# Patient Record
Sex: MALE | Race: WHITE | Employment: OTHER | URBAN - METROPOLITAN AREA
[De-identification: names, ages, dates, MRNs, and addresses within clinical notes are randomized per-mention and may not be internally consistent; named-entity substitution may affect disease eponyms.]

---

## 2017-04-19 RX ORDER — BLOOD SUGAR DIAGNOSTIC
STRIP MISCELLANEOUS
Qty: 100 STRIP | Refills: 0 | Status: SHIPPED | OUTPATIENT
Start: 2017-04-19 | End: 2017-04-19 | Stop reason: SDUPTHER

## 2017-04-19 NOTE — TELEPHONE ENCOUNTER
Patient is calling, patient states that he would like to request that the physician \"hurry up\" and send the medication to the pharmacy on file as he is out.

## 2017-04-19 NOTE — TELEPHONE ENCOUNTER
Contact # is [phone]    Patient will be out of medication in a few days.  Patient is requesting a refill on medication:  Requested Prescriptions     Pending Prescriptions Disp Refills    insulin lispro (HUMALOG) 100 unit/mL injection 10 mL 4    glucose blood VI test strips (ONETOUCH ULTRA TEST) strip 100 Strip 0     Patient is scheduled on May 9th

## 2017-04-20 RX ORDER — INSULIN LISPRO 100 [IU]/ML
INJECTION, SOLUTION INTRAVENOUS; SUBCUTANEOUS
Qty: 10 ML | Refills: 4
Start: 2017-04-20 | End: 2017-06-28 | Stop reason: ALTCHOICE

## 2017-05-09 ENCOUNTER — OFFICE VISIT (OUTPATIENT)
Dept: FAMILY MEDICINE CLINIC | Age: 77
End: 2017-05-09

## 2017-05-09 VITALS
HEART RATE: 71 BPM | SYSTOLIC BLOOD PRESSURE: 116 MMHG | DIASTOLIC BLOOD PRESSURE: 69 MMHG | TEMPERATURE: 97.6 F | RESPIRATION RATE: 18 BRPM | BODY MASS INDEX: 26.88 KG/M2 | WEIGHT: 192 LBS | OXYGEN SATURATION: 96 % | HEIGHT: 71 IN

## 2017-05-09 DIAGNOSIS — D69.6 THROMBOCYTOPENIA (HCC): ICD-10-CM

## 2017-05-09 DIAGNOSIS — N40.1 BENIGN NON-NODULAR PROSTATIC HYPERPLASIA WITH LOWER URINARY TRACT SYMPTOMS: ICD-10-CM

## 2017-05-09 DIAGNOSIS — J43.9 PULMONARY EMPHYSEMA, UNSPECIFIED EMPHYSEMA TYPE (HCC): ICD-10-CM

## 2017-05-09 NOTE — PATIENT INSTRUCTIONS
Learning About ACE Inhibitors and ARBs for Diabetes  Introduction  ACE inhibitors and ARBs are medicines used to control blood pressure. They allow blood vessels to relax and open up. This lowers your blood pressure. When you have diabetes, taking an ACE inhibitor or ARB can help to:  · Treat high blood pressure. Your risk of problems from diabetes goes up when you have high blood pressure. · Prevent or slow kidney damage. Diabetes can damage the blood vessels in the kidneys. High blood pressure can damage the kidneys, too. · Lower the risks of stroke and heart attack. Your risks go up when you have high blood pressure, heart disease, or both. An ACE inhibitor or ARB is a good choice for people with diabetes. Unlike some medicines, these don't affect blood sugar levels. Examples  ACE inhibitors include:  · Benazepril. · Lisinopril. · Ramipril. ARBs include:  · Irbesartan. · Losartan. · Telmisartan. Possible side effects  All medicines can cause side effects. Some side effects of ACE inhibitors include:  · Low blood pressure. You may feel dizzy and weak. · A cough. · High potassium levels. · An allergic reaction of the skin. Symptoms may range from mild swelling to painful welts. Some side effects of ARBs include:  · Diarrhea. · High potassium levels. · Sinus problems. · Stomach problems. You may have other side effects or reactions not listed here. Check the information that comes with your medicine. What to know about taking this medicine  · Be safe with medicines. Take your medicines exactly as prescribed. Call your doctor if you think you are having a problem with your medicine. · Before starting an ACE inhibitor or ARB, tell your doctor if you:  ¨ Use a salt substitute. ¨ Take diuretics or potassium tablets. · These medicines are not safe for pregnancy. If you are pregnant or planning to be, talk to your doctor about a safe blood pressure medicine.   · ACE inhibitors can cause a dry cough. If the cough is bad, talk to your doctor. Switching to an ARB is likely to help. · Taking some medicines together can cause problems. Tell your doctor or pharmacist all the medicines you take. This includes over-the-counter medicines, vitamins, herbal products, and supplements. · You may need regular blood and urine tests. Where can you learn more? Go to http://meg-zamzam.info/. Enter M316 in the search box to learn more about \"Learning About ACE Inhibitors and ARBs for Diabetes. \"  Current as of: June 13, 2016  Content Version: 11.2  © 5342-9530 [employer]. Care instructions adapted under license by [employer] (which disclaims liability or warranty for this information). If you have questions about a medical condition or this instruction, always ask your healthcare professional. [address] any warranty or liability for your use of this information.

## 2017-05-09 NOTE — MR AVS SNAPSHOT
Visit Information Date & Time Provider Department Dept. Phone Encounter #  
 5/9/2017  9:20 AM Gilda Martinez  [facility] [phone] [id number] Follow-up Instructions Return in about 4 weeks (around 6/6/2017) for follow up, , DM. Your Appointments 8/8/2017 10:00 AM  
ECHO CARDIOGRAMS 2D with ECHO, [facility] (APPLE SCHEDULING) Appt Note: echo at 10 am dx AS 1 yr fu appt dr Kailee Chow [address]  
[address] [address]  
  
    
 8/8/2017 10:40 AM  
ESTABLISHED PATIENT with Daniel Plunkett MD  
[facility] (APPLE SCHEDULING) Appt Note: echo at 10 am dx AS 1 yr fu appt dr Kailee Chow [address]  
[address] [facility],[address] [facility] [address] Upcoming Health Maintenance Date Due  
 FOOT EXAM Q1 7/10/1950 EYE EXAM RETINAL OR DILATED Q1 7/10/1950 DTaP/Tdap/Td series (1 - Tdap) 7/10/1962 ZOSTER VACCINE AGE 60> 7/10/2001 GLAUCOMA SCREENING Q2Y 7/10/2006 MEDICARE YEARLY EXAM 7/10/2006 HEMOGLOBIN A1C Q6M 6/13/2017 INFLUENZA AGE 9 TO ADULT 8/1/2017 Pneumococcal 65+ Low/Medium Risk (2 of 2 - PPSV23) 9/16/2017 MICROALBUMIN Q1 12/13/2017 LIPID PANEL Q1 12/13/2017 Allergies as of 5/9/2017  Review Complete On: 5/9/2017 By: Gilda Martinez MD  
 No Known Allergies Current Immunizations  Reviewed on 8/2/2016 Name Date Influenza Vaccine 7/28/2016 Not reviewed this visit You Were Diagnosed With   
  
 Codes Comments Uncontrolled type 2 diabetes mellitus without complication, with long-term current use of insulin ([facility]    -  Primary ICD-10-CM: E11.65, Z79.4 ICD-9-CM: 250.02, V58.67 Pulmonary emphysema, unspecified emphysema type ([facility] [facility]     ICD-10-CM: J43.9 ICD-9-CM: 492.8 Thrombocytopenia ([facility] [facility]     ICD-10-CM: D69.6 ICD-9-CM: 287.5 Benign non-nodular prostatic hyperplasia with lower urinary tract symptoms     ICD-10-CM: N40.1 ICD-9-CM: 600.91 Vitals BP Pulse Temp Resp Height(growth percentile) Weight(growth percentile) 116/69 (BP 1 Location: Right arm, BP Patient Position: Sitting) 71 97.6 °F (36.4 °C) (Oral) 18 5' 11\" (1.803 m) 192 lb (87.1 kg) SpO2 BMI Smoking Status 96% 26.78 kg/m2 Current Every Day Smoker Vitals History BMI and BSA Data Body Mass Index Body Surface Area  
 26.78 kg/m 2 2.09 m 2 Preferred Pharmacy Pharmacy Name Phone [address] [phone] Your Updated Medication List  
  
   
This list is accurate as of: 5/9/17 10:24 AM.  Always use your most recent med list.  
  
  
  
  
 CENTRUM SILVER ULTRA MEN'S PO Take  by mouth. furosemide 40 mg tablet Commonly known as:  LASIX TAKE 1 TABLET BY MOUTH DAILY  
  
 glucose blood VI test strips strip Commonly known as:  ONETOUCH ULTRA TEST  
TEST TWICE DAILY  
  
 insulin lispro 100 unit/mL injection Commonly known as:  HumaLOG INJECT UNDER THE SKIN BASED ON SLIDING SCALE AS DIRECTED MAXIUMUM OF 18 UNITS  
  
 insulin NPH/insulin regular 100 unit/mL (70-30) injection Commonly known as:  NOVOLIN 70/30, HUMULIN 70/30  
10 Units by SubCUTAneous route Before breakfast and dinner. Indications: type 2 diabetes mellitus Insulin Syringe-Needle U-100 1/2 mL 30 gauge Syrg USE FOR INSULIN INJECTION TWICE DAILY  
  
 ONETOUCH ULTRA2 monitoring kit Generic drug:  Blood-Glucose Meter  
  
 potassium chloride 20 mEq tablet Commonly known as:  K-DUR, KLOR-CON  
TAKE 1 TABLET BY MOUTH EVERY DAY  
  
 PROAIR HFA 90 mcg/actuation inhaler Generic drug:  albuterol  
  
 tamsulosin 0.4 mg capsule Commonly known as:  FLOMAX Take 0.4 mg by mouth daily. VESIcare 10 mg tablet Generic drug:  solifenacin TK 1 T PO QD Prescriptions Sent to Pharmacy Refills  
 insulin NPH/insulin regular (NOVOLIN 70/30, HUMULIN 70/30) 100 unit/mL (70-30) injection 1 Sig: 10 Units by SubCUTAneous route Before breakfast and dinner. Indications: type 2 diabetes mellitus Class: Normal  
 [facility] [address] [facility] Ph #: [phone] Route: SubCUTAneous We Performed the Following CBC WITH AUTOMATED DIFF [33826 CPT(R)] HEMOGLOBIN A1C WITH EAG [41745 CPT(R)]  DIABETES FOOT EXAM [HM7 Custom] LIPID PANEL [94866 CPT(R)] METABOLIC PANEL, COMPREHENSIVE [82100 CPT(R)] Follow-up Instructions Return in about 4 weeks (around 6/6/2017) for follow up, , DM. Patient Instructions Learning About ACE Inhibitors and ARBs for Diabetes Introduction ACE inhibitors and ARBs are medicines used to control blood pressure. They allow blood vessels to relax and open up. This lowers your blood pressure. When you have diabetes, taking an ACE inhibitor or ARB can help to: · Treat high blood pressure. Your risk of problems from diabetes goes up when you have high blood pressure. · Prevent or slow kidney damage. Diabetes can damage the blood vessels in the kidneys. High blood pressure can damage the kidneys, too. · Lower the risks of stroke and heart attack. Your risks go up when you have high blood pressure, heart disease, or both. An ACE inhibitor or ARB is a good choice for people with diabetes. Unlike some medicines, these don't affect blood sugar levels. Examples ACE inhibitors include: · Benazepril. · Lisinopril. · Ramipril. ARBs include: · Irbesartan. · Losartan. · Telmisartan. Possible side effects All medicines can cause side effects. Some side effects of ACE inhibitors include: · Low blood pressure. You may feel dizzy and weak. · A cough. · High potassium levels. · An allergic reaction of the skin. Symptoms may range from mild swelling to painful welts. Some side effects of ARBs include: · Diarrhea. · High potassium levels. · Sinus problems. · Stomach problems. You may have other side effects or reactions not listed here. Check the information that comes with your medicine. What to know about taking this medicine · Be safe with medicines. Take your medicines exactly as prescribed. Call your doctor if you think you are having a problem with your medicine. · Before starting an ACE inhibitor or ARB, tell your doctor if you: ¨ Use a salt substitute. ¨ Take diuretics or potassium tablets. · These medicines are not safe for pregnancy. If you are pregnant or planning to be, talk to your doctor about a safe blood pressure medicine. · ACE inhibitors can cause a dry cough. If the cough is bad, talk to your doctor. Switching to an ARB is likely to help. · Taking some medicines together can cause problems. Tell your doctor or pharmacist all the medicines you take. This includes over-the-counter medicines, vitamins, herbal products, and supplements. · You may need regular blood and urine tests. Where can you learn more? Go to http://meg-zamzam.info/. Enter M316 in the search box to learn more about \"Learning About ACE Inhibitors and ARBs for Diabetes. \" Current as of: June 13, 2016 Content Version: 11.2 © 8303-3602 [employer]. Care instructions adapted under license by [employer] (which disclaims liability or warranty for this information). If you have questions about a medical condition or this instruction, always ask your healthcare professional. [place] [address] any warranty or liability for your use of this information. Introducing [facility]! Cindy Hemp introduces Owned it patient portal. Now you can access parts of your medical record, email your doctor's office, and request medication refills online. 1. In your internet browser, go to [email or web address]. [email or web address]/[employer] 2. Click on the First Time User? Click Here link in the Sign In box. You will see the New Member Sign Up page. 3. Enter your Owned it Access Code exactly as it appears below. You will not need to use this code after youve completed the sign-up process. If you do not sign up before the expiration date, you must request a new code. · Owned it Access Code: [email or web address] Expires: 8/7/2017 10:24 AM 
 
4. Enter the last four digits of your Social Security Number (xxxx) and Date of Birth (mm/dd/yyyy) as indicated and click Submit. You will be taken to the next sign-up page. 5. Create a Owned it ID. This will be your Owned it login ID and cannot be changed, so think of one that is secure and easy to remember. 6. Create a Owned it password. You can change your password at any time. 7. Enter your Password Reset Question and Answer. This can be used at a later time if you forget your password. 8. Enter your e-mail address. You will receive e-mail notification when new information is available in [address] 9. Click Sign Up. You can now view and download portions of your medical record. 10. Click the Download Summary menu link to download a portable copy of your medical information. If you have questions, please visit the Frequently Asked Questions section of the Owned it website. Remember, Owned it is NOT to be used for urgent needs. For medical emergencies, dial 911. Now available from your iPhone and Android! Please provide this summary of care documentation to your next provider. Your primary care clinician is listed as Chacha Corral. If you have any questions after today's visit, please call [phone].

## 2017-05-09 NOTE — ACP (ADVANCE CARE PLANNING)
Patient states \" I have paper work notarized for no resuscitation \"  Patients son Joselito Deluna has paper work, he does not have copy. Requested that patient provide our office with a copy of paperwork.

## 2017-05-09 NOTE — PROGRESS NOTES
HISTORY OF PRESENT ILLNESS  Janna Landrum is a 77 y.o. male. He is a new patient and is here to establish care. Previous followed by Dr Jake Amado. The following sections were reviewed & updated as appropriate: PMH, PL, PSH, and SH. He was seen to discuss diabetes and high sugar readings and medicines  He has h/o dm, emphysema, aortic stenosis, idiopathic thmobocytopenia  HPI  Endocrine Review  He is seen for diabetes. Since last visit he reports: no polyuria or polydipsia, no chest pain, dyspnea or TIA's, no numbness, tingling or pain in extremities, no unusual visual symptoms, no hypoglycemia, no medication side effects noted. Home glucose monitoring: is performed regularly, fasting values range 180s, 2 hour post prandial values range 150-160  He reports medication compliance: compliant all of the time. Medication side effects: none. Diabetic diet compliance: compliant all of the time. Lab review: labs reviewed and discussed with patient. Eye exam: UTD. He is on Insulin Lispro as sliding scale , with meals. Lab Results   Component Value Date/Time    Hemoglobin A1c 5.4 12/13/2016 11:22 AM    Hemoglobin A1c (POC) 6.6 08/02/2016 12:31 PM        COPD  Patient complains of cough, fatigue and shortness of breath. Symptoms began several years ago. Symptoms chronic dyspnea: severity = mild: course of sx: well controlled  becomes dyspneic after 5 blocks  symptoms worse with exertion. does worsen with exertion. Sputum is clear in small amounts. Patient currently is not on home oxygen therapy. Lorenso Frankel Respiratory history: pneumonia, COPD and history of 10  pack years tobacco use  Follows Dr Isaac Pike. PFT not c/w obstructive disease. He was advised to continue on Dulera and prn Albuterol. in spite of counseling, he is still smoking  He stopped all inhalers      He has thrombocytopenia for years and follows Dr Ivania Brady. He is s/p extensive work up including bone marrow Bx and he is diagnosed with idiopathic TP. LDH, reticulocyte count, all normal.  Review of Systems   Constitutional: Negative for chills, fever and malaise/fatigue. HENT: Negative for congestion, ear pain, sore throat and tinnitus. Eyes: Negative for blurred vision, double vision, pain and discharge. Respiratory: Positive for shortness of breath. Negative for cough and wheezing. Cardiovascular: Negative for chest pain, palpitations and leg swelling. Gastrointestinal: Negative for abdominal pain, blood in stool, constipation, diarrhea, nausea and vomiting. Genitourinary: Negative for dysuria, frequency, hematuria and urgency. Musculoskeletal: Negative for back pain, joint pain and myalgias. Skin: Negative for rash. Neurological: Negative for dizziness, tremors, seizures and headaches. Endo/Heme/Allergies: Negative for polydipsia. Does not bruise/bleed easily. Psychiatric/Behavioral: Negative for depression and substance abuse. The patient is not nervous/anxious. Physical Exam   Constitutional: He is oriented to person, place, and time. He appears well-developed and well-nourished. HENT:   Head: Normocephalic and atraumatic. Right Ear: External ear normal.   Mouth/Throat: Oropharynx is clear and moist. No oropharyngeal exudate. Eyes: Conjunctivae and EOM are normal. Pupils are equal, round, and reactive to light. No scleral icterus. Neck: Normal range of motion. Neck supple. No JVD present. No thyromegaly present. Cardiovascular: Normal rate, regular rhythm, normal heart sounds and intact distal pulses. No murmur heard. Pulmonary/Chest: Effort normal and breath sounds normal. He has no wheezes. Abdominal: Soft. Bowel sounds are normal. He exhibits no distension and no mass. Musculoskeletal: Normal range of motion. He exhibits no edema or tenderness.    Feet:warm, good capillary refill, no trophic changes or ulcerative lesions, normal DP and PT pulses, normal monofilament exam and normal sensory exam Lymphadenopathy:     He has no cervical adenopathy. Neurological: He is alert and oriented to person, place, and time. He has normal reflexes. No cranial nerve deficit. Skin: Skin is warm and dry. No rash noted. He is not diaphoretic. Psychiatric: He has a normal mood and affect. Nursing note and vitals reviewed. ASSESSMENT and PLAN  Angela Denson was seen today for diabetes and hip pain. Diagnoses and all orders for this visit:    Uncontrolled type 2 diabetes mellitus without complication, with long-term current use of insulin ([facility])  -     insulin NPH/insulin regular (NOVOLIN 70/30, HUMULIN 70/30) 100 unit/mL (70-30) injection; 10 Units by SubCUTAneous route Before breakfast and dinner. Indications: type 2 diabetes mellitus  -     HM DIABETES FOOT EXAM  -     METABOLIC PANEL, COMPREHENSIVE  -     LIPID PANEL  -     HEMOGLOBIN A1C WITH EAG    Pulmonary emphysema, unspecified emphysema type ([facility])  -     METABOLIC PANEL, COMPREHENSIVE    Thrombocytopenia ([facility])  -     CBC WITH AUTOMATED DIFF    Benign non-nodular prostatic hyperplasia with lower urinary tract symptoms    changed Insulin Lispro to 70/30 for better sugar coverage. Discussed lifestyle issues and health guidance given  Patient was given an after visit summary which includes diagnoses, vital signs, current medications, instructions and references & authorized prescriptions . Results of labs will be conveyed to patient, once available. Pt verbalized instructions I provided and expressed understanding of discussion that was held today. Follow-up Disposition:  Return in about 4 weeks (around 6/6/2017) for follow up, , DM.

## 2017-05-09 NOTE — PROGRESS NOTES
Chief Complaint   Patient presents with    Diabetes     Follow up, fasting. Patient concerned about getting his diabetes under control . Patient states \" I add 4 units to the required regimen. \"      Hip Pain     right side      1. Have you been to the ER, urgent care clinic since your last visit? Hospitalized since your last visit? No    2. Have you seen or consulted any other health care providers outside of the [address] [place] [address] since your last visit? Include any pap smears or colon screening. No    Patient states \" I have paper work notarized for no resuscitation \"  Patients son Nate Baptiste has paper work, he does not have copy. Requested that patient provide our office with a copy of paperwork.

## 2017-05-10 LAB
ALBUMIN SERPL-MCNC: 4.6 G/DL (ref 3.5–4.8)
ALBUMIN/GLOB SERPL: 1.8 {RATIO} (ref 1.2–2.2)
ALP SERPL-CCNC: 74 IU/L (ref 39–117)
ALT SERPL-CCNC: 11 IU/L (ref 0–44)
AST SERPL-CCNC: 20 IU/L (ref 0–40)
BASOPHILS # BLD AUTO: 0 X10E3/UL (ref 0–0.2)
BASOPHILS NFR BLD AUTO: 1 %
BILIRUB SERPL-MCNC: 0.7 MG/DL (ref 0–1.2)
BUN SERPL-MCNC: 18 MG/DL (ref 8–27)
BUN/CREAT SERPL: 20 (ref 10–24)
CALCIUM SERPL-MCNC: 9.7 MG/DL (ref 8.6–10.2)
CHLORIDE SERPL-SCNC: 101 MMOL/L (ref 96–106)
CHOLEST SERPL-MCNC: 147 MG/DL (ref 100–199)
CO2 SERPL-SCNC: 23 MMOL/L (ref 18–29)
CREAT SERPL-MCNC: 0.89 MG/DL (ref 0.76–1.27)
EOSINOPHIL # BLD AUTO: 0.3 X10E3/UL (ref 0–0.4)
EOSINOPHIL NFR BLD AUTO: 5 %
ERYTHROCYTE [DISTWIDTH] IN BLOOD BY AUTOMATED COUNT: 16.2 % (ref 12.3–15.4)
EST. AVERAGE GLUCOSE BLD GHB EST-MCNC: 117 MG/DL
GLOBULIN SER CALC-MCNC: 2.5 G/DL (ref 1.5–4.5)
GLUCOSE SERPL-MCNC: 114 MG/DL (ref 65–99)
HBA1C MFR BLD: 5.7 % (ref 4.8–5.6)
HCT VFR BLD AUTO: 40.5 % (ref 37.5–51)
HDLC SERPL-MCNC: 42 MG/DL
HGB BLD-MCNC: 13.9 G/DL (ref 12.6–17.7)
IMM GRANULOCYTES # BLD: 0.1 X10E3/UL (ref 0–0.1)
IMM GRANULOCYTES NFR BLD: 1 %
INTERPRETATION, 910389: NORMAL
LDLC SERPL CALC-MCNC: 89 MG/DL (ref 0–99)
LYMPHOCYTES # BLD AUTO: 1.3 X10E3/UL (ref 0.7–3.1)
LYMPHOCYTES NFR BLD AUTO: 24 %
MCH RBC QN AUTO: 31.2 PG (ref 26.6–33)
MCHC RBC AUTO-ENTMCNC: 34.3 G/DL (ref 31.5–35.7)
MCV RBC AUTO: 91 FL (ref 79–97)
MONOCYTES # BLD AUTO: 0.5 X10E3/UL (ref 0.1–0.9)
MONOCYTES NFR BLD AUTO: 9 %
MORPHOLOGY BLD-IMP: ABNORMAL
NEUTROPHILS # BLD AUTO: 3.4 X10E3/UL (ref 1.4–7)
NEUTROPHILS NFR BLD AUTO: 60 %
PLATELET # BLD AUTO: 56 X10E3/UL (ref 150–379)
POTASSIUM SERPL-SCNC: 5.1 MMOL/L (ref 3.5–5.2)
PROT SERPL-MCNC: 7.1 G/DL (ref 6–8.5)
RBC # BLD AUTO: 4.45 X10E6/UL (ref 4.14–5.8)
SODIUM SERPL-SCNC: 140 MMOL/L (ref 134–144)
TRIGL SERPL-MCNC: 78 MG/DL (ref 0–149)
VLDLC SERPL CALC-MCNC: 16 MG/DL (ref 5–40)
WBC # BLD AUTO: 5.6 X10E3/UL (ref 3.4–10.8)

## 2017-05-10 NOTE — PROGRESS NOTES
Please inform patient  CBC shows low platelets, is following hematology, to keep appointment and follow up with Dr Charles Cruz  Excellent cholesterol results  Kidney and liver function normal, average blood sugar 5.7, very normal for his age.  To continue on new regimen I discussed yesterday

## 2017-05-17 DIAGNOSIS — R60.9 EDEMA, UNSPECIFIED TYPE: ICD-10-CM

## 2017-05-17 DIAGNOSIS — I35.0 NONRHEUMATIC AORTIC VALVE STENOSIS: ICD-10-CM

## 2017-05-17 RX ORDER — FUROSEMIDE 40 MG/1
TABLET ORAL
Qty: 30 TAB | Refills: 0 | Status: SHIPPED | COMMUNITY
Start: 2017-05-17 | End: 2017-06-15 | Stop reason: SDUPTHER

## 2017-05-22 RX ORDER — POTASSIUM CHLORIDE 20 MEQ/1
TABLET, EXTENDED RELEASE ORAL
Qty: 90 TAB | Refills: 0 | Status: SHIPPED | OUTPATIENT
Start: 2017-05-22 | End: 2017-08-28 | Stop reason: SDUPTHER

## 2017-05-22 NOTE — TELEPHONE ENCOUNTER
Received faxed refill request for this medication from the pharmacy that is on file.     potassium chloride (K-DUR, KLOR-CON) 20 mEq tablet [Pharmacy Med Name: POTASSIUM CL 20MEQ ER TABLETS]  TAKE 1 TABLET BY MOUTH EVERY DAY, Normal, Disp-30 Tab, R-0

## 2017-06-15 DIAGNOSIS — I35.0 NONRHEUMATIC AORTIC VALVE STENOSIS: ICD-10-CM

## 2017-06-15 DIAGNOSIS — R60.9 EDEMA, UNSPECIFIED TYPE: ICD-10-CM

## 2017-06-15 RX ORDER — FUROSEMIDE 40 MG/1
TABLET ORAL
Qty: 30 TAB | Refills: 0 | Status: SHIPPED | COMMUNITY
Start: 2017-06-15 | End: 2017-07-23 | Stop reason: SDUPTHER

## 2017-06-28 ENCOUNTER — OFFICE VISIT (OUTPATIENT)
Dept: FAMILY MEDICINE CLINIC | Age: 77
End: 2017-06-28

## 2017-06-28 VITALS
OXYGEN SATURATION: 95 % | WEIGHT: 194 LBS | TEMPERATURE: 97.7 F | RESPIRATION RATE: 16 BRPM | HEIGHT: 71 IN | HEART RATE: 56 BPM | BODY MASS INDEX: 27.16 KG/M2 | SYSTOLIC BLOOD PRESSURE: 110 MMHG | DIASTOLIC BLOOD PRESSURE: 67 MMHG

## 2017-06-28 DIAGNOSIS — E11.43 CONTROLLED TYPE 2 DIABETES MELLITUS WITH DIABETIC AUTONOMIC NEUROPATHY, WITH LONG-TERM CURRENT USE OF INSULIN (HCC): Primary | ICD-10-CM

## 2017-06-28 DIAGNOSIS — M16.11 ARTHRITIS OF RIGHT HIP: ICD-10-CM

## 2017-06-28 DIAGNOSIS — Z79.4 CONTROLLED TYPE 2 DIABETES MELLITUS WITH DIABETIC AUTONOMIC NEUROPATHY, WITH LONG-TERM CURRENT USE OF INSULIN (HCC): Primary | ICD-10-CM

## 2017-06-28 LAB — HBA1C MFR BLD HPLC: 5.4 %

## 2017-06-28 RX ORDER — MELOXICAM 15 MG/1
15 TABLET ORAL
Qty: 30 TAB | Refills: 2 | Status: SHIPPED | OUTPATIENT
Start: 2017-06-28 | End: 2017-09-26 | Stop reason: SDUPTHER

## 2017-06-28 RX ORDER — GABAPENTIN 100 MG/1
100 CAPSULE ORAL
Qty: 90 CAP | Refills: 1 | Status: SHIPPED | OUTPATIENT
Start: 2017-06-28 | End: 2017-12-26 | Stop reason: SDUPTHER

## 2017-06-28 NOTE — MR AVS SNAPSHOT
Visit Information Date & Time Provider Department Dept. Phone Encounter #  
 6/28/2017 10:40 AM Sanjuana Brown MD [address] [place] [address] [phone] [id number] Follow-up Instructions Return in about 3 months (around 9/28/2017) for fasting, follow up, DM, CHO. Upcoming Health Maintenance Date Due  
 EYE EXAM RETINAL OR DILATED Q1 7[birth year]10/1950 DTaP/Tdap/Td series (1 - Tdap) 7/10/1962 ZOSTER VACCINE AGE 60> 7/10/2001 GLAUCOMA SCREENING Q2Y 7/10/2006 MEDICARE YEARLY EXAM 7/10/2006 INFLUENZA AGE 9 TO ADULT 8/1/2017 Pneumococcal 65+ Low/Medium Risk (2 of 2 - PPSV23) 9/16/2017 HEMOGLOBIN A1C Q6M 11/9/2017 MICROALBUMIN Q1 12/13/2017 FOOT EXAM Q1 5/9/2018 LIPID PANEL Q1 5/9/2018 Allergies as of 6/28/2017  Review Complete On: 6/28/2017 By: Sanjuana Brown MD  
 No Known Allergies Current Immunizations  Reviewed on 5/9/2017 Name Date Influenza High Dose Vaccine PF 9/16/2016, 11/7/2015 Influenza Vaccine 7/28/2016, 1/1/2016, 1/1/2015, 10/14/2013 Pneumococcal Conjugate (PCV-13) 9/16/2016, 6/10/2016, 1/1/2016 Not reviewed this visit You Were Diagnosed With   
  
 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 
ICD-9-CM: 716.95 Vitals BP Pulse Temp Resp Height(growth percentile) Weight(growth percentile) 110/67 (BP 1 Location: Right arm, BP Patient Position: Sitting) (!) 56 97.7 °F (36.5 °C) (Oral) 16 5' 11\" (1.803 m) 194 lb (88 kg) SpO2 BMI Smoking Status 95% 27.06 kg/m2 Current Every Day Smoker Vitals History BMI and BSA Data Body Mass Index Body Surface Area  
 27.06 kg/m 2 2.1 m 2 Preferred Pharmacy Pharmacy Name Phone [address]  [phone] Your Updated Medication List  
  
   
This list is accurate as of: 6/28/17 11:35 AM.  Always use your most recent med list.  
  
  
  
  
 CENTRUM SILVER ULTRA MEN'S PO Take  by mouth. furosemide 40 mg tablet Commonly known as:  LASIX TAKE 1 TABLET BY MOUTH DAILY  
  
 gabapentin 100 mg capsule Commonly known as:  NEURONTIN Take 1 Cap by mouth nightly. glucose blood VI test strips strip Commonly known as:  ONETOUCH ULTRA TEST  
TEST TWICE DAILY  
  
 insulin NPH/insulin regular 100 unit/mL (70-30) injection Commonly known as:  NOVOLIN 70/30, HUMULIN 70/30  
10 Units by SubCUTAneous route Before breakfast and dinner. Indications: type 2 diabetes mellitus Insulin Syringe-Needle U-100 1/2 mL 30 gauge Syrg USE FOR INSULIN INJECTION TWICE DAILY  
  
 meloxicam 15 mg tablet Commonly known as:  MOBIC Take 1 Tab by mouth daily (after breakfast). ONETOUCH ULTRA2 monitoring kit Generic drug:  Blood-Glucose Meter  
  
 potassium chloride 20 mEq tablet Commonly known as:  K-DUR, KLOR-CON  
TAKE 1 TABLET BY MOUTH EVERY DAY  
  
 PROAIR HFA 90 mcg/actuation inhaler Generic drug:  albuterol  
  
 tamsulosin 0.4 mg capsule Commonly known as:  FLOMAX Take 0.4 mg by mouth daily. VESIcare 10 mg tablet Generic drug:  solifenacin TK 1 T PO QD Prescriptions Sent to Pharmacy Refills  
 gabapentin (NEURONTIN) 100 mg capsule 1 Sig: Take 1 Cap by mouth nightly. Class: Normal  
 [facility] #: [phone] Route: Oral  
 meloxicam (MOBIC) 15 mg tablet 2 Sig: Take 1 Tab by mouth daily (after breakfast).   
 Class: Normal  
 Pharmacy: [facility] [address] [place] [address] #: [phone] Route: Oral  
  
We Performed the Following AMB POC HEMOGLOBIN A1C [87164 CPT(R)] REFERRAL TO OPHTHALMOLOGY [REF57 Custom] Comments:  
 Please evaluate patient for diabetic retinopathy. Follow-up Instructions Return in about 3 months (around 9/28/2017) for fasting, follow up, DM, CHO. Referral Information Referral ID Referred By Referred To  
  
 [id number] Sander Doom [facility] [address] [place], [address] Visits Status Start Date End Date 1 New Request 6/28/17 6/28/18 If your referral has a status of pending review or denied, additional information will be sent to support the outcome of this decision. Introducing [facility]! Gilma Jacobson introduces [email or web address] patient portal. Now you can access parts of your medical record, email your doctor's office, and request medication refills online. 1. In your internet browser, go to [email or web address] 2. Click on the First Time User? Click Here link in the Sign In box. You will see the New Member Sign Up page. 3. Enter your [email or web address] Access Code exactly as it appears below. You will not need to use this code after youve completed the sign-up process. If you do not sign up before the expiration date, you must request a new code. · [email or web address] Access Code: [email or web address] Expires: 8/7/2017 10:24 AM 
 
4. Enter the last four digits of your Social Security Number (xxxx) and Date of Birth (mm/dd/yyyy) as indicated and click Submit. You will be taken to the next sign-up page. 5. Create a Angstrot ID. This will be your [email or web address] login ID and cannot be changed, so think of one that is secure and easy to remember. 6. Create a [email or web address] password. You can change your password at any time. 7. Enter your Password Reset Question and Answer.  This can be used at a later time if you forget your password. 8. Enter your e-mail address. You will receive e-mail notification when new information is available in [address] 9. Click Sign Up. You can now view and download portions of your medical record. 10. Click the Download Summary menu link to download a portable copy of your medical information. If you have questions, please visit the Frequently Asked Questions section of the [employer] website. Remember, [employer] is NOT to be used for urgent needs. For medical emergencies, dial 911. Now available from your iPhone and Android! Please provide this summary of care documentation to your next provider. Your primary care clinician is listed as Andre Ogden. If you have any questions after today's visit, please call [phone].

## 2017-06-28 NOTE — PATIENT INSTRUCTIONS

## 2017-06-28 NOTE — PROGRESS NOTES
Chief Complaint   Patient presents with    Diabetes     Follow up    Hip Pain     right    Foot Swelling     No swelling, Patient states \" Feels like a have a pad on the bottom of my feet\"       1. Have you been to the ER, urgent care clinic since your last visit? Hospitalized since your last visit? No    2. Have you seen or consulted any other health care providers outside of the [address] [place] [address] since your last visit? Include any pap smears or colon screening.  No

## 2017-06-28 NOTE — PROGRESS NOTES
HISTORY OF PRESENT ILLNESS  Trever Clark is a 77 y.o. male. He was seen for 1 month follow up on diabetes and right hip pain and numbness left foot. HPI  Endocrine Review  He is seen for diabetes. Since last visit he reports: no polyuria or polydipsia, no chest pain, dyspnea or TIA's, no unusual visual symptoms, no hypoglycemia, no medication side effects noted, has noticed numbness both feet. Home glucose monitoring: is performed regularly, fasting values range 140-200  He is checking his sugars 2 per day. He reports medication compliance: compliant all of the time. Medication side effects: none. Diabetic diet compliance: compliant all of the time. Lab review: labs reviewed and discussed with patient. Eye exam: overdue. Referral done  His sliding scale Insulin was changed to NPH 12 units bid on last visit    Lab Results   Component Value Date/Time    Hemoglobin A1c 5.7 05/09/2017 10:28 AM    Glucose 114 05/09/2017 10:28 AM    Creatinine 0.89 05/09/2017 10:28 AM    Cholesterol, total 147 05/09/2017 10:28 AM    HDL Cholesterol 42 05/09/2017 10:28 AM    LDL, calculated 89 05/09/2017 10:28 AM    Triglyceride 78 05/09/2017 10:28 AM     Hip Pain  Patient complains of right hip pain. Onset of the symptoms was several months ago. Inciting event: none. Current symptoms include right sided hip pain. Severity = moderate  location: lateral, over greater trochanter, anterior  radiates to: right inguinal region, right sided  upper leg(s): anterior, lateral  worse with weight bearing. Associated symptoms: none. Aggravating symptoms: rising after sitting, any weight bearing. Patient's overall course: gradually worsening and course of pain: gradually worsening. Patient has had no prior hip problems. Previous visits for this problem: none. Evaluation to date: none. Treatment to date: none. Review of Systems   Constitutional: Negative for chills, fever and malaise/fatigue.    HENT: Negative for congestion, ear pain, sore throat and tinnitus. Eyes: Negative for blurred vision, double vision, pain and discharge. Respiratory: Negative for cough, shortness of breath and wheezing. Cardiovascular: Negative for chest pain, palpitations and leg swelling. Gastrointestinal: Negative for abdominal pain, blood in stool, constipation, diarrhea, nausea and vomiting. Genitourinary: Negative for dysuria, frequency, hematuria and urgency. Musculoskeletal: Negative for back pain, joint pain and myalgias. Hip pain right   Skin: Negative for rash. Neurological: Negative for dizziness, tremors, seizures and headaches. Endo/Heme/Allergies: Negative for polydipsia. Does not bruise/bleed easily. Psychiatric/Behavioral: Negative for depression and substance abuse. The patient is not nervous/anxious. Physical Exam   Constitutional: He is oriented to person, place, and time. He appears well-developed and well-nourished. HENT:   Head: Normocephalic and atraumatic. Right Ear: External ear normal.   Mouth/Throat: Oropharynx is clear and moist. No oropharyngeal exudate. Eyes: Conjunctivae and EOM are normal. Pupils are equal, round, and reactive to light. No scleral icterus. Neck: Normal range of motion. Neck supple. No JVD present. No thyromegaly present. Cardiovascular: Normal rate, regular rhythm, normal heart sounds and intact distal pulses. No murmur heard. Pulmonary/Chest: Effort normal and breath sounds normal. He has no wheezes. Abdominal: Soft. Bowel sounds are normal. He exhibits no distension and no mass. Musculoskeletal: Normal range of motion. He exhibits no edema or tenderness. Feet:warm, good capillary refill, no trophic changes or ulcerative lesions, normal DP and PT pulses and reduced sensation at both feet    right Hip   Tenderness: Greater trochanter, Lateral and Anterior    Positive ADAMS and FADIR     Lymphadenopathy:     He has no cervical adenopathy.    Neurological: He is alert and oriented to person, place, and time. He has normal reflexes. No cranial nerve deficit. Skin: Skin is warm and dry. No rash noted. He is not diaphoretic. Psychiatric: He has a normal mood and affect. Nursing note and vitals reviewed. ASSESSMENT and PLAN  Laxmi Shelley was seen today for diabetes, hip pain and foot swelling. Diagnoses and all orders for this visit:    Controlled type 2 diabetes mellitus with diabetic autonomic neuropathy, with long-term current use of insulin (HCC)  -     AMB POC HEMOGLOBIN A1C  -     REFERRAL TO OPHTHALMOLOGY  -     gabapentin (NEURONTIN) 100 mg capsule; Take 1 Cap by mouth nightly. Arthritis of right hip  -     meloxicam (MOBIC) 15 mg tablet; Take 1 Tab by mouth daily (after breakfast). Discussed lifestyle issues and health guidance given  Patient was given an after visit summary which includes diagnoses, vital signs, current medications, instructions and references & authorized prescriptions . Results of labs will be conveyed to patient, once available. Pt verbalized instructions I provided and expressed understanding of discussion that was held today. Follow-up Disposition:  Return in about 3 months (around 9/28/2017) for fasting, follow up, DM, CHO.

## 2017-06-29 ENCOUNTER — OFFICE VISIT (OUTPATIENT)
Dept: FAMILY MEDICINE CLINIC | Age: 77
End: 2017-06-29

## 2017-06-29 ENCOUNTER — PATIENT OUTREACH (OUTPATIENT)
Dept: FAMILY MEDICINE CLINIC | Age: 77
End: 2017-06-29

## 2017-06-29 VITALS
BODY MASS INDEX: 27.16 KG/M2 | SYSTOLIC BLOOD PRESSURE: 112 MMHG | WEIGHT: 194 LBS | TEMPERATURE: 98.4 F | HEIGHT: 71 IN | RESPIRATION RATE: 16 BRPM | DIASTOLIC BLOOD PRESSURE: 70 MMHG | HEART RATE: 66 BPM

## 2017-06-29 DIAGNOSIS — Z00.00 ROUTINE GENERAL MEDICAL EXAMINATION AT A HEALTH CARE FACILITY: Primary | ICD-10-CM

## 2017-06-29 NOTE — PROGRESS NOTES
Nurse Navigator did a medicare wellness visit. Their note was reviewed & discussed with the patient. Orders placed.     Gilda Martinez MD  [facility],   Phone: [phone]  Fax [phone]

## 2017-06-29 NOTE — ACP (ADVANCE CARE PLANNING)
Declined at this time. Explained it to the patient, said his son is his POA and knows what he would want. Advised will send information to him to read and if he would like to discuss with me, can call me to schedule an appointment.

## 2017-06-29 NOTE — PATIENT INSTRUCTIONS

## 2017-06-29 NOTE — PROGRESS NOTES
Azeb Feldman is a 77 y.o. male and presents for annual Medicare Wellness Visit. Problem List: Reviewed with patient and discussed risk factors. Patient Active Problem List   Diagnosis Code    Edema R60.9    Controlled type 2 diabetes mellitus with diabetic autonomic neuropathy (HCC) E11.43    Pulmonary emphysema (HCC) J43.9    Benign prostatic hyperplasia N40.0    Thrombocytopenia (HCC) D69.6    Tobacco abuse Z72.0    Nonrheumatic aortic valve stenosis I35.0    Arthritis of right hip M16.11       Current medical providers:  Patient Care Team:  Cinthya Ware MD as PCP - General (Family Practice)  Odalys Locke MD (Cardiology)  Marialuisa Hughes MD (Urology)    PSH: Reviewed with patient  Past Surgical History:   Procedure Laterality Date    ABDOMEN SURGERY PROC UNLISTED      hernia repair    HX GI      cholecystectomy    HX ORTHOPAEDIC      screws in left ankle    HX RETINAL DETACHMENT REPAIR          SH: Reviewed with patient  Social History   Substance Use Topics    Smoking status: Current Every Day Smoker     Packs/day: 0.50    Smokeless tobacco: Never Used    Alcohol use 8.4 oz/week     14 Cans of beer, 0 Standard drinks or equivalent per week      Comment: Social        FH: Reviewed with patient  Family History   Problem Relation Age of Onset    Diabetes Mother     Diabetes Father      had angina    Diabetes Paternal Grandmother        Medications/Allergies: Reviewed with patient  Current Outpatient Prescriptions on File Prior to Visit   Medication Sig Dispense Refill    gabapentin (NEURONTIN) 100 mg capsule Take 1 Cap by mouth nightly. 90 Cap 1    meloxicam (MOBIC) 15 mg tablet Take 1 Tab by mouth daily (after breakfast).  30 Tab 2    furosemide (LASIX) 40 mg tablet TAKE 1 TABLET BY MOUTH DAILY 30 Tab 0    potassium chloride (K-DUR, KLOR-CON) 20 mEq tablet TAKE 1 TABLET BY MOUTH EVERY DAY 90 Tab 0    insulin NPH/insulin regular (NOVOLIN 70/30, HUMULIN 70/30) 100 unit/mL (70-30) injection 10 Units by SubCUTAneous route Before breakfast and dinner. Indications: type 2 diabetes mellitus 10 mL 1    glucose blood VI test strips (ONETOUCH ULTRA TEST) strip TEST TWICE DAILY 100 Strip 0    PROAIR HFA 90 mcg/actuation inhaler       VESICARE 10 mg tablet TK 1 T PO QD  0    Insulin Syringe-Needle U-100 1/2 mL 30 syrg USE FOR INSULIN INJECTION TWICE DAILY 60 Syringe 11    MULTIVIT-MIN/FA/LYCOPEN/LUTEIN (CENTRUM SILVER ULTRA MEN'S PO) Take  by mouth.  ONETOUCH ULTRA2 monitoring kit   0    tamsulosin (FLOMAX) 0.4 mg capsule Take 0.4 mg by mouth daily. No current facility-administered medications on file prior to visit. No Known Allergies    Objective:  Visit Vitals    Ht 5' 11\" (1.803 m)    Wt 194 lb (88 kg)    BMI 27.06 kg/m2    Body mass index is 27.06 kg/(m^2). .pulse                               66   blood pressure                 112/64  Assessment of cognitive impairment: Alert and oriented x 3    Depression Screen:   PHQ over the last two weeks 6/29/2017   Little interest or pleasure in doing things Not at all   Feeling down, depressed or hopeless Not at all   Total Score PHQ 2 0       Fall Risk Assessment:    Fall Risk Assessment, last 12 mths 6/29/2017   Able to walk? Yes   Fall in past 12 months? Yes   Fall with injury? No   Number of falls in past 12 months 3   Fall Risk Score 3       Functional Ability:   Does the patient exhibit a steady gait? yes   How long did it take the patient to get up and walk from a sitting position? 6 seconds   Is the patient self reliant?  (ie can do own laundry, meals, household chores)  Yes, wife is invalid     Does the patient handle his/her own medications? yes     Does the patient handle his/her own money? yes   Is the patients home safe (ie good lighting, handrails on stairs and bath, etc.)? yes     Did you notice or did patient express any hearing difficulties?    no     Did you notice or did patient express any vision difficulties? No, but needs annual eye exam     Were distance and reading eye charts used? no       Advance Care Planning:   Patient was offered the opportunity to discuss advance care planning:  yes   Does patient have an Advance Directive:  no   If no, did you provide information on Caring Connections? yes       Plan:      No orders of the defined types were placed in this encounter. Health Maintenance   Topic Date Due    EYE EXAM RETINAL OR DILATED Q1  07/10/1950    DTaP/Tdap/Td series (1 - Tdap) 07/10/1962    ZOSTER VACCINE AGE 60>  07/10/2001    GLAUCOMA SCREENING Q2Y  07/10/2006    MEDICARE YEARLY EXAM  07/10/2006    INFLUENZA AGE 9 TO ADULT  08/01/2017    Pneumococcal 65+ Low/Medium Risk (2 of 2 - PPSV23) 09/16/2017    MICROALBUMIN Q1  12/13/2017    HEMOGLOBIN A1C Q6M  12/28/2017    FOOT EXAM Q1  05/09/2018    LIPID PANEL Q1  05/09/2018   Encouraged patient to call and schedule his yearly vision exam with . States he is a little unsteady when walks, urged to use his cane at all times. Also reports, unsteady when it gets dark outside-urged to use adequate lighting and cane as well, discussed precautions. Again, needs vision exam to be done. Reviewed medications- had some concerns about new rx Mobic- read about potential side effects. Explained that this is potential only-suggest he try the medication, since his pcp had recommended it for him. Advised if has any unusual side effects, to call and notify his doctor. He was agreeable to the plan. Cautioned him strongly to make sure he has eaten before taking, never to take on an empty stomach. He said he understood. *Patient verbalized understanding and agreement with the plan. A copy of the After Visit Summary with personalized health plan was given to the patient today. Schedule of Personalized Health Plan  (Provide Copy to Patient)  The best way to stay healthy is to live a healthy lifestyle.  A healthy lifestyle includes regular exercise, eating a well-balanced diet, keeping a healthy weight and not smoking. Regular physical exams and screening tests are another important way to take care of yourself. Preventive exams provided by health care providers can find health problems early when treatment works best and can keep you from getting certain diseases or illnesses. Preventive services include exams, lab tests, screenings, shots, monitoring and information to help you take care of your own health. All people over 65 should have a pneumonia shot. Pneumonia shots are usually only needed once in a lifetime unless your doctor decides differently. All people over 65 should have a yearly flu shot. People over 65 are at medium to high risk for Hepatitis B. Three shots are needed for complete protection. In addition to your physical exam, some screening tests are recommended:    Bone mass measurement (dexa scan) is recommended every two years if you have certain risk factors, such as personal history of vertebral fracture or chronic steroid medication use    Diabetes Mellitus screening is recommended every year. Glaucoma is an eye disease caused by high pressure in the eye. An eye exam is recommended every year. Cardiovascular screening tests that check your cholesterol and other blood fat (lipid) levels are recommended every five years. Colorectal Cancer screening tests help to find pre-cancerous polyps (growths in the colon) so they can be removed before they turn into cancer. Tests ordered for screening depend on your personal and family history risk factors.     Screening for Prostate Cancer is recommended yearly with a digital rectal exam and/or a PSA test    Here is a list of your current Health Maintenance items with a due date:  Health Maintenance   Topic Date Due    EYE EXAM RETINAL OR DILATED Q1  07/10/1950    DTaP/Tdap/Td series (1 - Tdap) 07/10/1962    ZOSTER VACCINE AGE 60> 07/10/2001    GLAUCOMA SCREENING Q2Y  07/10/2006    INFLUENZA AGE 9 TO ADULT  08/01/2017    Pneumococcal 65+ Low/Medium Risk (2 of 2 - PPSV23) 09/16/2017    MICROALBUMIN Q1  12/13/2017    HEMOGLOBIN A1C Q6M  12/28/2017    FOOT EXAM Q1  05/09/2018    LIPID PANEL Q1  05/09/2018    MEDICARE YEARLY EXAM  06/30/2018

## 2017-07-21 DIAGNOSIS — I35.0 NONRHEUMATIC AORTIC VALVE STENOSIS: ICD-10-CM

## 2017-07-21 DIAGNOSIS — R60.9 EDEMA, UNSPECIFIED TYPE: ICD-10-CM

## 2017-07-23 RX ORDER — FUROSEMIDE 40 MG/1
TABLET ORAL
Qty: 30 TAB | Refills: 0 | Status: SHIPPED | COMMUNITY
Start: 2017-07-23 | End: 2017-08-26 | Stop reason: SDUPTHER

## 2017-07-28 NOTE — TELEPHONE ENCOUNTER
Pharmacy on file verified   *patient is completely out of medication; patient is in pharmacy requesting that another physician sign off on the medication requested.

## 2017-08-17 NOTE — TELEPHONE ENCOUNTER
Last office visit on 06/28/17 by you and had MWE. To return in 3 months f/u. Has apt. On 10/03/17. Rx last written on 04/20/17 for 100 strips and 0 rf.

## 2017-08-25 DIAGNOSIS — I35.0 NONRHEUMATIC AORTIC VALVE STENOSIS: ICD-10-CM

## 2017-08-25 DIAGNOSIS — R60.9 EDEMA, UNSPECIFIED TYPE: ICD-10-CM

## 2017-08-26 RX ORDER — FUROSEMIDE 40 MG/1
TABLET ORAL
Qty: 30 TAB | Refills: 0 | Status: SHIPPED | COMMUNITY
Start: 2017-08-26 | End: 2017-09-26 | Stop reason: SDUPTHER

## 2017-08-28 NOTE — TELEPHONE ENCOUNTER
Refill request:   Requested Prescriptions     Pending Prescriptions Disp Refills    potassium chloride (K-DUR, KLOR-CON) 20 mEq tablet 90 Tab 0    tamsulosin (FLOMAX) 0.4 mg capsule       Sig: Take 1 Cap by mouth daily.

## 2017-08-29 RX ORDER — TAMSULOSIN HYDROCHLORIDE 0.4 MG/1
0.4 CAPSULE ORAL DAILY
Qty: 90 CAP | Refills: 1 | Status: SHIPPED | OUTPATIENT
Start: 2017-08-29 | End: 2018-03-01 | Stop reason: SDUPTHER

## 2017-08-29 RX ORDER — POTASSIUM CHLORIDE 20 MEQ/1
TABLET, EXTENDED RELEASE ORAL
Qty: 90 TAB | Refills: 0 | Status: SHIPPED | OUTPATIENT
Start: 2017-08-29 | End: 2017-11-24 | Stop reason: SDUPTHER

## 2017-09-21 NOTE — TELEPHONE ENCOUNTER
----- Message from Eugeniamissy Wayne sent at 9/21/2017 10:22 AM EDT -----  Regarding: Dr. Anila Rivas [phone]  Pt stated, he has only 1 insulin needle left \"testing 2 times a day\". Pt advised, Elsie [phone] sent a request over the wkend and provided pt with 10 needles to hold him. Pt is requesting 60 Quantity \"Super Thin 2, 30 Gauge needles\" to be called in today.

## 2017-09-22 RX ORDER — NAPROXEN SODIUM 220 MG
TABLET ORAL
Qty: 60 SYRINGE | Refills: 11 | Status: SHIPPED | OUTPATIENT
Start: 2017-09-22 | End: 2018-09-14 | Stop reason: SDUPTHER

## 2017-10-03 ENCOUNTER — OFFICE VISIT (OUTPATIENT)
Dept: FAMILY MEDICINE CLINIC | Age: 77
End: 2017-10-03

## 2017-10-03 VITALS
HEART RATE: 66 BPM | RESPIRATION RATE: 16 BRPM | DIASTOLIC BLOOD PRESSURE: 70 MMHG | OXYGEN SATURATION: 96 % | BODY MASS INDEX: 27.58 KG/M2 | HEIGHT: 71 IN | TEMPERATURE: 97.7 F | WEIGHT: 197 LBS | SYSTOLIC BLOOD PRESSURE: 126 MMHG

## 2017-10-03 DIAGNOSIS — D69.6 THROMBOCYTOPENIA (HCC): ICD-10-CM

## 2017-10-03 DIAGNOSIS — R31.0 GROSS HEMATURIA: ICD-10-CM

## 2017-10-03 DIAGNOSIS — J43.9 PULMONARY EMPHYSEMA, UNSPECIFIED EMPHYSEMA TYPE (HCC): ICD-10-CM

## 2017-10-03 DIAGNOSIS — E11.43 CONTROLLED TYPE 2 DIABETES MELLITUS WITH DIABETIC AUTONOMIC NEUROPATHY, WITH LONG-TERM CURRENT USE OF INSULIN (HCC): Primary | ICD-10-CM

## 2017-10-03 DIAGNOSIS — Z79.4 CONTROLLED TYPE 2 DIABETES MELLITUS WITH DIABETIC AUTONOMIC NEUROPATHY, WITH LONG-TERM CURRENT USE OF INSULIN (HCC): Primary | ICD-10-CM

## 2017-10-03 RX ORDER — METFORMIN HYDROCHLORIDE 500 MG/1
500 TABLET, EXTENDED RELEASE ORAL
Qty: 30 TAB | Refills: 5 | Status: SHIPPED | OUTPATIENT
Start: 2017-10-03 | End: 2018-08-14 | Stop reason: SDUPTHER

## 2017-10-03 NOTE — PROGRESS NOTES
HISTORY OF PRESENT ILLNESS  Nguyen Rodriguez is a 69 y.o. male. He was seen for follow up on diabetes and new concern of hematuria. Appointment with Dr Arslan Randolph in 12/2017 and also will get Prevnar and Flu shot at [address]  next week  HPI  Endocrine Review  He is seen for diabetes. Since last visit he reports: no polyuria or polydipsia, no chest pain, dyspnea or TIA's, no unusual visual symptoms, no hypoglycemia, no medication side effects noted, has noticed numbness both feet. Home glucose monitoring: is performed regularly, fasting values range 140-200  He is checking his sugars 2 per day. He reports medication compliance: compliant all of the time. Medication side effects: none. Diabetic diet compliance: compliant all of the time. Lab review: labs reviewed and discussed with patient. Eye exam: overdue. Referral done  His sliding scale Insulin was changed to NPH 12 units bid on last visit  Lab Results   Component Value Date/Time    Hemoglobin A1c 5.7 05/09/2017 10:28 AM    Hemoglobin A1c (POC) 5.4 06/28/2017 11:27 AM        Hematuria  Patient complains of gross hematuria. Onset of hematuria was 1 week ago and was gradual in onset. There is not a history of nephrolithiasis. There is not a history of urologic trauma. Other urologic symptoms include hesitancy, sense of residual urine. Patient admits to history of BPH and chronic smoking and follows urology. Patient denies history of Agent Orange exposure, chronic Jj catheter, trauma, urolithiasis,  surgeries and sexually transmitted diseases. Prior workup has been urology referral.    Review of Systems   Constitutional: Negative for chills, fever and malaise/fatigue. HENT: Negative for congestion, ear pain, sore throat and tinnitus. Eyes: Negative for blurred vision, double vision, pain and discharge. Respiratory: Negative for cough, shortness of breath and wheezing. Cardiovascular: Negative for chest pain, palpitations and leg swelling. Gastrointestinal: Negative for abdominal pain, blood in stool, constipation, diarrhea, nausea and vomiting. Genitourinary: Positive for hematuria. Negative for dysuria, frequency and urgency. Musculoskeletal: Negative for back pain, joint pain and myalgias. Skin: Negative for rash. Neurological: Negative for dizziness, tremors, seizures and headaches. Endo/Heme/Allergies: Negative for polydipsia. Does not bruise/bleed easily. Psychiatric/Behavioral: Negative for depression and substance abuse. The patient is not nervous/anxious. Physical Exam   Constitutional: He is oriented to person, place, and time. He appears well-developed and well-nourished. HENT:   Head: Normocephalic and atraumatic. Right Ear: External ear normal.   Mouth/Throat: Oropharynx is clear and moist. No oropharyngeal exudate. Eyes: Conjunctivae and EOM are normal. Pupils are equal, round, and reactive to light. No scleral icterus. Neck: Normal range of motion. Neck supple. No JVD present. No thyromegaly present. Cardiovascular: Normal rate, regular rhythm, normal heart sounds and intact distal pulses. No murmur heard. Pulmonary/Chest: Effort normal and breath sounds normal. He has no wheezes. Abdominal: Soft. Bowel sounds are normal. He exhibits no distension and no mass. Musculoskeletal: Normal range of motion. He exhibits no edema or tenderness. Lymphadenopathy:     He has no cervical adenopathy. Neurological: He is alert and oriented to person, place, and time. He has normal reflexes. No cranial nerve deficit. Skin: Skin is warm and dry. No rash noted. He is not diaphoretic. Psychiatric: He has a normal mood and affect. Nursing note and vitals reviewed. ASSESSMENT and PLAN  Diagnoses and all orders for this visit:    1.  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 2. Thrombocytopenia (HCC)  -     CBC WITH AUTOMATED DIFF    3. Pulmonary emphysema, unspecified emphysema type ([facility] [address]    4. Gross hematuria  -     UA/M W/RFLX CULTURE, ROUTINE  -     REFERRAL TO UROLOGY    Discussed lifestyle issues and health guidance given  Patient was given an after visit summary which includes diagnoses, vital signs, current medications, instructions and references & authorized prescriptions . Results of labs will be conveyed to patient, once available. Pt verbalized instructions I provided and expressed understanding of discussion that was held today. Follow-up Disposition:  Return in about 3 months (around 1/3/2018) for fasting, follow up, DM, CHO.

## 2017-10-03 NOTE — PATIENT INSTRUCTIONS

## 2017-10-03 NOTE — PROGRESS NOTES
Chief Complaint   Patient presents with    Diabetes     Follow up, Fasting.  concerned about elevated blood glucose levels      1. Have you been to the ER, urgent care clinic since your last visit? Hospitalized since your last visit? No    2. Have you seen or consulted any other health care providers outside of the [address] [place] [address] since your last visit? Include any pap smears or colon screening.  No

## 2017-10-03 NOTE — MR AVS SNAPSHOT
Visit Information Date & Time Provider Department Dept. Phone Encounter #  
 10/3/2017 10:40 AM Leon Moraes MD [address] [place] [address] [phone] [id number] Follow-up Instructions Return in about 3 months (around 1/3/2018) for fasting, follow up, DM, CHO. Upcoming Health Maintenance Date Due  
 EYE EXAM RETINAL OR DILATED Q1 7/10/1950 DTaP/Tdap/Td series (1 - Tdap) 7/10/1962 ZOSTER VACCINE AGE 60> 5/10/2001 GLAUCOMA SCREENING Q2Y 7/10/2006 INFLUENZA AGE 9 TO ADULT 8/1/2017 Pneumococcal 65+ Low/Medium Risk (2 of 2 - PPSV23) 9/16/2017 MICROALBUMIN Q1 12/13/2017 HEMOGLOBIN A1C Q6M 12/28/2017 FOOT EXAM Q1 5/9/2018 LIPID PANEL Q1 5/9/2018 MEDICARE YEARLY EXAM 6/30/2018 Allergies as of 10/3/2017  Review Complete On: 10/3/2017 By: Leon Moraes MD  
 No Known Allergies Current Immunizations  Reviewed on 5/9/2017 Name Date Influenza High Dose Vaccine PF 9/16/2016, 11/7/2015 Influenza Vaccine 7/28/2016, 1/1/2016, 1/1/2015, 10/14/2013 Pneumococcal Conjugate (PCV-13) 9/16/2016, 6/10/2016, 1/1/2016 Not reviewed this visit You Were Diagnosed With   
  
 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, with long-term current use of insulin (HCC)    -  Primary ICD-10-CM: E11.43, Z79.4 ICD-9-CM: 250.60, 337.1, V58.67 Thrombocytopenia ([facility] [address]     ICD-10-CM: D69.6 ICD-9-CM: 287.5 Pulmonary emphysema, unspecified emphysema type ([facility] [address]     ICD-10-CM: J43.9 ICD-9-CM: 492.8 Gross hematuria     ICD-10-CM: R31.0 ICD-9-CM: 599.71 Vitals BP Pulse Temp Resp Height(growth percentile) Weight(growth percentile) 126/70 (BP 1 Location: Left arm, BP Patient Position: Sitting) 66 97.7 °F (36.5 °C) (Oral) 16 5' 11\" (1.803 m) 197 lb (89.4 kg) SpO2 BMI Smoking Status 96% 27.48 kg/m2 Current Every Day Smoker Vitals History BMI and BSA Data Body Mass Index Body Surface Area  
 27.48 kg/m 2 2.12 m 2 Preferred Pharmacy Pharmacy Name Phone [address] [phone] Your Updated Medication List  
  
   
This list is accurate as of: 10/3/17 11:50 AM.  Always use your most recent med list.  
  
  
  
  
 CENTRUM SILVER ULTRA MEN'S PO Take  by mouth. furosemide 40 mg tablet Commonly known as:  LASIX TAKE 1 TABLET BY MOUTH DAILY  
  
 gabapentin 100 mg capsule Commonly known as:  NEURONTIN Take 1 Cap by mouth nightly. glucose blood VI test strips strip Commonly known as:  ONETOUCH ULTRA TEST  
TEST TWICE DAILY  
  
 insulin NPH/insulin regular 100 unit/mL (70-30) injection Commonly known as:  NOVOLIN 70/30, HUMULIN 70/30  
10 Units by SubCUTAneous route Before breakfast and dinner. Indications: type 2 diabetes mellitus Insulin Syringe-Needle U-100 1/2 mL 30 gauge Syrg USE FOR INSULIN INJECTION TWICE DAILY  
  
 meloxicam 15 mg tablet Commonly known as:  MOBIC  
TAKE 1 TABLET BY MOUTH DAILY AFTER BREAKFAST  
  
 metFORMIN  mg tablet Commonly known as:  GLUCOPHAGE XR Take 1 Tab by mouth daily (with dinner). ONETOUCH ULTRA2 monitoring kit Generic drug:  Blood-Glucose Meter  
  
 potassium chloride 20 mEq tablet Commonly known as:  K-DUR, KLOR-CON  
TAKE 1 TABLET BY MOUTH EVERY DAY  
  
 PROAIR HFA 90 mcg/actuation inhaler Generic drug:  albuterol  
  
 tamsulosin 0.4 mg capsule Commonly known as:  FLOMAX Take 1 Cap by mouth daily. VESIcare 10 mg tablet Generic drug:  solifenacin TK 1 T PO QD Prescriptions Sent to Pharmacy Refills  
 metFORMIN ER (GLUCOPHAGE XR) 500 mg tablet 5 Sig: Take 1 Tab by mouth daily (with dinner).   
 Class: Normal  
 Pharmacy: [email or web address] [address] [place] [address] #: [phone] Route: Oral  
  
We Performed the Following CBC WITH AUTOMATED DIFF [80540 CPT(R)] HEMOGLOBIN A1C WITH EAG [97188 CPT(R)] METABOLIC PANEL, COMPREHENSIVE [63819 CPT(R)] MICROALBUMIN, UR, RAND W/ MICROALBUMIN/CREA RATIO [id number] CPT(R)] REFERRAL TO UROLOGY [MNY815 Custom] UA/M W/RFLX CULTURE, ROUTINE [[id number] Custom] Follow-up Instructions Return in about 3 months (around 1/3/2018) for fasting, follow up, DM, CHO. Referral Information Referral ID Referred By Referred To  
  
 [id number] Iris Phillips [facility] [address] [facility] [address] [place], [address] Visits Status Start Date End Date 1 New Request 10/3/17 10/3/18 If your referral has a status of pending review or denied, additional information will be sent to support the outcome of this decision. Patient Instructions Learning About Diabetes Food Guidelines Your Care Instructions Meal planning is important to manage diabetes. It helps keep your blood sugar at a target level (which you set with your doctor). You don't have to eat special foods. You can eat what your family eats, including sweets once in a while. But you do have to pay attention to how often you eat and how much you eat of certain foods. You may want to work with a dietitian or a certified diabetes educator (CDE) to help you plan meals and snacks. A dietitian or CDE can also help you lose weight if that is one of your goals. What should you know about eating carbs? Managing the amount of carbohydrate (carbs) you eat is an important part of healthy meals when you have diabetes. Carbohydrate is found in many foods. · Learn which foods have carbs. And learn the amounts of carbs in different foods.  
¨ Bread, cereal, pasta, and rice have about 15 grams of carbs in a serving. A serving is 1 slice of bread (1 ounce), ½ cup of cooked cereal, or 1/3 cup of cooked pasta or rice. ¨ Fruits have 15 grams of carbs in a serving. A serving is 1 small fresh fruit, such as an apple or orange; ½ of a banana; ½ cup of cooked or canned fruit; ½ cup of fruit juice; 1 cup of melon or raspberries; or 2 tablespoons of dried fruit. ¨ Milk and no-sugar-added yogurt have 15 grams of carbs in a serving. A serving is 1 cup of milk or 2/3 cup of no-sugar-added yogurt. ¨ Starchy vegetables have 15 grams of carbs in a serving. A serving is ½ cup of mashed potatoes or sweet potato; 1 cup winter squash; ½ of a small baked potato; ½ cup of cooked beans; or ½ cup cooked corn or green peas. · Learn how much carbs to eat each day and at each meal. A dietitian or CDE can teach you how to keep track of the amount of carbs you eat. This is called carbohydrate counting. · If you are not sure how to count carbohydrate grams, use the Plate Method to plan meals. It is a good, quick way to make sure that you have a balanced meal. It also helps you spread carbs throughout the day. ¨ Divide your plate by types of foods. Put non-starchy vegetables on half the plate, meat or other protein food on one-quarter of the plate, and a grain or starchy vegetable in the final quarter of the plate. To this you can add a small piece of fruit and 1 cup of milk or yogurt, depending on how many carbs you are supposed to eat at a meal. 
· Try to eat about the same amount of carbs at each meal. Do not \"save up\" your daily allowance of carbs to eat at one meal. 
· Proteins have very little or no carbs per serving. Examples of proteins are beef, chicken, turkey, fish, eggs, tofu, cheese, cottage cheese, and peanut butter. A serving size of meat is 3 ounces, which is about the size of a deck of cards.  Examples of meat substitute serving sizes (equal to 1 ounce of meat) are 1/4 cup of cottage cheese, 1 egg, 1 tablespoon of peanut butter, and ½ cup of tofu. How can you eat out and still eat healthy? · Learn to estimate the serving sizes of foods that have carbohydrate. If you measure food at home, it will be easier to estimate the amount in a serving of restaurant food. · If the meal you order has too much carbohydrate (such as potatoes, corn, or baked beans), ask to have a low-carbohydrate food instead. Ask for a salad or green vegetables. · If you use insulin, check your blood sugar before and after eating out to help you plan how much to eat in the future. · If you eat more carbohydrate at a meal than you had planned, take a walk or do other exercise. This will help lower your blood sugar. What else should you know? · Limit saturated fat, such as the fat from meat and dairy products. This is a healthy choice because people who have diabetes are at higher risk of heart disease. So choose lean cuts of meat and nonfat or low-fat dairy products. Use olive or canola oil instead of butter or shortening when cooking. · Don't skip meals. Your blood sugar may drop too low if you skip meals and take insulin or certain medicines for diabetes. · Check with your doctor before you drink alcohol. Alcohol can cause your blood sugar to drop too low. Alcohol can also cause a bad reaction if you take certain diabetes medicines. Follow-up care is a key part of your treatment and safety. Be sure to make and go to all appointments, and call your doctor if you are having problems. It's also a good idea to know your test results and keep a list of the medicines you take. Where can you learn more? Go to http://meg-zamzam.info/. Enter G220 in the search box to learn more about \"Learning About Diabetes Food Guidelines. \" Current as of: March 13, 2017 Content Version: 11.3 © 9833-9603 [employer].  Care instructions adapted under license by [employer] (which disclaims liability or warranty for this information). If you have questions about a medical condition or this instruction, always ask your healthcare professional. [address] any warranty or liability for your use of this information. Introducing [facility] SERVICES! Ammon Joselatricia introduces Stkr.it patient portal. Now you can access parts of your medical record, email your doctor's office, and request medication refills online. 1. In your internet browser, go to [email or web address] 2. Click on the First Time User? Click Here link in the Sign In box. You will see the New Member Sign Up page. 3. Enter your [email or web address] Access Code exactly as it appears below. You will not need to use this code after youve completed the sign-up process. If you do not sign up before the expiration date, you must request a new code. · [email or web address] Access Code: [email or web address] Expires: 1/1/2018 11:50 AM 
 
4. Enter the last four digits of your Social Security Number (xxxx) and Date of Birth (mm/dd/yyyy) as indicated and click Submit. You will be taken to the next sign-up page. 5. Create a Stkr.it ID. This will be your Stkr.it login ID and cannot be changed, so think of one that is secure and easy to remember. 6. Create a Stkr.it password. You can change your password at any time. 7. Enter your Password Reset Question and Answer. This can be used at a later time if you forget your password. 8. Enter your e-mail address. You will receive e-mail notification when new information is available in [address] 9. Click Sign Up. You can now view and download portions of your medical record. 10. Click the Download Summary menu link to download a portable copy of your medical information. If you have questions, please visit the Frequently Asked Questions section of the [email or web address] website. Remember, Stkr.it is NOT to be used for urgent needs. For medical emergencies, dial 911. Now available from your iPhone and Android! Please provide this summary of care documentation to your next provider. Your primary care clinician is listed as Lenin Ventura. If you have any questions after today's visit, please call [phone].

## 2017-10-03 NOTE — LETTER
10/13/2017 8:36 AM 
 
Mr. Jyoti Siegel [address] [facility] [address] Dear Duglas Adameer: 
 
Please find your most recent results below. Resulted Orders CBC WITH AUTOMATED DIFF Result Value Ref Range WBC 5.3 3.4 - 10.8 x10E3/uL  
 RBC 3.83 (L) 4.14 - 5.80 x10E6/uL Comment: CBC results reported were obtained after the specimen had been warmed 
to 37 degrees C. This may indicate the presence of Cold Agglutinins. HGB 13.5 12.6 - 17.7 g/dL HCT 36.0 (L) 37.5 - 51.0 % MCV 94 79 - 97 fL  
 MCH 35.2 (H) 26.6 - 33.0 pg  
 MCHC 37.5 (H) 31.5 - 35.7 g/dL  
 RDW 15.1 12.3 - 15.4 % PLATELET CANCELED [id number]/XL Comment:  
   Unable to perform an accurate platelet count due to aggregation of 
the platelets. Result canceled by the ancillary NEUTROPHILS 61 Not Estab. % Comment:  
   Occasional metamyelocyte seen on scan. Lymphocytes 24 Not Estab. % MONOCYTES 11 Not Estab. % EOSINOPHILS 3 Not Estab. % BASOPHILS 0 Not Estab. %  
 ABS. NEUTROPHILS 3.2 1.4 - 7.0 x10E3/uL Abs Lymphocytes 1.3 0.7 - 3.1 x10E3/uL  
 ABS. MONOCYTES 0.6 0.1 - 0.9 x10E3/uL  
 ABS. EOSINOPHILS 0.2 0.0 - 0.4 x10E3/uL  
 ABS. BASOPHILS 0.0 0.0 - 0.2 x10E3/uL IMMATURE GRANULOCYTES 1 Not Estab. %  
 ABS. IMM. GRANS. 0.0 0.0 - 0.1 x10E3/uL Hematology comments: Note:   
   Comment:  
   Verified by microscopic examination. Narrative Performed at:  [address]  [id number] : Surekha Godoy MD, Phone:  [phone] METABOLIC PANEL, COMPREHENSIVE Result Value Ref Range Glucose 101 (H) 65 - 99 mg/dL BUN 14 8 - 27 mg/dL Creatinine 0.89 0.76 - 1.27 mg/dL GFR est non-AA 83 >59 mL/min/1.73 GFR est AA 96 >59 mL/min/1.73  
 BUN/Creatinine ratio 16 10 - 24 Sodium 138 134 - 144 mmol/L Potassium 4.8 3.5 - 5.2 mmol/L  Chloride 101 96 - 106 mmol/L  
 CO2 24 18 - 29 mmol/L  
 Calcium 9.4 8.6 - 10.2 mg/dL Protein, total 7.1 6.0 - 8.5 g/dL Albumin 4.4 3.5 - 4.8 g/dL GLOBULIN, TOTAL 2.7 1.5 - 4.5 g/dL A-G Ratio 1.6 1.2 - 2.2 Bilirubin, total 0.7 0.0 - 1.2 mg/dL Alk. phosphatase 73 39 - 117 IU/L  
 AST (SGOT) 21 0 - 40 IU/L  
 ALT (SGPT) 15 0 - 44 IU/L Narrative Performed at:  [address]  [id number] : Emmanuel Flannery MD, Phone:  [phone] HEMOGLOBIN A1C WITH EAG Result Value Ref Range Hemoglobin A1c 5.2 4.8 - 5.6 % Comment:  
            Pre-diabetes: 5.7 - 6.4 Diabetes: >6.4 Glycemic control for adults with diabetes: <7.0 Estimated average glucose 103 mg/dL Narrative Performed at:  [address]  [id number] : Emmanuel Flannery MD, Phone:  [phone] MICROALBUMIN, UR, RAND W/ MICROALBUMIN/CREA RATIO Result Value Ref Range Creatinine, urine 24.8 Not Estab. mg/dL Microalbumin, urine 45.1 Not Estab. ug/mL Microalb/Creat ratio (ug/mg creat.) 181.9 (H) 0.0 - 30.0 mg/g creat Narrative Performed at:  [address]  [id number] : Emmanuel Flannery MD, Phone:  [phone] UA/M W/RFLX CULTURE, ROUTINE Result Value Ref Range Specific Gravity 1.008 1.005 - 1.030  
 pH (UA) 6.5 5.0 - 7.5 Color Yellow Yellow Appearance Cloudy (A) Clear Leukocyte Esterase Negative Negative Protein Negative Negative/Trace Glucose Negative Negative Ketone Negative Negative Blood 3+ (A) Negative Bilirubin Negative Negative Urobilinogen 0.2 0.2 - 1.0 mg/dL Nitrites Negative Negative Microscopic Examination See additional order Comment:  
   Microscopic was indicated and was performed. URINALYSIS REFLEX Comment Comment: This specimen will not reflex to a Urine Culture. Narrative Performed at:  [address]  [id number] : Kitty Callahan MD, Phone:  [phone] MICROSCOPIC EXAMINATION Result Value Ref Range WBC 0-5 0 - 5 /hpf  
 RBC >30 (A) 0 - 2 /hpf Epithelial cells None seen 0 - 10 /hpf Casts None seen None seen /lpf Mucus Present Not Estab. Bacteria Few None seen/Few Narrative Performed at:  [address]  [id number] : Kitty Callahan MD, Phone:  [phone] RECOMMENDATIONS: 
CBC shows low RBC c/w active bleeding. Platelets low too. Will make referral to hematology also HgbA1C 5.2, very low, as discussed during visit, he doesn't need to increase dose of Insulin, as he is concerned about high numbers at home Kidney and liver function normal  
 
Please call me if you have any questions: [phone] Sincerely, Ag Ambriz MD

## 2017-10-04 DIAGNOSIS — D69.6 THROMBOCYTOPENIA (HCC): Primary | ICD-10-CM

## 2017-10-04 LAB
ALBUMIN SERPL-MCNC: 4.4 G/DL (ref 3.5–4.8)
ALBUMIN/CREAT UR: 181.9 MG/G CREAT (ref 0–30)
ALBUMIN/GLOB SERPL: 1.6 {RATIO} (ref 1.2–2.2)
ALP SERPL-CCNC: 73 IU/L (ref 39–117)
ALT SERPL-CCNC: 15 IU/L (ref 0–44)
APPEARANCE UR: ABNORMAL
AST SERPL-CCNC: 21 IU/L (ref 0–40)
BACTERIA #/AREA URNS HPF: ABNORMAL /[HPF]
BASOPHILS # BLD AUTO: 0 X10E3/UL (ref 0–0.2)
BASOPHILS NFR BLD AUTO: 0 %
BILIRUB SERPL-MCNC: 0.7 MG/DL (ref 0–1.2)
BILIRUB UR QL STRIP: NEGATIVE
BUN SERPL-MCNC: 14 MG/DL (ref 8–27)
BUN/CREAT SERPL: 16 (ref 10–24)
CALCIUM SERPL-MCNC: 9.4 MG/DL (ref 8.6–10.2)
CASTS URNS QL MICRO: ABNORMAL /LPF
CHLORIDE SERPL-SCNC: 101 MMOL/L (ref 96–106)
CO2 SERPL-SCNC: 24 MMOL/L (ref 18–29)
COLOR UR: YELLOW
CREAT SERPL-MCNC: 0.89 MG/DL (ref 0.76–1.27)
CREAT UR-MCNC: 24.8 MG/DL
EOSINOPHIL # BLD AUTO: 0.2 X10E3/UL (ref 0–0.4)
EOSINOPHIL NFR BLD AUTO: 3 %
EPI CELLS #/AREA URNS HPF: ABNORMAL /HPF
ERYTHROCYTE [DISTWIDTH] IN BLOOD BY AUTOMATED COUNT: 15.1 % (ref 12.3–15.4)
EST. AVERAGE GLUCOSE BLD GHB EST-MCNC: 103 MG/DL
GLOBULIN SER CALC-MCNC: 2.7 G/DL (ref 1.5–4.5)
GLUCOSE SERPL-MCNC: 101 MG/DL (ref 65–99)
GLUCOSE UR QL: NEGATIVE
HBA1C MFR BLD: 5.2 % (ref 4.8–5.6)
HCT VFR BLD AUTO: 36 % (ref 37.5–51)
HGB BLD-MCNC: 13.5 G/DL (ref 12.6–17.7)
HGB UR QL STRIP: ABNORMAL
IMM GRANULOCYTES # BLD: 0 X10E3/UL (ref 0–0.1)
IMM GRANULOCYTES NFR BLD: 1 %
KETONES UR QL STRIP: NEGATIVE
LEUKOCYTE ESTERASE UR QL STRIP: NEGATIVE
LYMPHOCYTES # BLD AUTO: 1.3 X10E3/UL (ref 0.7–3.1)
LYMPHOCYTES NFR BLD AUTO: 24 %
MCH RBC QN AUTO: 35.2 PG (ref 26.6–33)
MCHC RBC AUTO-ENTMCNC: 37.5 G/DL (ref 31.5–35.7)
MCV RBC AUTO: 94 FL (ref 79–97)
MICRO URNS: ABNORMAL
MICROALBUMIN UR-MCNC: 45.1 UG/ML
MONOCYTES # BLD AUTO: 0.6 X10E3/UL (ref 0.1–0.9)
MONOCYTES NFR BLD AUTO: 11 %
MORPHOLOGY BLD-IMP: ABNORMAL
MUCOUS THREADS URNS QL MICRO: PRESENT
NEUTROPHILS # BLD AUTO: 3.2 X10E3/UL (ref 1.4–7)
NEUTROPHILS NFR BLD AUTO: 61 %
NITRITE UR QL STRIP: NEGATIVE
PH UR STRIP: 6.5 [PH] (ref 5–7.5)
PLATELET # BLD AUTO: ABNORMAL X10E3/UL
POTASSIUM SERPL-SCNC: 4.8 MMOL/L (ref 3.5–5.2)
PROT SERPL-MCNC: 7.1 G/DL (ref 6–8.5)
PROT UR QL STRIP: NEGATIVE
RBC # BLD AUTO: 3.83 X10E6/UL (ref 4.14–5.8)
RBC #/AREA URNS HPF: >30 /HPF
SODIUM SERPL-SCNC: 138 MMOL/L (ref 134–144)
SP GR UR: 1.01 (ref 1–1.03)
URINALYSIS REFLEX, 377202: ABNORMAL
UROBILINOGEN UR STRIP-MCNC: 0.2 MG/DL (ref 0.2–1)
WBC # BLD AUTO: 5.3 X10E3/UL (ref 3.4–10.8)
WBC #/AREA URNS HPF: ABNORMAL /HPF

## 2017-10-04 NOTE — PROGRESS NOTES
Attempted to call pt with his lab results, unsuccessful , left him a voicemail message to return call to office.

## 2017-10-04 NOTE — PROGRESS NOTES
Please call patient and inform  CBC shows low RBC c/w active bleeding. Platelets low too. Will make referral to hematology also  HgbA1C 5.2, very low, as discussed during visit, he doesn't need to increase dose of Insulin, as he is concerned about high numbers at home  Kidney and liver function normal  Need to see urology as soon as possible.   thanks

## 2017-10-13 NOTE — PROGRESS NOTES
Spoke with patient informed of lab results as reviewed by Dr. Jennifer Collins. Informed that he needs to be seen by an hematologist.  Mr. Emily Nicole informed me that he has been seen by the urologist Dr. Kevyn Jones and he has also informed him to be seen by hematology. Patient informed me that he is now awaiting for an appointment with hematology. Letter sent.

## 2017-10-16 RX ORDER — BLOOD SUGAR DIAGNOSTIC
STRIP MISCELLANEOUS
Qty: 100 STRIP | Refills: 0 | Status: SHIPPED | OUTPATIENT
Start: 2017-10-16 | End: 2017-11-30 | Stop reason: SDUPTHER

## 2017-10-18 NOTE — TELEPHONE ENCOUNTER
[facility] [place], [place] [phone]   Pharmacy called back regarding request for the insulin [place] 70/30

## 2017-10-19 RX ORDER — INSULIN HUMAN 100 [IU]/ML
INJECTION, SUSPENSION SUBCUTANEOUS
Qty: 10 ML | Refills: 5 | Status: SHIPPED | OUTPATIENT
Start: 2017-10-19 | End: 2018-06-12 | Stop reason: SDUPTHER

## 2017-11-24 DIAGNOSIS — M16.11 ARTHRITIS OF RIGHT HIP: ICD-10-CM

## 2017-11-24 RX ORDER — MELOXICAM 15 MG/1
TABLET ORAL
Qty: 30 TAB | Refills: 0 | Status: SHIPPED | OUTPATIENT
Start: 2017-11-24 | End: 2017-12-26 | Stop reason: SDUPTHER

## 2017-11-24 RX ORDER — POTASSIUM CHLORIDE 20 MEQ/1
TABLET, EXTENDED RELEASE ORAL
Qty: 90 TAB | Refills: 0 | Status: SHIPPED | OUTPATIENT
Start: 2017-11-24 | End: 2018-03-01 | Stop reason: SDUPTHER

## 2017-11-28 DIAGNOSIS — I35.0 NONRHEUMATIC AORTIC VALVE STENOSIS: ICD-10-CM

## 2017-11-28 DIAGNOSIS — R60.9 EDEMA, UNSPECIFIED TYPE: ICD-10-CM

## 2017-11-28 RX ORDER — FUROSEMIDE 40 MG/1
TABLET ORAL
Qty: 30 TAB | Refills: 0 | Status: SHIPPED | OUTPATIENT
Start: 2017-11-28 | End: 2017-12-26 | Stop reason: SDUPTHER

## 2017-11-30 RX ORDER — BLOOD SUGAR DIAGNOSTIC
STRIP MISCELLANEOUS
Qty: 100 STRIP | Refills: 0 | Status: SHIPPED | OUTPATIENT
Start: 2017-11-30 | End: 2018-01-16 | Stop reason: SDUPTHER

## 2017-12-26 DIAGNOSIS — I35.0 NONRHEUMATIC AORTIC VALVE STENOSIS: ICD-10-CM

## 2017-12-26 DIAGNOSIS — E11.43 CONTROLLED TYPE 2 DIABETES MELLITUS WITH DIABETIC AUTONOMIC NEUROPATHY, WITH LONG-TERM CURRENT USE OF INSULIN (HCC): ICD-10-CM

## 2017-12-26 DIAGNOSIS — Z79.4 CONTROLLED TYPE 2 DIABETES MELLITUS WITH DIABETIC AUTONOMIC NEUROPATHY, WITH LONG-TERM CURRENT USE OF INSULIN (HCC): ICD-10-CM

## 2017-12-26 DIAGNOSIS — R60.9 EDEMA, UNSPECIFIED TYPE: ICD-10-CM

## 2017-12-26 DIAGNOSIS — M16.11 ARTHRITIS OF RIGHT HIP: ICD-10-CM

## 2017-12-27 RX ORDER — GABAPENTIN 100 MG/1
CAPSULE ORAL
Qty: 90 CAP | Refills: 0 | Status: SHIPPED | OUTPATIENT
Start: 2017-12-27 | End: 2018-04-06 | Stop reason: SDUPTHER

## 2017-12-27 RX ORDER — MELOXICAM 15 MG/1
TABLET ORAL
Qty: 30 TAB | Refills: 0 | Status: SHIPPED | OUTPATIENT
Start: 2017-12-27 | End: 2018-01-28 | Stop reason: SDUPTHER

## 2017-12-27 RX ORDER — FUROSEMIDE 40 MG/1
TABLET ORAL
Qty: 30 TAB | Refills: 0 | Status: SHIPPED | OUTPATIENT
Start: 2017-12-27 | End: 2018-01-28 | Stop reason: SDUPTHER

## 2018-01-16 RX ORDER — BLOOD SUGAR DIAGNOSTIC
STRIP MISCELLANEOUS
Qty: 100 STRIP | Refills: 0 | Status: SHIPPED | OUTPATIENT
Start: 2018-01-16

## 2018-01-28 DIAGNOSIS — I35.0 NONRHEUMATIC AORTIC VALVE STENOSIS: ICD-10-CM

## 2018-01-28 DIAGNOSIS — M16.11 ARTHRITIS OF RIGHT HIP: ICD-10-CM

## 2018-01-28 DIAGNOSIS — R60.9 EDEMA, UNSPECIFIED TYPE: ICD-10-CM

## 2018-01-29 RX ORDER — FUROSEMIDE 40 MG/1
TABLET ORAL
Qty: 30 TAB | Refills: 0 | Status: SHIPPED | OUTPATIENT
Start: 2018-01-29 | End: 2018-03-02 | Stop reason: SDUPTHER

## 2018-01-29 RX ORDER — MELOXICAM 15 MG/1
TABLET ORAL
Qty: 30 TAB | Refills: 0 | Status: SHIPPED | OUTPATIENT
Start: 2018-01-29 | End: 2018-03-02 | Stop reason: SDUPTHER

## 2018-02-05 ENCOUNTER — OFFICE VISIT (OUTPATIENT)
Dept: FAMILY MEDICINE CLINIC | Age: 78
End: 2018-02-05

## 2018-02-05 VITALS
SYSTOLIC BLOOD PRESSURE: 120 MMHG | BODY MASS INDEX: 27.86 KG/M2 | HEIGHT: 71 IN | TEMPERATURE: 97.7 F | HEART RATE: 70 BPM | DIASTOLIC BLOOD PRESSURE: 80 MMHG | OXYGEN SATURATION: 96 % | RESPIRATION RATE: 16 BRPM | WEIGHT: 199 LBS

## 2018-02-05 DIAGNOSIS — Z79.4 CONTROLLED TYPE 2 DIABETES MELLITUS WITH DIABETIC AUTONOMIC NEUROPATHY, WITH LONG-TERM CURRENT USE OF INSULIN (HCC): Primary | ICD-10-CM

## 2018-02-05 DIAGNOSIS — E11.43 CONTROLLED TYPE 2 DIABETES MELLITUS WITH DIABETIC AUTONOMIC NEUROPATHY, WITH LONG-TERM CURRENT USE OF INSULIN (HCC): Primary | ICD-10-CM

## 2018-02-05 DIAGNOSIS — J43.9 PULMONARY EMPHYSEMA, UNSPECIFIED EMPHYSEMA TYPE (HCC): ICD-10-CM

## 2018-02-05 DIAGNOSIS — M16.11 ARTHRITIS OF RIGHT HIP: ICD-10-CM

## 2018-02-05 DIAGNOSIS — C67.9 MALIGNANT NEOPLASM OF URINARY BLADDER, UNSPECIFIED SITE (HCC): ICD-10-CM

## 2018-02-05 DIAGNOSIS — C67.3 MALIGNANT NEOPLASM OF ANTERIOR WALL OF URINARY BLADDER (HCC): ICD-10-CM

## 2018-02-05 DIAGNOSIS — D69.6 THROMBOCYTOPENIA (HCC): ICD-10-CM

## 2018-02-05 RX ORDER — PHENAZOPYRIDINE HYDROCHLORIDE 100 MG/1
TABLET, FILM COATED ORAL
Refills: 0 | COMMUNITY
Start: 2018-01-24 | End: 2018-02-05 | Stop reason: ALTCHOICE

## 2018-02-05 RX ORDER — OXYCODONE AND ACETAMINOPHEN 5; 325 MG/1; MG/1
TABLET ORAL
Refills: 0 | COMMUNITY
Start: 2018-01-24 | End: 2018-02-05 | Stop reason: ALTCHOICE

## 2018-02-05 RX ORDER — TRAMADOL HYDROCHLORIDE 50 MG/1
50 TABLET ORAL
Qty: 60 TAB | Refills: 0 | Status: SHIPPED | OUTPATIENT
Start: 2018-02-05 | End: 2018-06-07 | Stop reason: ALTCHOICE

## 2018-02-05 RX ORDER — NITROFURANTOIN 25; 75 MG/1; MG/1
CAPSULE ORAL
Refills: 0 | COMMUNITY
Start: 2017-12-05 | End: 2018-02-05 | Stop reason: ALTCHOICE

## 2018-02-05 NOTE — ASSESSMENT & PLAN NOTE
This condition is managed by Specialist.  Lab Results   Component Value Date/Time    WBC 5.3 10/03/2017 11:55 AM    HGB 13.5 10/03/2017 11:55 AM    HCT 36.0 10/03/2017 11:55 AM    PLATELET CANCELED 37/95/3783 11:55 AM    Creatinine 0.89 10/03/2017 11:55 AM    BUN 14 10/03/2017 11:55 AM    Potassium 4.8 10/03/2017 11:55 AM

## 2018-02-05 NOTE — MR AVS SNAPSHOT
[address] [place] [address] [facility] 
[phone] Patient: Viry Giles MRN: [id number] Visit Information Date & Time Provider Department Dept. Phone Encounter #  
 2018  3:40 PM Pardeep Mcfarlane, [address] [facility] [phone] [id number] Follow-up Instructions Return in about 4 months (around 2018) for fasting, follow up. Upcoming Health Maintenance Date Due HEMOGLOBIN A1C Q6M 4/3/2018 FOOT EXAM Q1 2018 LIPID PANEL Q1 2018 MEDICARE YEARLY EXAM 2018 MICROALBUMIN Q1 10/3/2018 GLAUCOMA SCREENING Q2Y 2020 DTaP/Tdap/Td series (2 - Td) 2028 Allergies as of 2018  Review Complete On: 2018 By: Pardeep Mcfarlane MD  
 No Known Allergies Current Immunizations  Reviewed on 2017 Name Date Influenza High Dose Vaccine PF 2016, 2015 Influenza Vaccine 2016, 2016, 2015, 10/14/2013 Influenza Vaccine (Quad) 10/12/2017 12:00 AM  
 Pneumococcal Conjugate (PCV-13) 2016, 6/10/2016, 2016 Pneumococcal Polysaccharide (PPSV-23) 10/12/2017 12:00 AM  
  
 Not reviewed this visit You Were Diagnosed With   
  
 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, with long-term current use of insulin (HCC)     ICD-10-CM: E11.43, Z79.4 ICD-9-CM: 250.60, 337.1, V58.67 Pulmonary emphysema, unspecified emphysema type ([facility] [address]     ICD-10-CM: J43.9 ICD-9-CM: 492.8 Thrombocytopenia ([facility] [address]     ICD-10-CM: D69.6 ICD-9-CM: 287.5 Vitals BP Pulse Temp Resp Height(growth percentile) Weight(growth percentile) 120/80 (BP 1 Location: Right arm, BP Patient Position: Sitting) 70 97.7 °F (36.5 °C) (Oral) 16 5' 11\" (1.803 m) 199 lb (90.3 kg) SpO2 BMI Smoking Status 96% 27.75 kg/m2 Current Every Day Smoker Vitals History BMI and BSA Data Body Mass Index Body Surface Area  
 27.75 kg/m 2 2.13 m 2 Preferred Pharmacy Pharmacy Name Phone [address] [phone] Your Updated Medication List  
  
   
This list is accurate as of: 2/5/18  4:42 PM.  Always use your most recent med list.  
  
  
  
  
 CENTRUM SILVER ULTRA MEN'S PO Take  by mouth. furosemide 40 mg tablet Commonly known as:  LASIX TAKE 1 TABLET BY MOUTH DAILY  
  
 gabapentin 100 mg capsule Commonly known as:  NEURONTIN  
TAKE 1 CAPSULE BY MOUTH EVERY NIGHT HumuLIN 70/30 100 unit/mL (70-30) injection Generic drug:  insulin NPH/insulin regular INJECT 10 UNITS UNDER THE SKIN BEFORE BREAKFAST AND DINNER; DISCARD AFTER 28 DAYS FROM FIRST USE. Insulin Syringe-Needle U-100 1/2 mL 30 gauge Syrg USE FOR INSULIN INJECTION TWICE DAILY  
  
 meloxicam 15 mg tablet Commonly known as:  MOBIC  
TAKE 1 TABLET BY MOUTH DAILY AFTER BREAKFAST  
  
 metFORMIN  mg tablet Commonly known as:  GLUCOPHAGE XR Take 1 Tab by mouth daily (with dinner). mometasone-formoterol 100-5 mcg/actuation HFA inhaler Commonly known as:  Sha Rimma Take 2 Puffs by inhalation two (2) times a day. ONETOUCH ULTRA TEST strip Generic drug:  glucose blood VI test strips USE TO TEST TWICE DAILY  
  
 ONETOUCH ULTRA2 monitoring kit Generic drug:  Blood-Glucose Meter  
  
 potassium chloride 20 mEq tablet Commonly known as:  K-DUR, KLOR-CON  
TAKE 1 TABLET BY MOUTH EVERY DAY  
  
 PROAIR HFA 90 mcg/actuation inhaler Generic drug:  albuterol  
  
 tamsulosin 0.4 mg capsule Commonly known as:  FLOMAX Take 1 Cap by mouth daily. VESIcare 10 mg tablet Generic drug:  solifenacin TK 1 T PO QD We Performed the Following CBC WITH AUTOMATED DIFF [51524 CPT(R)] HEMOGLOBIN A1C WITH EAG [32621 CPT(R)] METABOLIC PANEL, COMPREHENSIVE [82226 CPT(R)] Follow-up Instructions Return in about 4 months (around 6/5/2018) for fasting, follow up. Patient Instructions Chronic Obstructive Pulmonary Disease (COPD): Care Instructions Your Care Instructions Chronic obstructive pulmonary disease (COPD) is a general term for a group of lung diseases, including emphysema and chronic bronchitis. People with COPD have decreased airflow in and out of the lungs, which makes it hard to breathe. The airways also can get clogged with thick mucus. Cigarette smoking is a major cause of COPD. Although there is no cure for COPD, you can slow its progress. Following your treatment plan and taking care of yourself can help you feel better and live longer. Follow-up care is a key part of your treatment and safety. Be sure to make and go to all appointments, and call your doctor if you are having problems. It's also a good idea to know your test results and keep a list of the medicines you take. How can you care for yourself at home? ?Staying healthy ? · Do not smoke. This is the most important step you can take to prevent more damage to your lungs. If you need help quitting, talk to your doctor about stop-smoking programs and medicines. These can increase your chances of quitting for good. ? · Avoid colds and flu. Get a pneumococcal vaccine shot. If you have had one before, ask your doctor whether you need a second dose. Get the flu vaccine every fall. If you must be around people with colds or the flu, wash your hands often. ? · Avoid secondhand smoke, air pollution, and high altitudes. Also avoid cold, dry air and hot, humid air. Stay at home with your windows closed when air pollution is bad. ?Medicines and oxygen therapy ? · Take your medicines exactly as prescribed. Call your doctor if you think you are having a problem with your medicine. ? · You may be taking medicines such as: ¨ Bronchodilators. These help open your airways and make breathing easier. Bronchodilators are either short-acting (work for 6 to 9 hours) or long-acting (work for 24 hours). You inhale most bronchodilators, so they start to act quickly. Always carry your quick-relief inhaler with you in case you need it while you are away from home. ¨ Corticosteroids (prednisone, budesonide). These reduce airway inflammation. They come in pill or inhaled form. You must take these medicines every day for them to work well. ? · A spacer may help you get more inhaled medicine to your lungs. Ask your doctor or pharmacist if a spacer is right for you. If it is, ask how to use it properly. ? · Do not take any vitamins, over-the-counter medicine, or herbal products without talking to your doctor first.  
? · If your doctor prescribed antibiotics, take them as directed. Do not stop taking them just because you feel better. You need to take the full course of antibiotics. ? · Oxygen therapy boosts the amount of oxygen in your blood and helps you breathe easier. Use the flow rate your doctor has recommended, and do not change it without talking to your doctor first.  
Activity ? · Get regular exercise. Walking is an easy way to get exercise. Start out slowly, and walk a little more each day. ? · Pay attention to your breathing. You are exercising too hard if you cannot talk while you are exercising. ? · Take short rest breaks when doing household chores and other activities. ? · Learn breathing methods-such as breathing through pursed lips-to help you become less short of breath. ? · If your doctor has not set you up with a pulmonary rehabilitation program, talk to him or her about whether rehab is right for you. Rehab includes exercise programs, education about your disease and how to manage it, help with diet and other changes, and emotional support. Diet ? · Eat regular, healthy meals. Use bronchodilators about 1 hour before you eat to make it easier to eat. Eat several small meals instead of three large ones. Drink beverages at the end of the meal. Avoid foods that are hard to chew. ? · Eat foods that contain protein so that you do not lose muscle mass. ? · Talk with your doctor if you gain too much weight or if you lose weight without trying. ?Mental health ? · Talk to your family, friends, or a therapist about your feelings. It is normal to feel frightened, angry, hopeless, helpless, and even guilty. Talking openly about bad feelings can help you cope. If these feelings last, talk to your doctor. When should you call for help? Call 911 anytime you think you may need emergency care. For example, call if: 
? · You have severe trouble breathing. ?Call your doctor now or seek immediate medical care if: 
? · You have new or worse trouble breathing. ? · You cough up blood. ? · You have a fever. ? Watch closely for changes in your health, and be sure to contact your doctor if: 
? · You cough more deeply or more often, especially if you notice more mucus or a change in the color of your mucus. ? · You have new or worse swelling in your legs or belly. ? · You are not getting better as expected. Where can you learn more? Go to http://meg-zamzam.info/. Fredi Bustillo in the search box to learn more about \"Chronic Obstructive Pulmonary Disease (COPD): Care Instructions. \" Current as of: May 12, 2017 Content Version: 11.4 © 2193-0449 [employer]. Care instructions adapted under license by [employer] (which disclaims liability or warranty for this information). If you have questions about a medical condition or this instruction, always ask your healthcare professional. [address] any warranty or liability for your use of this information. Introducing [facility]! Evans Casey introduces JumpCam patient portal. Now you can access parts of your medical record, email your doctor's office, and request medication refills online. 1. In your internet browser, go to [email or web address] 2. Click on the First Time User? Click Here link in the Sign In box. You will see the New Member Sign Up page. 3. Enter your JumpCam Access Code exactly as it appears below. You will not need to use this code after youve completed the sign-up process. If you do not sign up before the expiration date, you must request a new code. · JumpCam Access Code: [email or web address] Expires: 5/6/2018  4:42 PM 
 
4. Enter the last four digits of your Social Security Number (xxxx) and Date of Birth (mm/dd/yyyy) as indicated and click Submit. You will be taken to the next sign-up page. 5. Create a JumpCam ID. This will be your JumpCam login ID and cannot be changed, so think of one that is secure and easy to remember. 6. Create a JumpCam password. You can change your password at any time. 7. Enter your Password Reset Question and Answer. This can be used at a later time if you forget your password. 8. Enter your e-mail address. You will receive e-mail notification when new information is available in [address] 9. Click Sign Up. You can now view and download portions of your medical record. 10. Click the Download Summary menu link to download a portable copy of your medical information. If you have questions, please visit the Frequently Asked Questions section of the JumpCam website. Remember, JumpCam is NOT to be used for urgent needs. For medical emergencies, dial 911. Now available from your iPhone and Android! Please provide this summary of care documentation to your next provider. Your primary care clinician is listed as Kofi Ace. If you have any questions after today's visit, please call [phone].

## 2018-02-05 NOTE — ASSESSMENT & PLAN NOTE
Stable, based on history, physical exam and review of pertinent labs, studies and medications; meds reconciled; continue current treatment plan, lifestyle modifications recommended, medication compliance emphasized.   Key COPD Medications             PROAIR HFA 90 mcg/actuation inhaler         Lab Results   Component Value Date/Time    WBC 5.3 10/03/2017 11:55 AM    HGB 13.5 10/03/2017 11:55 AM    HCT 36.0 10/03/2017 11:55 AM    PLATELET CANCELED 75/45/3399 11:55 AM

## 2018-02-05 NOTE — PATIENT INSTRUCTIONS
Chronic Obstructive Pulmonary Disease (COPD): Care Instructions  Your Care Instructions    Chronic obstructive pulmonary disease (COPD) is a general term for a group of lung diseases, including emphysema and chronic bronchitis. People with COPD have decreased airflow in and out of the lungs, which makes it hard to breathe. The airways also can get clogged with thick mucus. Cigarette smoking is a major cause of COPD. Although there is no cure for COPD, you can slow its progress. Following your treatment plan and taking care of yourself can help you feel better and live longer. Follow-up care is a key part of your treatment and safety. Be sure to make and go to all appointments, and call your doctor if you are having problems. It's also a good idea to know your test results and keep a list of the medicines you take. How can you care for yourself at home? ?Staying healthy  ? · Do not smoke. This is the most important step you can take to prevent more damage to your lungs. If you need help quitting, talk to your doctor about stop-smoking programs and medicines. These can increase your chances of quitting for good. ? · Avoid colds and flu. Get a pneumococcal vaccine shot. If you have had one before, ask your doctor whether you need a second dose. Get the flu vaccine every fall. If you must be around people with colds or the flu, wash your hands often. ? · Avoid secondhand smoke, air pollution, and high altitudes. Also avoid cold, dry air and hot, humid air. Stay at home with your windows closed when air pollution is bad. ?Medicines and oxygen therapy  ? · Take your medicines exactly as prescribed. Call your doctor if you think you are having a problem with your medicine. ? · You may be taking medicines such as:  ¨ Bronchodilators. These help open your airways and make breathing easier. Bronchodilators are either short-acting (work for 6 to 9 hours) or long-acting (work for 24 hours).  You inhale most bronchodilators, so they start to act quickly. Always carry your quick-relief inhaler with you in case you need it while you are away from home. ¨ Corticosteroids (prednisone, budesonide). These reduce airway inflammation. They come in pill or inhaled form. You must take these medicines every day for them to work well. ? · A spacer may help you get more inhaled medicine to your lungs. Ask your doctor or pharmacist if a spacer is right for you. If it is, ask how to use it properly. ? · Do not take any vitamins, over-the-counter medicine, or herbal products without talking to your doctor first.   ? · If your doctor prescribed antibiotics, take them as directed. Do not stop taking them just because you feel better. You need to take the full course of antibiotics. ? · Oxygen therapy boosts the amount of oxygen in your blood and helps you breathe easier. Use the flow rate your doctor has recommended, and do not change it without talking to your doctor first.   Activity  ? · Get regular exercise. Walking is an easy way to get exercise. Start out slowly, and walk a little more each day. ? · Pay attention to your breathing. You are exercising too hard if you cannot talk while you are exercising. ? · Take short rest breaks when doing household chores and other activities. ? · Learn breathing methods-such as breathing through pursed lips-to help you become less short of breath. ? · If your doctor has not set you up with a pulmonary rehabilitation program, talk to him or her about whether rehab is right for you. Rehab includes exercise programs, education about your disease and how to manage it, help with diet and other changes, and emotional support. Diet  ? · Eat regular, healthy meals. Use bronchodilators about 1 hour before you eat to make it easier to eat. Eat several small meals instead of three large ones. Drink beverages at the end of the meal. Avoid foods that are hard to chew.    ? · Eat foods that contain protein so that you do not lose muscle mass. ? · Talk with your doctor if you gain too much weight or if you lose weight without trying. ?Mental health  ? · Talk to your family, friends, or a therapist about your feelings. It is normal to feel frightened, angry, hopeless, helpless, and even guilty. Talking openly about bad feelings can help you cope. If these feelings last, talk to your doctor. When should you call for help? Call 911 anytime you think you may need emergency care. For example, call if:  ? · You have severe trouble breathing. ?Call your doctor now or seek immediate medical care if:  ? · You have new or worse trouble breathing. ? · You cough up blood. ? · You have a fever. ? Watch closely for changes in your health, and be sure to contact your doctor if:  ? · You cough more deeply or more often, especially if you notice more mucus or a change in the color of your mucus. ? · You have new or worse swelling in your legs or belly. ? · You are not getting better as expected. Where can you learn more? Go to http://meg-zamzam.info/. Cecilia Mendez in the search box to learn more about \"Chronic Obstructive Pulmonary Disease (COPD): Care Instructions. \"  Current as of: May 12, 2017  Content Version: 11.4  © 8565-4618 [employer]. Care instructions adapted under license by [employer] (which disclaims liability or warranty for this information). If you have questions about a medical condition or this instruction, always ask your healthcare professional. [address] any warranty or liability for your use of this information.

## 2018-02-05 NOTE — ASSESSMENT & PLAN NOTE
Well Controlled, based on history, physical exam and review of pertinent labs, studies and medications; meds reconciled; continue current treatment plan, lifestyle modifications recommended, medication compliance emphasized. Key Antihyperglycemic Medications             HUMULIN 70/30 100 unit/mL (70-30) injection  (Taking) INJECT 10 UNITS UNDER THE SKIN BEFORE BREAKFAST AND DINNER; DISCARD AFTER 28 DAYS FROM FIRST USE.    metFORMIN ER (GLUCOPHAGE XR) 500 mg tablet  (Taking) Take 1 Tab by mouth daily (with dinner).         Other Key Diabetic Medications             gabapentin (NEURONTIN) 100 mg capsule  (Taking) TAKE 1 CAPSULE BY MOUTH EVERY NIGHT        Lab Results   Component Value Date/Time    Hemoglobin A1c 5.2 10/03/2017 11:55 AM    Hemoglobin A1c (POC) 5.4 06/28/2017 11:27 AM    Glucose 101 10/03/2017 11:55 AM    Creatinine 0.89 10/03/2017 11:55 AM    Microalb/Creat ratio (ug/mg creat.) 181.9 10/03/2017 11:56 AM    Cholesterol, total 147 05/09/2017 10:28 AM    HDL Cholesterol 42 05/09/2017 10:28 AM    LDL, calculated 89 05/09/2017 10:28 AM    Triglyceride 78 05/09/2017 10:28 AM     Diabetic Foot and Eye Exam HM Status   Topic Date Due    Eye Exam  07/10/1950    Diabetic Foot Care  05/09/2018

## 2018-02-05 NOTE — PROGRESS NOTES
Chief Complaint   Patient presents with    Diabetes     Follow up    Cholesterol Problem    Medication Refill     inquiring for something stronger for hip pain. currently taking meloxicam     1. Have you been to the ER, urgent care clinic since your last visit? Hospitalized since your last visit? No    2. Have you seen or consulted any other health care providers outside of the [address] [place] [address] since your last visit? Include any pap smears or colon screening.  No

## 2018-02-05 NOTE — PROGRESS NOTES
HISTORY OF PRESENT ILLNESS  Lexi Wayne is a 69 y.o. male. He was seen for 4 months follow up on diabetes, COPD and thrombocytopenia. Recently diagnosed with bladder cancer. Still smoking, even counseled on every visit. HPI  Endocrine Review  He is seen for diabetes. Since last visit he reports: no polyuria or polydipsia, no chest pain, dyspnea or TIA's, no unusual visual symptoms, no hypoglycemia, no medication side effects noted, has noticed numbness both feet. Home glucose monitoring: is performed regularly, fasting values range 140-200  He is checking his sugars 2 per day. He reports medication compliance: compliant all of the time. Medication side effects: none. Diabetic diet compliance: compliant all of the time. Lab review: labs reviewed and discussed with patient. Eye exam: overdue. Referral done  His sliding scale Insulin was changed to NPH 12 units bid on last visit  Has seen Dr Bart Paz in early January. Lab Results   Component Value Date/Time    Hemoglobin A1c 5.2 10/03/2017 11:55 AM    Hemoglobin A1c (POC) 5.4 06/28/2017 11:27 AM      COPD  Patient complains of cough, fatigue and shortness of breath. Symptoms began several years ago. Symptoms chronic dyspnea: severity = mild: course of sx: well controlled  becomes dyspneic after 5 blocks  symptoms worse with exertion. does worsen with exertion. Sputum is clear in small amounts. Patient currently is not on home oxygen therapy. Nirali Pete Respiratory history: pneumonia, COPD and history of 10  pack years tobacco use  Follows Dr Gabi Cresw. PFT not c/w obstructive disease. He was advised to continue on Dulera and prn Albuterol. in spite of counseling, he is still smoking  He stopped all inhalers    Review of Systems   Constitutional: Negative for chills, fever and malaise/fatigue. HENT: Negative for congestion, ear pain, sore throat and tinnitus. Eyes: Negative for blurred vision, double vision, pain and discharge.    Respiratory: Positive for cough and shortness of breath. Negative for wheezing. Cardiovascular: Negative for chest pain, palpitations and leg swelling. Gastrointestinal: Negative for abdominal pain, blood in stool, constipation, diarrhea, nausea and vomiting. Genitourinary: Negative for dysuria, frequency, hematuria and urgency. Musculoskeletal: Negative for back pain, joint pain and myalgias. Pain right hip   Skin: Negative for rash. Neurological: Negative for dizziness, tremors, seizures and headaches. Endo/Heme/Allergies: Negative for polydipsia. Does not bruise/bleed easily. Psychiatric/Behavioral: Negative for depression and substance abuse. The patient is not nervous/anxious. Physical Exam   Constitutional: He is oriented to person, place, and time. He appears well-developed and well-nourished. HENT:   Head: Normocephalic and atraumatic. Right Ear: External ear normal.   Mouth/Throat: Oropharynx is clear and moist. No oropharyngeal exudate. Eyes: Conjunctivae and EOM are normal. Pupils are equal, round, and reactive to light. No scleral icterus. Neck: Normal range of motion. Neck supple. No JVD present. No thyromegaly present. Cardiovascular: Normal rate, regular rhythm, normal heart sounds and intact distal pulses. No murmur heard. Pulmonary/Chest: Effort normal and breath sounds normal. He has no wheezes. Abdominal: Soft. Bowel sounds are normal. He exhibits no distension and no mass. Musculoskeletal: Normal range of motion. He exhibits no edema or tenderness. Lymphadenopathy:     He has no cervical adenopathy. Neurological: He is alert and oriented to person, place, and time. He has normal reflexes. No cranial nerve deficit. Skin: Skin is warm and dry. No rash noted. He is not diaphoretic. Psychiatric: He has a normal mood and affect. Nursing note and vitals reviewed. ASSESSMENT and PLAN  Diagnoses and all orders for this visit:    1.  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 Key Antihyperglycemic Medications             HUMULIN 70/30 100 unit/mL (70-30) injection  (Taking) INJECT 10 UNITS UNDER THE SKIN BEFORE BREAKFAST AND DINNER; DISCARD AFTER 28 DAYS FROM FIRST USE.    metFORMIN ER (GLUCOPHAGE XR) 500 mg tablet  (Taking) Take 1 Tab by mouth daily (with dinner). Other Key Diabetic Medications             gabapentin (NEURONTIN) 100 mg capsule  (Taking) TAKE 1 CAPSULE BY MOUTH EVERY NIGHT        Lab Results   Component Value Date/Time    Hemoglobin A1c 5.2 10/03/2017 11:55 AM    Hemoglobin A1c (POC) 5.4 06/28/2017 11:27 AM    Glucose 101 10/03/2017 11:55 AM    Creatinine 0.89 10/03/2017 11:55 AM    Microalb/Creat ratio (ug/mg creat.) 181.9 10/03/2017 11:56 AM    Cholesterol, total 147 05/09/2017 10:28 AM    HDL Cholesterol 42 05/09/2017 10:28 AM    LDL, calculated 89 05/09/2017 10:28 AM    Triglyceride 78 05/09/2017 10:28 AM     Diabetic Foot and Eye Exam HM Status   Topic Date Due    Eye Exam  07/10/1950    Diabetic Foot Care  05/09/2018       Orders:  -     METABOLIC PANEL, COMPREHENSIVE  -     HEMOGLOBIN A1C WITH EAG    2. Pulmonary emphysema, unspecified emphysema type ([facility]  Assessment & Plan:  Stable, based on history, physical exam and review of pertinent labs, studies and medications; meds reconciled; continue current treatment plan, lifestyle modifications recommended, medication compliance emphasized.   Key COPD Medications             PROAIR HFA 90 mcg/actuation inhaler         Lab Results   Component Value Date/Time    WBC 5.3 10/03/2017 11:55 AM    HGB 13.5 10/03/2017 11:55 AM    HCT 36.0 10/03/2017 11:55 AM    PLATELET CANCELED 10/03/2017 11:55 AM       Orders:  -     mometasone-formoterol (DULERA) 100-5 mcg/actuation HFA inhaler; Take 2 Puffs by inhalation two (2) times a day. -     CBC WITH AUTOMATED DIFF    3. Thrombocytopenia ([address]  Assessment & Plan: This condition is managed by Specialist.  Lab Results   Component Value Date/Time    WBC 5.3 10/03/2017 11:55 AM    HGB 13.5 10/03/2017 11:55 AM    HCT 36.0 10/03/2017 11:55 AM    PLATELET CANCELED 05/24/2856 11:55 AM    Creatinine 0.89 10/03/2017 11:55 AM    BUN 14 10/03/2017 11:55 AM    Potassium 4.8 10/03/2017 11:55 AM       Orders:  -     CBC WITH AUTOMATED DIFF    4. Arthritis of right hip  -     traMADol (ULTRAM) 50 mg tablet; Take 1 Tab by mouth two (2) times daily as needed for Pain. Max Daily Amount: 100 mg.    5. Malignant neoplasm of urinary bladder, unspecified site [facility])    Will get records from urology. Discussed lifestyle issues and health guidance given  Patient was given an after visit summary which includes diagnoses, vital signs, current medications, instructions and references & authorized prescriptions . Results of labs will be conveyed to patient, once available. Pt verbalized instructions I provided and expressed understanding of discussion that was held today. Follow-up Disposition:  Return in about 4 months (around 6/5/2018) for fasting, follow up.

## 2018-02-06 LAB
ALBUMIN SERPL-MCNC: 4.5 G/DL (ref 3.5–4.8)
ALBUMIN/GLOB SERPL: 1.7 {RATIO} (ref 1.2–2.2)
ALP SERPL-CCNC: 80 IU/L (ref 39–117)
ALT SERPL-CCNC: 15 IU/L (ref 0–44)
AST SERPL-CCNC: 19 IU/L (ref 0–40)
BASOPHILS # BLD AUTO: 0.1 X10E3/UL (ref 0–0.2)
BASOPHILS NFR BLD AUTO: 1 %
BILIRUB SERPL-MCNC: 0.5 MG/DL (ref 0–1.2)
BUN SERPL-MCNC: 20 MG/DL (ref 8–27)
BUN/CREAT SERPL: 21 (ref 10–24)
CALCIUM SERPL-MCNC: 9.4 MG/DL (ref 8.6–10.2)
CHLORIDE SERPL-SCNC: 98 MMOL/L (ref 96–106)
CO2 SERPL-SCNC: 23 MMOL/L (ref 18–29)
CREAT SERPL-MCNC: 0.95 MG/DL (ref 0.76–1.27)
EOSINOPHIL # BLD AUTO: 0.3 X10E3/UL (ref 0–0.4)
EOSINOPHIL NFR BLD AUTO: 4 %
ERYTHROCYTE [DISTWIDTH] IN BLOOD BY AUTOMATED COUNT: 15 % (ref 12.3–15.4)
EST. AVERAGE GLUCOSE BLD GHB EST-MCNC: 103 MG/DL
GFR SERPLBLD CREATININE-BSD FMLA CKD-EPI: 77 ML/MIN/1.73
GFR SERPLBLD CREATININE-BSD FMLA CKD-EPI: 90 ML/MIN/1.73
GLOBULIN SER CALC-MCNC: 2.6 G/DL (ref 1.5–4.5)
GLUCOSE SERPL-MCNC: 79 MG/DL (ref 65–99)
HBA1C MFR BLD: 5.2 % (ref 4.8–5.6)
HCT VFR BLD AUTO: 36.7 % (ref 37.5–51)
HGB BLD-MCNC: 13 G/DL (ref 13–17.7)
IMM GRANULOCYTES # BLD: 0.1 X10E3/UL (ref 0–0.1)
IMM GRANULOCYTES NFR BLD: 1 %
LYMPHOCYTES # BLD AUTO: 1.6 X10E3/UL (ref 0.7–3.1)
LYMPHOCYTES NFR BLD AUTO: 19 %
MCH RBC QN AUTO: 32 PG (ref 26.6–33)
MCHC RBC AUTO-ENTMCNC: 35.4 G/DL (ref 31.5–35.7)
MCV RBC AUTO: 90 FL (ref 79–97)
MONOCYTES # BLD AUTO: 0.6 X10E3/UL (ref 0.1–0.9)
MONOCYTES NFR BLD AUTO: 7 %
MORPHOLOGY BLD-IMP: ABNORMAL
NEUTROPHILS # BLD AUTO: 5.6 X10E3/UL (ref 1.4–7)
NEUTROPHILS NFR BLD AUTO: 68 %
PLATELET # BLD AUTO: 110 X10E3/UL (ref 150–379)
POTASSIUM SERPL-SCNC: 4.6 MMOL/L (ref 3.5–5.2)
PROT SERPL-MCNC: 7.1 G/DL (ref 6–8.5)
RBC # BLD AUTO: 4.06 X10E6/UL (ref 4.14–5.8)
SODIUM SERPL-SCNC: 136 MMOL/L (ref 134–144)
WBC # BLD AUTO: 8.2 X10E3/UL (ref 3.4–10.8)

## 2018-02-06 NOTE — PROGRESS NOTES
Please inform patient  Cbc shows low platelets, chronic  Kidney, liver and average sugar are very normal, hgba1c 5.2  thanks

## 2018-03-01 RX ORDER — POTASSIUM CHLORIDE 20 MEQ/1
TABLET, EXTENDED RELEASE ORAL
Qty: 90 TAB | Refills: 0 | Status: SHIPPED | OUTPATIENT
Start: 2018-03-01 | End: 2018-06-16 | Stop reason: SDUPTHER

## 2018-03-01 RX ORDER — TAMSULOSIN HYDROCHLORIDE 0.4 MG/1
CAPSULE ORAL
Qty: 90 CAP | Refills: 0 | Status: SHIPPED | OUTPATIENT
Start: 2018-03-01 | End: 2018-06-16 | Stop reason: SDUPTHER

## 2018-03-02 DIAGNOSIS — M16.11 ARTHRITIS OF RIGHT HIP: ICD-10-CM

## 2018-03-02 DIAGNOSIS — R60.9 EDEMA, UNSPECIFIED TYPE: ICD-10-CM

## 2018-03-02 DIAGNOSIS — I35.0 NONRHEUMATIC AORTIC VALVE STENOSIS: ICD-10-CM

## 2018-03-02 RX ORDER — MELOXICAM 15 MG/1
TABLET ORAL
Qty: 30 TAB | Refills: 0 | Status: SHIPPED | OUTPATIENT
Start: 2018-03-02 | End: 2018-04-06 | Stop reason: SDUPTHER

## 2018-03-02 RX ORDER — FUROSEMIDE 40 MG/1
TABLET ORAL
Qty: 30 TAB | Refills: 0 | Status: SHIPPED | OUTPATIENT
Start: 2018-03-02 | End: 2018-04-06 | Stop reason: SDUPTHER

## 2018-03-02 NOTE — TELEPHONE ENCOUNTER
Refill. [phone]  Requested Prescriptions     Pending Prescriptions Disp Refills    furosemide (LASIX) 40 mg tablet 30 Tab 0    meloxicam (MOBIC) 15 mg tablet 30 Tab 0

## 2018-04-06 DIAGNOSIS — R60.9 EDEMA, UNSPECIFIED TYPE: ICD-10-CM

## 2018-04-06 DIAGNOSIS — E11.43 CONTROLLED TYPE 2 DIABETES MELLITUS WITH DIABETIC AUTONOMIC NEUROPATHY, WITH LONG-TERM CURRENT USE OF INSULIN (HCC): ICD-10-CM

## 2018-04-06 DIAGNOSIS — I35.0 NONRHEUMATIC AORTIC VALVE STENOSIS: ICD-10-CM

## 2018-04-06 DIAGNOSIS — M16.11 ARTHRITIS OF RIGHT HIP: ICD-10-CM

## 2018-04-06 DIAGNOSIS — Z79.4 CONTROLLED TYPE 2 DIABETES MELLITUS WITH DIABETIC AUTONOMIC NEUROPATHY, WITH LONG-TERM CURRENT USE OF INSULIN (HCC): ICD-10-CM

## 2018-04-06 RX ORDER — GABAPENTIN 100 MG/1
CAPSULE ORAL
Qty: 90 CAP | Refills: 0 | Status: SHIPPED | OUTPATIENT
Start: 2018-04-06 | End: 2018-06-07 | Stop reason: DRUGHIGH

## 2018-04-06 RX ORDER — FUROSEMIDE 40 MG/1
TABLET ORAL
Qty: 30 TAB | Refills: 0 | Status: SHIPPED | OUTPATIENT
Start: 2018-04-06 | End: 2018-05-11 | Stop reason: SDUPTHER

## 2018-04-06 RX ORDER — MELOXICAM 15 MG/1
TABLET ORAL
Qty: 30 TAB | Refills: 0 | Status: SHIPPED | OUTPATIENT
Start: 2018-04-06 | End: 2018-05-11 | Stop reason: SDUPTHER

## 2018-05-11 DIAGNOSIS — R60.9 EDEMA, UNSPECIFIED TYPE: ICD-10-CM

## 2018-05-11 DIAGNOSIS — I35.0 NONRHEUMATIC AORTIC VALVE STENOSIS: ICD-10-CM

## 2018-05-11 DIAGNOSIS — M16.11 ARTHRITIS OF RIGHT HIP: ICD-10-CM

## 2018-05-11 RX ORDER — FUROSEMIDE 40 MG/1
TABLET ORAL
Qty: 30 TAB | Refills: 0 | Status: SHIPPED | OUTPATIENT
Start: 2018-05-11 | End: 2018-06-16 | Stop reason: SDUPTHER

## 2018-05-11 RX ORDER — MELOXICAM 15 MG/1
TABLET ORAL
Qty: 30 TAB | Refills: 0 | Status: SHIPPED | OUTPATIENT
Start: 2018-05-11 | End: 2018-06-07 | Stop reason: ALTCHOICE

## 2018-05-23 ENCOUNTER — OFFICE VISIT (OUTPATIENT)
Dept: CARDIOLOGY CLINIC | Age: 78
End: 2018-05-23

## 2018-05-23 VITALS
HEIGHT: 71 IN | HEART RATE: 72 BPM | DIASTOLIC BLOOD PRESSURE: 60 MMHG | WEIGHT: 195.6 LBS | SYSTOLIC BLOOD PRESSURE: 130 MMHG | RESPIRATION RATE: 16 BRPM | BODY MASS INDEX: 27.38 KG/M2

## 2018-05-23 DIAGNOSIS — Z72.0 TOBACCO ABUSE: ICD-10-CM

## 2018-05-23 DIAGNOSIS — I35.0 NONRHEUMATIC AORTIC VALVE STENOSIS: Primary | ICD-10-CM

## 2018-05-23 DIAGNOSIS — I10 BENIGN ESSENTIAL HTN: ICD-10-CM

## 2018-05-23 DIAGNOSIS — Z01.818 PREOPERATIVE CLEARANCE: ICD-10-CM

## 2018-05-23 DIAGNOSIS — J44.9 CHRONIC OBSTRUCTIVE PULMONARY DISEASE, UNSPECIFIED COPD TYPE (HCC): ICD-10-CM

## 2018-05-23 NOTE — LETTER
2018 11:48 PM 
 
Patient:  Gregory Sanchez YOB: 1940 Date of Visit: 2018 Dear Maudie Apgar, MD 
[place] [id number] VIA Facsimile: [phone] Aneudy Cassidy MD 
[phone] VIA In Basket 
 : 
Mr. Olena Bansal is 70 yo M with mild to moderate aortic stenosis noted before, COPD, essential hypertension, referred by his urologist, Dr. Jeremy Magaña for preop cardiac evaluation. From a symptom standpoint, he has some baseline shortness of breath, but this is unchanged. He denies any chest pain, palpitations, lightheadedness or dizziness. He has a urology surgery on 2018. He is compensated on exam with clear lungs. He does have a II/VI systolic murmur at the left upper sternal border. Blood pressure was 130/60 with a heart rate of 72. His EKG was normal sinus rhythm, right bundle-branch block, heart rate of 72 and right bundle-branch block previously known. Assessment and Plan: 1. Preop cardiac evaluation. He is stable from a cardiac standpoint and low risk for cardiac complications. He does have mild to moderate aortic stenosis in the past.  Not contributing at this time. 2. Aortic stenosis. Will have him follow up with a same day echocardiogram in six months. This has been mild to moderate in the past by echocardiogram and on exam.  Followed by Dr. Lexis Nair in the past but would like me to follow him. 3. Essential hypertension. Blood pressure is controlled. 4. Tobacco use. Encouraged cessation. 5. COPD. If you have questions, please do not hesitate to call me. Sincerely, Corinne Rosas MD

## 2018-05-23 NOTE — PROGRESS NOTES
[facility] [place]: [facility], [place]  [phone]    History of Present Illness:   Mr. Devika Houser is 68 yo M with mild to moderate aortic stenosis noted before, COPD, essential hypertension, referred by his urologist, Dr. Laura Campbell for preop cardiac evaluation. From a symptom standpoint, he has some baseline shortness of breath, but this is unchanged. He denies any chest pain, palpitations, lightheadedness or dizziness. He has a urology surgery on 05/30/2018. He is compensated on exam with clear lungs. He does have a II/VI systolic murmur at the left upper sternal border. Blood pressure was 130/60 with a heart rate of 72. His EKG was normal sinus rhythm, right bundle-branch block, heart rate of 72 and right bundle-branch block previously known. Assessment and Plan:   1. Preop cardiac evaluation. He is stable from a cardiac standpoint and low risk for cardiac complications. He does have mild to moderate aortic stenosis in the past.  Not contributing at this time. 2. Aortic stenosis. Will have him follow up with a same day echocardiogram in six months. This has been mild to moderate in the past by echocardiogram and on exam.  Followed by Dr. Sujata Caceres in the past but would like me to follow him. 3. Essential hypertension. Blood pressure is controlled. 4. Tobacco use. Encouraged cessation. 5. COPD. He  has a past medical history of COPD (chronic obstructive pulmonary disease) ([address] Diabetes ([facility] [address] [facility] Enlarged prostate; Moderate aortic stenosis; and Pneumonia. All other systems negative except as above. PE  Vitals:    05/23/18 1428   BP: 130/60   Pulse: 72   Resp: 16   Weight: 195 lb 9.6 oz (88.7 kg)   Height: 5' 11\" (1.803 m)    Body mass index is 27.28 kg/(m^2).    General appearance - alert, well appearing, and in no distress  Mental status - affect appropriate to mood  Eyes - sclera anicteric, moist mucous membranes  Neck - supple, no JVD  Chest - clear to auscultation, no wheezes, rales or rhonchi  Heart - normal rate, regular rhythm, normal S1, S2, II/IV systolic murmur LUSB  Abdomen - soft, nontender, nondistended, no masses or organomegaly  Neurological -no focal deficit  Extremities - peripheral pulses normal, no pedal edema    Recent Labs:  Lab Results   Component Value Date/Time    Cholesterol, total 147 05/09/2017 10:28 AM    HDL Cholesterol 42 05/09/2017 10:28 AM    LDL, calculated 89 05/09/2017 10:28 AM    Triglyceride 78 05/09/2017 10:28 AM     Lab Results   Component Value Date/Time    Creatinine 0.95 02/05/2018 04:52 PM     Lab Results   Component Value Date/Time    BUN 20 02/05/2018 04:52 PM     Lab Results   Component Value Date/Time    Potassium 4.6 02/05/2018 04:52 PM     Lab Results   Component Value Date/Time    Hemoglobin A1c 5.2 02/05/2018 04:52 PM     Lab Results   Component Value Date/Time    HGB 13.0 02/05/2018 04:52 PM     Lab Results   Component Value Date/Time    PLATELET 075 (L) 90/53/1423 04:52 PM       Reviewed:  Past Medical History:   Diagnosis Date    [facility] disease) [facility]     Diabetes ([facility]     Enlarged prostate     Moderate aortic stenosis     echo June 2016 [place]     Pneumonia      History   Smoking Status    Current Every Day Smoker    Packs/day: 0.50   Smokeless Tobacco    Never Used     Comment: 1 pack a day  ( 16-18 ) a day      History   Alcohol Use    8.4 oz/week    14 Cans of beer, 0 Standard drinks or equivalent per week     Comment: Social      No Known Allergies    Current Outpatient Prescriptions   Medication Sig    meloxicam (MOBIC) 15 mg tablet TAKE 1 TABLET BY MOUTH DAILY AFTER BREAKFAST    furosemide (LASIX) 40 mg tablet TAKE 1 TABLET BY MOUTH DAILY    gabapentin (NEURONTIN) 100 mg capsule TAKE 1 CAPSULE BY MOUTH EVERY NIGHT    potassium chloride (K-DUR, KLOR-CON) 20 mEq tablet TAKE 1 TABLET BY MOUTH EVERY DAY    tamsulosin (FLOMAX) 0.4 mg capsule TAKE 1 CAPSULE BY MOUTH DAILY    mometasone-formoterol (DULERA) 100-5 mcg/actuation HFA inhaler Take 2 Puffs by inhalation two (2) times a day.  ONETOUCH ULTRA TEST strip USE TO TEST TWICE DAILY    HUMULIN 70/30 100 unit/mL (70-30) injection INJECT 10 UNITS UNDER THE SKIN BEFORE BREAKFAST AND DINNER; DISCARD AFTER 28 DAYS FROM FIRST USE.  metFORMIN ER (GLUCOPHAGE XR) 500 mg tablet Take 1 Tab by mouth daily (with dinner).  Insulin Syringe-Needle U-100 1/2 mL 30 gauge syrg USE FOR INSULIN INJECTION TWICE DAILY    PROAIR HFA 90 mcg/actuation inhaler     MULTIVIT-MIN/FA/LYCOPEN/LUTEIN (CENTRUM SILVER ULTRA MEN'S PO) Take  by mouth.  ONETOUCH ULTRA2 monitoring kit     traMADol (ULTRAM) 50 mg tablet Take 1 Tab by mouth two (2) times daily as needed for Pain. Max Daily Amount: 100 mg.    VESICARE 10 mg tablet TK 1 T PO QD     No current facility-administered medications for this visit.         Kathy Woo MD  [facility] heart and Vascular [place]  [address] [facility] [address]  [address]

## 2018-06-07 ENCOUNTER — OFFICE VISIT (OUTPATIENT)
Dept: FAMILY MEDICINE CLINIC | Age: 78
End: 2018-06-07

## 2018-06-07 VITALS
DIASTOLIC BLOOD PRESSURE: 70 MMHG | BODY MASS INDEX: 27.16 KG/M2 | RESPIRATION RATE: 18 BRPM | HEIGHT: 71 IN | TEMPERATURE: 97.6 F | SYSTOLIC BLOOD PRESSURE: 120 MMHG | OXYGEN SATURATION: 93 % | HEART RATE: 69 BPM | WEIGHT: 194 LBS

## 2018-06-07 DIAGNOSIS — Z79.4 TYPE 2 DIABETES MELLITUS WITH DIABETIC NEUROPATHY, WITH LONG-TERM CURRENT USE OF INSULIN (HCC): Primary | ICD-10-CM

## 2018-06-07 DIAGNOSIS — D69.6 THROMBOCYTOPENIA (HCC): ICD-10-CM

## 2018-06-07 DIAGNOSIS — J43.9 PULMONARY EMPHYSEMA, UNSPECIFIED EMPHYSEMA TYPE (HCC): ICD-10-CM

## 2018-06-07 DIAGNOSIS — Z79.4 CONTROLLED TYPE 2 DIABETES MELLITUS WITH DIABETIC AUTONOMIC NEUROPATHY, WITH LONG-TERM CURRENT USE OF INSULIN (HCC): ICD-10-CM

## 2018-06-07 DIAGNOSIS — E11.43 CONTROLLED TYPE 2 DIABETES MELLITUS WITH DIABETIC AUTONOMIC NEUROPATHY, WITH LONG-TERM CURRENT USE OF INSULIN (HCC): ICD-10-CM

## 2018-06-07 DIAGNOSIS — E11.40 TYPE 2 DIABETES MELLITUS WITH DIABETIC NEUROPATHY, WITH LONG-TERM CURRENT USE OF INSULIN (HCC): Primary | ICD-10-CM

## 2018-06-07 DIAGNOSIS — C67.3 MALIGNANT NEOPLASM OF ANTERIOR WALL OF URINARY BLADDER (HCC): ICD-10-CM

## 2018-06-07 RX ORDER — GABAPENTIN 300 MG/1
300 CAPSULE ORAL
Qty: 90 CAP | Refills: 1 | Status: SHIPPED | OUTPATIENT
Start: 2018-06-07 | End: 2018-12-19 | Stop reason: SDUPTHER

## 2018-06-07 RX ORDER — OXYCODONE AND ACETAMINOPHEN 5; 325 MG/1; MG/1
TABLET ORAL
Refills: 0 | COMMUNITY
Start: 2018-05-30 | End: 2018-06-07 | Stop reason: ALTCHOICE

## 2018-06-07 RX ORDER — BUPROPION HYDROCHLORIDE 150 MG/1
TABLET, EXTENDED RELEASE ORAL
Refills: 1 | COMMUNITY
Start: 2018-03-28 | End: 2018-06-07 | Stop reason: SINTOL

## 2018-06-07 RX ORDER — TIOTROPIUM BROMIDE AND OLODATEROL 3.124; 2.736 UG/1; UG/1
SPRAY, METERED RESPIRATORY (INHALATION)
Refills: 5 | COMMUNITY
Start: 2018-03-28 | End: 2019-03-12 | Stop reason: ALTCHOICE

## 2018-06-07 RX ORDER — PHENAZOPYRIDINE HYDROCHLORIDE 100 MG/1
TABLET, FILM COATED ORAL
Refills: 0 | COMMUNITY
Start: 2018-05-30 | End: 2018-06-07 | Stop reason: ALTCHOICE

## 2018-06-07 NOTE — LETTER
6/12/2018 4:12 PM 
 
Mr. Gris Feldman [address] [facility] [id number] Dear Gris Feldman: 
 
Please find your most recent results below. Resulted Orders CBC WITH AUTOMATED DIFF Result Value Ref Range WBC 7.0 3.4 - 10.8 x10E3/uL  
 RBC 3.73 (L) 4.14 - 5.80 x10E6/uL Comment: CBC results reported were obtained after the specimen had been warmed 
to 37 degrees C. This may indicate the presence of Cold Agglutinins. The RBC, HCT, and red cell indices may be inaccurate due to RBC 
agglutination. HGB 12.7 (L) 13.0 - 17.7 g/dL HCT 34.6 (L) 37.5 - 51.0 % MCV 93 79 - 97 fL  
 MCH 34.0 (H) 26.6 - 33.0 pg  
 MCHC 36.7 (H) 31.5 - 35.7 g/dL  
 RDW 15.3 12.3 - 15.4 % PLATELET CANCELED [id number]/BM Comment:  
   Unable to perform an accurate platelet count due to aggregation of 
the platelets. Result canceled by the ancillary NEUTROPHILS 68 Not Estab. % Comment:  
   Occasional metamyelocyte seen on scan. Lymphocytes 18 Not Estab. % MONOCYTES 9 Not Estab. % EOSINOPHILS 4 Not Estab. % BASOPHILS 0 Not Estab. %  
 ABS. NEUTROPHILS 4.7 1.4 - 7.0 x10E3/uL Abs Lymphocytes 1.3 0.7 - 3.1 x10E3/uL  
 ABS. MONOCYTES 0.6 0.1 - 0.9 x10E3/uL  
 ABS. EOSINOPHILS 0.3 0.0 - 0.4 x10E3/uL  
 ABS. BASOPHILS 0.0 0.0 - 0.2 x10E3/uL IMMATURE GRANULOCYTES 1 Not Estab. %  
 ABS. IMM. GRANS. 0.1 0.0 - 0.1 x10E3/uL Hematology comments: Note:   
   Comment:  
   Verified by microscopic examination. Narrative Performed at:  [address]  [id number] : Sandrine Alcala MD, Phone:  [phone] METABOLIC PANEL, COMPREHENSIVE Result Value Ref Range Glucose 115 (H) 65 - 99 mg/dL BUN 17 8 - 27 mg/dL Creatinine 0.83 0.76 - 1.27 mg/dL GFR est non-AA 85 >59 mL/min/1.73 GFR est AA 99 >59 mL/min/1.73  
 BUN/Creatinine ratio 20 10 - 24  Sodium 139 134 - 144 mmol/L  
 Potassium 4.7 3.5 - 5.2 mmol/L Chloride 104 96 - 106 mmol/L  
 CO2 23 18 - 29 mmol/L Comment: **Effective June 11, 2018 Carbon Dioxide, Total** 
  reference interval will be changing to: Age                  Male          Female 
     0 days   - 30 days         16 - 34        16 - 34 
    31 days   -  1 year         15 - 22        15 - 25 
     2 years  -  5 years        16 - 34        14 - 32 
     6 years  - 15 years        23 - 32        22 - 32 
               >12 years        21 - 32        18 - 34 Calcium 9.2 8.6 - 10.2 mg/dL Protein, total 6.6 6.0 - 8.5 g/dL Albumin 4.1 3.5 - 4.8 g/dL GLOBULIN, TOTAL 2.5 1.5 - 4.5 g/dL A-G Ratio 1.6 1.2 - 2.2 Bilirubin, total 0.5 0.0 - 1.2 mg/dL Alk. phosphatase 82 39 - 117 IU/L  
 AST (SGOT) 21 0 - 40 IU/L  
 ALT (SGPT) 16 0 - 44 IU/L Narrative Performed at:  [address]  [id number] : Oswald Rey MD, Phone:  [phone] LIPID PANEL Result Value Ref Range Cholesterol, total 126 100 - 199 mg/dL Triglyceride 71 0 - 149 mg/dL HDL Cholesterol 36 (L) >39 mg/dL VLDL, calculated 14 5 - 40 mg/dL LDL, calculated 76 0 - 99 mg/dL Narrative Performed at:  [address]  [id number] : Oswald Rey MD, Phone:  [phone] HEMOGLOBIN A1C WITH EAG Result Value Ref Range Hemoglobin A1c 5.3 4.8 - 5.6 % Comment:  
            Pre-diabetes: 5.7 - 6.4 Diabetes: >6.4 Glycemic control for adults with diabetes: <7.0 Estimated average glucose 105 mg/dL Narrative Performed at:  [address]  [id number] : Oswald Rey MD, Phone:  [phone] URINALYSIS W/ RFLX MICROSCOPIC Result Value Ref Range Specific Gravity 1.013 1.005 - 1.030  
 pH (UA) 6.5 5.0 - 7.5 Color Yellow Yellow Appearance Clear Clear Leukocyte Esterase 1+ (A) Negative Protein 1+ (A) Negative/Trace Glucose Negative Negative Ketone Negative Negative Blood 2+ (A) Negative Bilirubin Negative Negative Urobilinogen 0.2 0.2 - 1.0 mg/dL Nitrites Negative Negative Microscopic Examination See additional order Comment:  
   Microscopic was indicated and was performed. Narrative Performed at:  [address]  [id number] : Gold Melara MD, Phone:  [phone] MICROSCOPIC EXAMINATION Result Value Ref Range WBC 11-30 (A) 0 - 5 /hpf  
 RBC >30 (A) 0 - 2 /hpf Epithelial cells None seen 0 - 10 /hpf Casts None seen None seen /lpf Mucus Present Not Estab. Bacteria Moderate (A) None seen/Few Narrative Performed at:  [address]  [id number] : Gold Melara MD, Phone:  [phone] CVD REPORT Result Value Ref Range INTERPRETATION Note Comment:  
   Supplemental report is available. Narrative Performed at:  [address] [place] [address]  [id number] : Justen Breaux MD, Phone:  [phone] RECOMMENDATIONS: 
Average sugar persistently excellent 5.3. Also cholesterol results normal  
CBC shows anemia and urine positive for large amount of blood, from his bladder cancer. He follows up with urology. Kidney and liver function normal 
 
Please call me if you have any questions: [phone] Sincerely, Jose Andrade MD

## 2018-06-07 NOTE — MR AVS SNAPSHOT
[address] [place] 
[phone] Patient: Chelsi Pimentel MRN: [id number] Visit Information Date & Time Provider Department Dept. Phone Encounter #  
 6/7/2018  8:20 AM Ashlee Thompson  [facility] [phone] [id number] Follow-up Instructions Return in about 4 months (around 10/7/2018) for fasting, medicare wellness, physical.  
  
Your Appointments 11/27/2018  1:00 PM  
ECHO CARDIOGRAMS 2D with ECHO, [facility] (APPLE SCHEDULING) Appt Note: echo for AS and 6 mfu after  
 [address] [facility] [address] [place] [address]  
[address] [facility]  
  
    
 11/27/2018  2:00 PM  
ESTABLISHED PATIENT with Lexa Gorman MD  
[facility] ([address]) Appt Note: echo for AS and 6 mfu after  
 [address] [facility] [address] [place] [address]  
[address] [facility] [address] Upcoming Health Maintenance Date Due  
 FOOT EXAM Q1 5/9/2018 LIPID PANEL Q1 5/9/2018 MEDICARE YEARLY EXAM 6/30/2018 Influenza Age 5 to Adult 8/1/2018 HEMOGLOBIN A1C Q6M 8/5/2018 MICROALBUMIN Q1 10/3/2018 GLAUCOMA SCREENING Q2Y 1/8/2020 DTaP/Tdap/Td series (2 - Td) 2/5/2028 Allergies as of 6/7/2018  Review Complete On: 6/7/2018 By: Ashlee Thompson MD  
 No Known Allergies Current Immunizations  Reviewed on 5/9/2017 Name Date Influenza High Dose Vaccine PF 9/16/2016, 11/7/2015 Influenza Vaccine 7/28/2016, 1/1/2016, 1/1/2015, 10/14/2013 Influenza Vaccine (Quad) 10/12/2017 12:00 AM  
 Pneumococcal Conjugate (PCV-13) 9/16/2016, 6/10/2016, 1/1/2016 Pneumococcal Polysaccharide (PPSV-23) 10/12/2017 12:00 AM  
  
 Not reviewed this visit You Were Diagnosed With   
  
 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 3 Vitals BP Pulse Temp Resp Height(growth percentile) Weight(growth percentile) 120/70 (BP 1 Location: Right arm, BP Patient Position: Sitting) 69 97.6 °F (36.4 °C) (Oral) 18 5' 11\" (1.803 m) 194 lb (88 kg) SpO2 BMI Smoking Status 93% 27.06 kg/m2 Current Every Day Smoker Vitals History BMI and BSA Data Body Mass Index Body Surface Area  
 27.06 kg/m 2 2.1 m 2 Preferred Pharmacy Pharmacy Name Phone [address] [phone] Your Updated Medication List  
  
   
This list is accurate as of 6/7/18  9:01 AM.  Always use your most recent med list.  
  
  
  
  
 CENTRUM SILVER ULTRA MEN'S PO Take  by mouth. furosemide 40 mg tablet Commonly known as:  LASIX TAKE 1 TABLET BY MOUTH DAILY  
  
 gabapentin 300 mg capsule Commonly known as:  NEURONTIN Take 1 Cap by mouth nightly. HumuLIN 70/30 U-100 Insulin 100 unit/mL (70-30) injection Generic drug:  insulin NPH/insulin regular INJECT 10 UNITS UNDER THE SKIN BEFORE BREAKFAST AND DINNER; DISCARD AFTER 28 DAYS FROM FIRST USE. Insulin Syringe-Needle U-100 1/2 mL 30 gauge Syrg USE FOR INSULIN INJECTION TWICE DAILY  
  
 metFORMIN  mg tablet Commonly known as:  GLUCOPHAGE XR Take 1 Tab by mouth daily (with dinner). mometasone-formoterol 100-5 mcg/actuation HFA inhaler Commonly known as:  Raymona Mends Take 2 Puffs by inhalation two (2) times a day. ONETOUCH ULTRA TEST strip Generic drug:  glucose blood VI test strips USE TO TEST TWICE DAILY  
  
 ONETOUCH ULTRA2 monitoring kit Generic drug:  Blood-Glucose Meter  
  
 potassium chloride 20 mEq tablet Commonly known as:  K-DUR, KLOR-CON  
TAKE 1 TABLET BY MOUTH EVERY DAY  
  
 PROAIR HFA 90 mcg/actuation inhaler Generic drug:  albuterol STIOLTO RESPIMAT 2.5-2.5 mcg/actuation Mist  
Generic drug:  tiotropium-olodaterol INHALE 2 PUFFS PO QD  
  
 tamsulosin 0.4 mg capsule Commonly known as:  FLOMAX TAKE 1 CAPSULE BY MOUTH DAILY  
  
 VESIcare 10 mg tablet Generic drug:  solifenacin TK 1 T PO QD Prescriptions Sent to Pharmacy Refills  
 gabapentin (NEURONTIN) 300 mg capsule 1 Sig: Take 1 Cap by mouth nightly. Class: Normal  
 Pharmacy: [facility], [address] [facility] Ph #: [phone] Route: Oral  
  
We Performed the Following CBC WITH AUTOMATED DIFF [61652 CPT(R)] HEMOGLOBIN A1C WITH EAG [61202 CPT(R)]  DIABETES FOOT EXAM [7 Custom] LIPID PANEL [50980 CPT(R)] METABOLIC PANEL, COMPREHENSIVE [38943 CPT(R)] URINALYSIS W/ RFLX MICROSCOPIC [67652 CPT(R)] Follow-up Instructions Return in about 4 months (around 10/7/2018) for fasting, medicare wellness, physical.  
  
  
Patient Instructions Diabetes and Preventing Falls: Care Instructions Your Care Instructions If you are an older adult who has diabetes, you may have a higher risk of falling. Complications of diabetes-such as nerve damage, foot problems, and reduced vision-may increase your risk of a fall. Some of your medicines also may add to your risk. By making your home safer, you can lower your risk of falling. Doing things to prevent diabetes complications may also help to lower your risk. You can make your home safer with a few simple measures. Follow-up care is a key part of your treatment and safety. Be sure to make and go to all appointments, and call your doctor if you are having problems. It's also a good idea to know your test results and keep a list of the medicines you take. How can you care for yourself at home? Taking care of yourself · Keep your blood sugar at a target level (which you set with your doctor). · Exercise regularly to improve your strength, muscle tone, and balance. Walk if you can. Swimming may be a good choice if you cannot walk easily. · Have your vision checked as often as your doctor recommends. It is usually once a year or more often if you have eye problems. · Know the side effects of the medicines you take. Ask your doctor or pharmacist whether the medicines you take can affect your balance. Sleeping pills or sedatives can affect your balance. · Limit the amount of alcohol you drink. Alcohol can impair your balance and other senses. · Have your doctor check your feet during each visit. If you have a foot problem, see your doctor. Preventing falls at home · Remove raised doorway thresholds, throw rugs, and clutter. Repair loose carpet or raised areas in the floor. · Move furniture and electrical cords to keep them out of walking paths. · Use nonskid floor wax, and wipe up spills right away, especially on ceramic tile floors. · If you use a walker or cane, put rubber tips on it. If you use crutches, clean the bottoms of them regularly with an abrasive pad, such as steel wool. · Keep your house well lit, especially [facility], and outside walkways. Use night-lights in areas such as hallways and bathrooms.  Add extra light switches or use remote switches (such as switches that go on or off when you clap your hands) to make it easier to turn lights on if you have to get up during the night. · Install sturdy handrails on stairways. Put grab bars near your shower, bathtub, and toilet. · Store household items on low shelves so that you do not have to climb or reach high. Or use a reaching device that you can get at a medical supply store. If you have to climb for something, use a step stool with handrails, or ask someone to get it for you. · Keep a cordless phone and a flashlight with new batteries by your bed. If possible, put a phone in each of the main rooms of your house, or carry a cell phone in case you fall and cannot reach a phone. Or you can wear a device around your neck or wrist. You push a button that sends a signal for help. · Wear low-heeled shoes that fit well and give your feet good support. Use footwear with nonskid soles. Check the heels and soles of your shoes for wear. Repair or replace worn heels or soles. · Do not wear socks without shoes on wood floors. · Walk on the grass when the sidewalks are slippery. If you live in an area that gets snow and ice in the winter, sprinkle salt on slippery steps and sidewalks. Where can you learn more? Go to http://meg-zamzam.info/. Enter Y346 in the search box to learn more about \"Diabetes and Preventing Falls: Care Instructions. \" Current as of: May 12, 2017 Content Version: 11.4 © 0714-7426 [employer]. Care instructions adapted under license by [employer] (which disclaims liability or warranty for this information). If you have questions about a medical condition or this instruction, always ask your healthcare professional. [address] any warranty or liability for your use of this information. Introducing \[facility]\"" & HEALTH SERVICES!    
 [facility] introduces [employer] patient portal. Now you can access parts of your medical record, email your doctor's office, and request medication refills online. 1. In your internet browser, go to [email or web address] 2. Click on the First Time User? Click Here link in the Sign In box. You will see the New Member Sign Up page. 3. Enter your MissingLINK Access Code exactly as it appears below. You will not need to use this code after youve completed the sign-up process. If you do not sign up before the expiration date, you must request a new code. · MissingLINK Access Code: [email or web address] Expires: 9/5/2018  9:01 AM 
 
4. Enter the last four digits of your Social Security Number (xxxx) and Date of Birth (mm/dd/yyyy) as indicated and click Submit. You will be taken to the next sign-up page. 5. Create a MissingLINK ID. This will be your MissingLINK login ID and cannot be changed, so think of one that is secure and easy to remember. 6. Create a MissingLINK password. You can change your password at any time. 7. Enter your Password Reset Question and Answer. This can be used at a later time if you forget your password. 8. Enter your e-mail address. You will receive e-mail notification when new information is available in [address] 9. Click Sign Up. You can now view and download portions of your medical record. 10. Click the Download Summary menu link to download a portable copy of your medical information. If you have questions, please visit the Frequently Asked Questions section of the MissingLINK website. Remember, MissingLINK is NOT to be used for urgent needs. For medical emergencies, dial 911. Now available from your iPhone and Android! Please provide this summary of care documentation to your next provider. Your primary care clinician is listed as Valentino Arts. If you have any questions after today's visit, please call [phone].

## 2018-06-07 NOTE — PROGRESS NOTES
HISTORY OF PRESENT ILLNESS  Chay Larios is a 69 y.o. male. he was seen for follow up on DM, dyslipidemia, peripheral neuropathy, recent bladder cancer  HPI  Endocrine Review  He is seen for diabetes. Since last visit he reports: no polyuria or polydipsia, no chest pain, dyspnea or TIA's, no unusual visual symptoms, no hypoglycemia, no medication side effects noted, has noticed numbness both feet. Home glucose monitoring: is performed regularly, fasting values range 140-200  He is checking his sugars 2 per day. He reports medication compliance: compliant all of the time. Medication side effects: none. Diabetic diet compliance: compliant all of the time. Lab review: labs reviewed and discussed with patient. Eye exam: overdue. Referral done  His sliding scale Insulin was changed to NPH 12 units bid on last visit  Has seen Dr Herman Maki in early January. Lab Results   Component Value Date/Time    Hemoglobin A1c 5.2 02/05/2018 04:52 PM    Hemoglobin A1c (POC) 5.4 06/28/2017 11:27 AM        COPD  Patient complains of cough, fatigue and shortness of breath. Symptoms began several years ago. Symptoms chronic dyspnea: severity = mild: course of sx: well controlled  becomes dyspneic after 5 blocks  symptoms worse with exertion. does worsen with exertion. Sputum is clear in small amounts. Patient currently is not on home oxygen therapy. Patel Solis Respiratory history: pneumonia, COPD and history of 10  pack years tobacco use  Follows Dr Jose J Trevizo. PFT not c/w obstructive disease. He was advised to continue on Dulera and prn Albuterol. in spite of counseling, he is still smoking  He stopped all inhalers  Review of Systems   Constitutional: Negative for chills, fever and malaise/fatigue. HENT: Negative for congestion, ear pain, sore throat and tinnitus. Eyes: Negative for blurred vision, double vision, pain and discharge. Respiratory: Negative for cough, shortness of breath and wheezing.     Cardiovascular: Negative for chest pain, palpitations and leg swelling. Gastrointestinal: Negative for abdominal pain, blood in stool, constipation, diarrhea, nausea and vomiting. Genitourinary: Negative for dysuria, frequency, hematuria and urgency. Musculoskeletal: Negative for back pain, joint pain and myalgias. Skin: Negative for rash. Neurological: Negative for dizziness, tremors, seizures and headaches. Balance problem and burning feet both sides   Endo/Heme/Allergies: Negative for polydipsia. Does not bruise/bleed easily. Psychiatric/Behavioral: Negative for depression and substance abuse. The patient is not nervous/anxious. Physical Exam   Constitutional: He is oriented to person, place, and time. He appears well-developed and well-nourished. HENT:   Head: Normocephalic and atraumatic. Right Ear: External ear normal.   Mouth/Throat: Oropharynx is clear and moist. No oropharyngeal exudate. Eyes: Conjunctivae and EOM are normal. Pupils are equal, round, and reactive to light. No scleral icterus. Neck: Normal range of motion. Neck supple. No JVD present. No thyromegaly present. Cardiovascular: Normal rate, regular rhythm, normal heart sounds and intact distal pulses. No murmur heard. Pulmonary/Chest: Effort normal and breath sounds normal. He has no wheezes. Abdominal: Soft. Bowel sounds are normal. He exhibits no distension and no mass. Musculoskeletal: Normal range of motion. He exhibits no edema or tenderness. Feet:warm, good capillary refill, no trophic changes or ulcerative lesions, normal DP and PT pulses and normal sensory exam     Lymphadenopathy:     He has no cervical adenopathy. Neurological: He is alert and oriented to person, place, and time. He has normal reflexes. No cranial nerve deficit. Skin: Skin is warm and dry. No rash noted. He is not diaphoretic. Psychiatric: He has a normal mood and affect. Nursing note and vitals reviewed.       ASSESSMENT and PLAN  Diagnoses and all orders for this visit:    1. Type 2 diabetes mellitus with diabetic neuropathy, with long-term current use of insulin ([facility])  -     gabapentin (NEURONTIN) 300 mg capsule; Take 1 Cap by mouth nightly. -      DIABETES FOOT EXAM  -     METABOLIC PANEL, COMPREHENSIVE  -     LIPID PANEL  -     HEMOGLOBIN A1C WITH EAG    2. Controlled type 2 diabetes mellitus with diabetic autonomic neuropathy, with long-term current use of insulin (HCC)  -     METABOLIC PANEL, COMPREHENSIVE  -     LIPID PANEL    3. Thrombocytopenia (HCC)  -     CBC WITH AUTOMATED DIFF    4. Pulmonary emphysema, unspecified emphysema type ([facility] [address] [address]  -     CBC WITH AUTOMATED DIFF    5. 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 Results of labs will be conveyed to patient, once available. Pt verbalized instructions I provided and expressed understanding of discussion that was held today.   Follow-up Disposition:  Return in about 4 months (around 10/7/2018) for fasting, medicare wellness, physical.

## 2018-06-07 NOTE — PROGRESS NOTES
Chief Complaint   Patient presents with    Diabetes     Follow up, Fasting     Other     gait is off balance , unsteady , some blurred vision.  Nasal Congestion     runny nose at time and nasal congestion     1. Have you been to the ER, urgent care clinic since your last visit? Hospitalized since your last visit? No    2. Have you seen or consulted any other health care providers outside of the [facility]\"" since your last visit? Include any pap smears or colon screening.  No

## 2018-06-07 NOTE — PATIENT INSTRUCTIONS
Diabetes and Preventing Falls: Care Instructions  Your Care Instructions    If you are an older adult who has diabetes, you may have a higher risk of falling. Complications of diabetes-such as nerve damage, foot problems, and reduced vision-may increase your risk of a fall. Some of your medicines also may add to your risk. By making your home safer, you can lower your risk of falling. Doing things to prevent diabetes complications may also help to lower your risk. You can make your home safer with a few simple measures. Follow-up care is a key part of your treatment and safety. Be sure to make and go to all appointments, and call your doctor if you are having problems. It's also a good idea to know your test results and keep a list of the medicines you take. How can you care for yourself at home? Taking care of yourself  · Keep your blood sugar at a target level (which you set with your doctor). · Exercise regularly to improve your strength, muscle tone, and balance. Walk if you can. Swimming may be a good choice if you cannot walk easily. · Have your vision checked as often as your doctor recommends. It is usually once a year or more often if you have eye problems. · Know the side effects of the medicines you take. Ask your doctor or pharmacist whether the medicines you take can affect your balance. Sleeping pills or sedatives can affect your balance. · Limit the amount of alcohol you drink. Alcohol can impair your balance and other senses. · Have your doctor check your feet during each visit. If you have a foot problem, see your doctor. Preventing falls at home  · Remove raised doorway thresholds, throw rugs, and clutter. Repair loose carpet or raised areas in the floor. · Move furniture and electrical cords to keep them out of walking paths. · Use nonskid floor wax, and wipe up spills right away, especially on ceramic tile floors. · If you use a walker or cane, put rubber tips on it.  If you use crutches, clean the bottoms of them regularly with an abrasive pad, such as steel wool. · Keep your house well lit, especially [place] [place], and outside walkways. Use night-lights in areas such as hallways and bathrooms. Add extra light switches or use remote switches (such as switches that go on or off when you clap your hands) to make it easier to turn lights on if you have to get up during the night. · Install sturdy handrails on stairways. Put grab bars near your shower, bathtub, and toilet. · Store household items on low shelves so that you do not have to climb or reach high. Or use a reaching device that you can get at a medical supply store. If you have to climb for something, use a step stool with handrails, or ask someone to get it for you. · Keep a cordless phone and a flashlight with new batteries by your bed. If possible, put a phone in each of the main rooms of your house, or carry a cell phone in case you fall and cannot reach a phone. Or you can wear a device around your neck or wrist. You push a button that sends a signal for help. · Wear low-heeled shoes that fit well and give your feet good support. Use footwear with nonskid soles. Check the heels and soles of your shoes for wear. Repair or replace worn heels or soles. · Do not wear socks without shoes on wood floors. · Walk on the grass when the sidewalks are slippery. If you live in an area that gets snow and ice in the winter, sprinkle salt on slippery steps and sidewalks. Where can you learn more? Go to http://meg-zamzam.info/. Enter Q460 in the search box to learn more about \"Diabetes and Preventing Falls: Care Instructions. \"  Current as of: May 12, 2017  Content Version: 11.4  © 1566-1705 [employer]. Care instructions adapted under license by [employer] (which disclaims liability or warranty for this information).  If you have questions about a medical condition or this instruction, always ask your healthcare professional. [place] [address] any warranty or liability for your use of this information.

## 2018-06-08 LAB
ALBUMIN SERPL-MCNC: 4.1 G/DL (ref 3.5–4.8)
ALBUMIN/GLOB SERPL: 1.6 {RATIO} (ref 1.2–2.2)
ALP SERPL-CCNC: 82 IU/L (ref 39–117)
ALT SERPL-CCNC: 16 IU/L (ref 0–44)
APPEARANCE UR: CLEAR
AST SERPL-CCNC: 21 IU/L (ref 0–40)
BACTERIA #/AREA URNS HPF: ABNORMAL /[HPF]
BASOPHILS # BLD AUTO: 0 X10E3/UL (ref 0–0.2)
BASOPHILS NFR BLD AUTO: 0 %
BILIRUB SERPL-MCNC: 0.5 MG/DL (ref 0–1.2)
BILIRUB UR QL STRIP: NEGATIVE
BUN SERPL-MCNC: 17 MG/DL (ref 8–27)
BUN/CREAT SERPL: 20 (ref 10–24)
CALCIUM SERPL-MCNC: 9.2 MG/DL (ref 8.6–10.2)
CASTS URNS QL MICRO: ABNORMAL /LPF
CHLORIDE SERPL-SCNC: 104 MMOL/L (ref 96–106)
CHOLEST SERPL-MCNC: 126 MG/DL (ref 100–199)
CO2 SERPL-SCNC: 23 MMOL/L (ref 18–29)
COLOR UR: YELLOW
CREAT SERPL-MCNC: 0.83 MG/DL (ref 0.76–1.27)
EOSINOPHIL # BLD AUTO: 0.3 X10E3/UL (ref 0–0.4)
EOSINOPHIL NFR BLD AUTO: 4 %
EPI CELLS #/AREA URNS HPF: ABNORMAL /HPF
ERYTHROCYTE [DISTWIDTH] IN BLOOD BY AUTOMATED COUNT: 15.3 % (ref 12.3–15.4)
EST. AVERAGE GLUCOSE BLD GHB EST-MCNC: 105 MG/DL
GFR SERPLBLD CREATININE-BSD FMLA CKD-EPI: 85 ML/MIN/1.73
GFR SERPLBLD CREATININE-BSD FMLA CKD-EPI: 99 ML/MIN/1.73
GLOBULIN SER CALC-MCNC: 2.5 G/DL (ref 1.5–4.5)
GLUCOSE SERPL-MCNC: 115 MG/DL (ref 65–99)
GLUCOSE UR QL: NEGATIVE
HBA1C MFR BLD: 5.3 % (ref 4.8–5.6)
HCT VFR BLD AUTO: 34.6 % (ref 37.5–51)
HDLC SERPL-MCNC: 36 MG/DL
HGB BLD-MCNC: 12.7 G/DL (ref 13–17.7)
HGB UR QL STRIP: ABNORMAL
IMM GRANULOCYTES # BLD: 0.1 X10E3/UL (ref 0–0.1)
IMM GRANULOCYTES NFR BLD: 1 %
INTERPRETATION, 910389: NORMAL
KETONES UR QL STRIP: NEGATIVE
LDLC SERPL CALC-MCNC: 76 MG/DL (ref 0–99)
LEUKOCYTE ESTERASE UR QL STRIP: ABNORMAL
LYMPHOCYTES # BLD AUTO: 1.3 X10E3/UL (ref 0.7–3.1)
LYMPHOCYTES NFR BLD AUTO: 18 %
MCH RBC QN AUTO: 34 PG (ref 26.6–33)
MCHC RBC AUTO-ENTMCNC: 36.7 G/DL (ref 31.5–35.7)
MCV RBC AUTO: 93 FL (ref 79–97)
MICRO URNS: ABNORMAL
MONOCYTES # BLD AUTO: 0.6 X10E3/UL (ref 0.1–0.9)
MONOCYTES NFR BLD AUTO: 9 %
MORPHOLOGY BLD-IMP: ABNORMAL
MUCOUS THREADS URNS QL MICRO: PRESENT
NEUTROPHILS # BLD AUTO: 4.7 X10E3/UL (ref 1.4–7)
NEUTROPHILS NFR BLD AUTO: 68 %
NITRITE UR QL STRIP: NEGATIVE
PH UR STRIP: 6.5 [PH] (ref 5–7.5)
PLATELET # BLD AUTO: ABNORMAL X10E3/UL
POTASSIUM SERPL-SCNC: 4.7 MMOL/L (ref 3.5–5.2)
PROT SERPL-MCNC: 6.6 G/DL (ref 6–8.5)
PROT UR QL STRIP: ABNORMAL
RBC # BLD AUTO: 3.73 X10E6/UL (ref 4.14–5.8)
RBC #/AREA URNS HPF: >30 /HPF
SODIUM SERPL-SCNC: 139 MMOL/L (ref 134–144)
SP GR UR: 1.01 (ref 1–1.03)
TRIGL SERPL-MCNC: 71 MG/DL (ref 0–149)
UROBILINOGEN UR STRIP-MCNC: 0.2 MG/DL (ref 0.2–1)
VLDLC SERPL CALC-MCNC: 14 MG/DL (ref 5–40)
WBC # BLD AUTO: 7 X10E3/UL (ref 3.4–10.8)
WBC #/AREA URNS HPF: ABNORMAL /HPF

## 2018-06-08 NOTE — PROGRESS NOTES
Outbound call to patient. No answer. Left message for patient to return call to office regarding labs.

## 2018-06-08 NOTE — PROGRESS NOTES
Please inform patient,  Average sugar persistently excellent 5.3. Also cholesterol results normal  CBC shows anemia and urine positive for large amount of blood, from his bladder cancer. He follows up with urology.   Kidney and liver function normal  thanks

## 2018-06-12 RX ORDER — INSULIN HUMAN 100 [IU]/ML
INJECTION, SUSPENSION SUBCUTANEOUS
Qty: 10 ML | Refills: 0 | Status: SHIPPED | OUTPATIENT
Start: 2018-06-12 | End: 2018-07-18 | Stop reason: SDUPTHER

## 2018-06-12 NOTE — PROGRESS NOTES
Unable to reach patient via phone. Second attempt. No answer left message. Letter printed with results.

## 2018-06-16 DIAGNOSIS — M16.11 ARTHRITIS OF RIGHT HIP: ICD-10-CM

## 2018-06-16 DIAGNOSIS — I35.0 NONRHEUMATIC AORTIC VALVE STENOSIS: ICD-10-CM

## 2018-06-16 DIAGNOSIS — R60.9 EDEMA, UNSPECIFIED TYPE: ICD-10-CM

## 2018-06-17 RX ORDER — POTASSIUM CHLORIDE 20 MEQ/1
TABLET, EXTENDED RELEASE ORAL
Qty: 90 TAB | Refills: 0 | Status: SHIPPED | OUTPATIENT
Start: 2018-06-17 | End: 2018-09-14 | Stop reason: SDUPTHER

## 2018-06-17 RX ORDER — FUROSEMIDE 40 MG/1
TABLET ORAL
Qty: 30 TAB | Refills: 0 | Status: SHIPPED | OUTPATIENT
Start: 2018-06-17 | End: 2018-07-13 | Stop reason: SDUPTHER

## 2018-06-17 RX ORDER — MELOXICAM 15 MG/1
TABLET ORAL
Qty: 30 TAB | Refills: 0 | Status: SHIPPED | OUTPATIENT
Start: 2018-06-17 | End: 2018-07-13 | Stop reason: SDUPTHER

## 2018-06-17 RX ORDER — TAMSULOSIN HYDROCHLORIDE 0.4 MG/1
CAPSULE ORAL
Qty: 90 CAP | Refills: 0 | Status: SHIPPED | OUTPATIENT
Start: 2018-06-17 | End: 2018-09-14 | Stop reason: SDUPTHER

## 2018-07-13 DIAGNOSIS — I35.0 NONRHEUMATIC AORTIC VALVE STENOSIS: ICD-10-CM

## 2018-07-13 DIAGNOSIS — M16.11 ARTHRITIS OF RIGHT HIP: ICD-10-CM

## 2018-07-13 DIAGNOSIS — R60.9 EDEMA, UNSPECIFIED TYPE: ICD-10-CM

## 2018-07-14 RX ORDER — FUROSEMIDE 40 MG/1
TABLET ORAL
Qty: 30 TAB | Refills: 0 | Status: SHIPPED | OUTPATIENT
Start: 2018-07-14 | End: 2018-08-14 | Stop reason: SDUPTHER

## 2018-07-14 RX ORDER — MELOXICAM 15 MG/1
TABLET ORAL
Qty: 30 TAB | Refills: 0 | Status: SHIPPED | OUTPATIENT
Start: 2018-07-14 | End: 2018-08-14 | Stop reason: SDUPTHER

## 2018-07-18 RX ORDER — INSULIN HUMAN 100 [IU]/ML
INJECTION, SUSPENSION SUBCUTANEOUS
Qty: 10 ML | Refills: 0 | Status: SHIPPED | OUTPATIENT
Start: 2018-07-18 | End: 2018-08-28 | Stop reason: SDUPTHER

## 2018-08-14 DIAGNOSIS — M16.11 ARTHRITIS OF RIGHT HIP: ICD-10-CM

## 2018-08-14 DIAGNOSIS — Z79.4 CONTROLLED TYPE 2 DIABETES MELLITUS WITH DIABETIC AUTONOMIC NEUROPATHY, WITH LONG-TERM CURRENT USE OF INSULIN (HCC): ICD-10-CM

## 2018-08-14 DIAGNOSIS — I35.0 NONRHEUMATIC AORTIC VALVE STENOSIS: ICD-10-CM

## 2018-08-14 DIAGNOSIS — E11.43 CONTROLLED TYPE 2 DIABETES MELLITUS WITH DIABETIC AUTONOMIC NEUROPATHY, WITH LONG-TERM CURRENT USE OF INSULIN (HCC): ICD-10-CM

## 2018-08-14 DIAGNOSIS — R60.9 EDEMA, UNSPECIFIED TYPE: ICD-10-CM

## 2018-08-14 RX ORDER — FUROSEMIDE 40 MG/1
TABLET ORAL
Qty: 30 TAB | Refills: 0 | Status: SHIPPED | OUTPATIENT
Start: 2018-08-14 | End: 2018-09-14 | Stop reason: SDUPTHER

## 2018-08-14 RX ORDER — MELOXICAM 15 MG/1
TABLET ORAL
Qty: 30 TAB | Refills: 0 | Status: SHIPPED | OUTPATIENT
Start: 2018-08-14 | End: 2018-09-17 | Stop reason: SDUPTHER

## 2018-08-14 RX ORDER — METFORMIN HYDROCHLORIDE 500 MG/1
TABLET, EXTENDED RELEASE ORAL
Qty: 30 TAB | Refills: 0 | Status: SHIPPED | OUTPATIENT
Start: 2018-08-14 | End: 2018-09-14 | Stop reason: SDUPTHER

## 2018-08-28 RX ORDER — INSULIN HUMAN 100 [IU]/ML
INJECTION, SUSPENSION SUBCUTANEOUS
Qty: 10 ML | Refills: 0 | Status: SHIPPED | OUTPATIENT
Start: 2018-08-28 | End: 2018-10-05 | Stop reason: SDUPTHER

## 2018-09-14 DIAGNOSIS — I35.0 NONRHEUMATIC AORTIC VALVE STENOSIS: ICD-10-CM

## 2018-09-14 DIAGNOSIS — R60.9 EDEMA, UNSPECIFIED TYPE: ICD-10-CM

## 2018-09-14 DIAGNOSIS — E11.43 CONTROLLED TYPE 2 DIABETES MELLITUS WITH DIABETIC AUTONOMIC NEUROPATHY, WITH LONG-TERM CURRENT USE OF INSULIN (HCC): ICD-10-CM

## 2018-09-14 DIAGNOSIS — Z79.4 CONTROLLED TYPE 2 DIABETES MELLITUS WITH DIABETIC AUTONOMIC NEUROPATHY, WITH LONG-TERM CURRENT USE OF INSULIN (HCC): ICD-10-CM

## 2018-09-14 RX ORDER — TAMSULOSIN HYDROCHLORIDE 0.4 MG/1
CAPSULE ORAL
Qty: 90 CAP | Refills: 0 | Status: SHIPPED | COMMUNITY
Start: 2018-09-14 | End: 2018-12-19 | Stop reason: SDUPTHER

## 2018-09-14 RX ORDER — METFORMIN HYDROCHLORIDE 500 MG/1
TABLET, EXTENDED RELEASE ORAL
Qty: 30 TAB | Refills: 0 | Status: SHIPPED | COMMUNITY
Start: 2018-09-14 | End: 2019-03-12 | Stop reason: SDUPTHER

## 2018-09-14 RX ORDER — POTASSIUM CHLORIDE 20 MEQ/1
TABLET, EXTENDED RELEASE ORAL
Qty: 90 TAB | Refills: 0 | Status: SHIPPED | COMMUNITY
Start: 2018-09-14 | End: 2018-12-19 | Stop reason: SDUPTHER

## 2018-09-14 RX ORDER — NAPROXEN SODIUM 220 MG
TABLET ORAL
Qty: 60 SYRINGE | Refills: 2 | Status: SHIPPED | COMMUNITY
Start: 2018-09-14 | End: 2018-12-15 | Stop reason: SDUPTHER

## 2018-09-14 RX ORDER — FUROSEMIDE 40 MG/1
TABLET ORAL
Qty: 30 TAB | Refills: 0 | Status: SHIPPED | COMMUNITY
Start: 2018-09-14 | End: 2018-10-16 | Stop reason: SDUPTHER

## 2018-09-17 DIAGNOSIS — M16.11 ARTHRITIS OF RIGHT HIP: ICD-10-CM

## 2018-09-18 RX ORDER — MELOXICAM 15 MG/1
TABLET ORAL
Qty: 30 TAB | Refills: 0 | Status: SHIPPED | OUTPATIENT
Start: 2018-09-18 | End: 2018-10-16 | Stop reason: SDUPTHER

## 2018-10-05 RX ORDER — INSULIN HUMAN 100 [IU]/ML
INJECTION, SUSPENSION SUBCUTANEOUS
Qty: 10 ML | Refills: 0 | Status: SHIPPED | COMMUNITY
Start: 2018-10-05 | End: 2018-11-19 | Stop reason: SDUPTHER

## 2018-10-16 DIAGNOSIS — M16.11 ARTHRITIS OF RIGHT HIP: ICD-10-CM

## 2018-10-16 DIAGNOSIS — I35.0 NONRHEUMATIC AORTIC VALVE STENOSIS: ICD-10-CM

## 2018-10-16 DIAGNOSIS — R60.9 EDEMA, UNSPECIFIED TYPE: ICD-10-CM

## 2018-10-16 RX ORDER — FUROSEMIDE 40 MG/1
TABLET ORAL
Qty: 30 TAB | Refills: 0 | Status: SHIPPED | OUTPATIENT
Start: 2018-10-16 | End: 2018-11-15 | Stop reason: SDUPTHER

## 2018-10-16 RX ORDER — MELOXICAM 15 MG/1
TABLET ORAL
Qty: 30 TAB | Refills: 0 | Status: SHIPPED | OUTPATIENT
Start: 2018-10-16 | End: 2018-11-15 | Stop reason: SDUPTHER

## 2018-11-08 ENCOUNTER — OFFICE VISIT (OUTPATIENT)
Dept: FAMILY MEDICINE CLINIC | Age: 78
End: 2018-11-08

## 2018-11-08 VITALS
WEIGHT: 202.8 LBS | RESPIRATION RATE: 16 BRPM | DIASTOLIC BLOOD PRESSURE: 64 MMHG | HEART RATE: 67 BPM | BODY MASS INDEX: 28.39 KG/M2 | OXYGEN SATURATION: 94 % | HEIGHT: 71 IN | SYSTOLIC BLOOD PRESSURE: 108 MMHG | TEMPERATURE: 97.6 F

## 2018-11-08 DIAGNOSIS — E66.3 OVER WEIGHT: ICD-10-CM

## 2018-11-08 DIAGNOSIS — M16.11 ARTHRITIS OF RIGHT HIP: ICD-10-CM

## 2018-11-08 DIAGNOSIS — Z00.00 MEDICARE ANNUAL WELLNESS VISIT, SUBSEQUENT: Primary | ICD-10-CM

## 2018-11-08 DIAGNOSIS — C67.9 MALIGNANT NEOPLASM OF URINARY BLADDER, UNSPECIFIED SITE (HCC): ICD-10-CM

## 2018-11-08 DIAGNOSIS — M54.50 CHRONIC MIDLINE LOW BACK PAIN WITHOUT SCIATICA: ICD-10-CM

## 2018-11-08 DIAGNOSIS — Z13.39 SCREENING FOR ALCOHOLISM: ICD-10-CM

## 2018-11-08 DIAGNOSIS — J43.9 PULMONARY EMPHYSEMA, UNSPECIFIED EMPHYSEMA TYPE (HCC): ICD-10-CM

## 2018-11-08 DIAGNOSIS — Z13.31 SCREENING FOR DEPRESSION: ICD-10-CM

## 2018-11-08 DIAGNOSIS — Z71.89 ADVANCED DIRECTIVES, COUNSELING/DISCUSSION: ICD-10-CM

## 2018-11-08 DIAGNOSIS — G89.29 CHRONIC MIDLINE LOW BACK PAIN WITHOUT SCIATICA: ICD-10-CM

## 2018-11-08 DIAGNOSIS — J44.9 CHRONIC OBSTRUCTIVE PULMONARY DISEASE, UNSPECIFIED COPD TYPE (HCC): ICD-10-CM

## 2018-11-08 DIAGNOSIS — I35.0 NONRHEUMATIC AORTIC VALVE STENOSIS: ICD-10-CM

## 2018-11-08 DIAGNOSIS — E11.43 CONTROLLED TYPE 2 DIABETES MELLITUS WITH DIABETIC AUTONOMIC NEUROPATHY, WITH LONG-TERM CURRENT USE OF INSULIN (HCC): ICD-10-CM

## 2018-11-08 DIAGNOSIS — D69.6 THROMBOCYTOPENIA (HCC): ICD-10-CM

## 2018-11-08 DIAGNOSIS — Z79.4 CONTROLLED TYPE 2 DIABETES MELLITUS WITH DIABETIC AUTONOMIC NEUROPATHY, WITH LONG-TERM CURRENT USE OF INSULIN (HCC): ICD-10-CM

## 2018-11-08 NOTE — PROGRESS NOTES
HISTORY OF PRESENT ILLNESS Ezequiel Feldman is a 69 y.o. male. he was seen for follow up on DM, dyslipidemia, COPD,worsening hip and back pain. HPI Endocrine Review He is seen for diabetes. Since last visit he reports: no polyuria or polydipsia, no chest pain, dyspnea or TIA's, no unusual visual symptoms, no hypoglycemia, no medication side effects noted, has noticed numbness both feet. Home glucose monitoring: is performed regularly, fasting values range 140-200  He is checking his sugars 2 per day. He reports medication compliance: compliant all of the time. Medication side effects: none. Diabetic diet compliance: compliant all of the time. Lab review: labs reviewed and discussed with patient. Eye exam: overdue. Referral done His sliding scale Insulin was changed to NPH 12 units bid on last visit Has seen Dr Michael Alatorre in early January. Lab Results Component Value Date/Time Hemoglobin A1c 5.3 06/07/2018 09:06 AM  
 Hemoglobin A1c (POC) 5.4 06/28/2017 11:27 AM  
  
 
COPD Patient complains of cough, fatigue and shortness of breath. Symptoms began several years ago. Symptoms chronic dyspnea: severity = mild: course of sx: well controlled 
becomes dyspneic after 5 blocks 
symptoms worse with exertion. does worsen with exertion. Sputum is clear in small amounts. Patient currently is not on home oxygen therapy. Skip Bermeo Respiratory history: pneumonia, COPD and history of 10  pack years tobacco use Follows Dr Alexandra Felder. PFT not c/w obstructive disease. He was advised to continue on Dulera and prn Albuterol. in spite of counseling, he is still smoking He is now on Stioloto as per records. Only taking as needed. Hip Pain Patient complains of right hip pain. Onset of the symptoms was several years ago. Inciting event: none. Current symptoms include right sided hip pain. Severity = moderate to severe 
location: lateral, over greater trochanter, anterior radiates to: right inguinal region, right sided  upper leg(s): anterior, lateral 
worse with weight bearing. Associated symptoms: none. Aggravating symptoms: rising after sitting, any weight bearing. Patient's overall course: gradually worsening and course of pain: gradually worsening. Patient has had prior hip problems. Previous visits for this problem: none. Evaluation to date: none. Treatment to date: Meloxicam. 
 
He is concerned about lower back pain also. Pain worst with movement . Pain radiating down to right hip, right thigh and leg. He feels imbalanced sometimes Review of Systems Constitutional: Negative for chills, fever and malaise/fatigue. HENT: Negative for congestion, ear pain, sore throat and tinnitus. Eyes: Negative for blurred vision, double vision, pain and discharge. Respiratory: Negative for cough, shortness of breath and wheezing. Cardiovascular: Negative for chest pain, palpitations and leg swelling. Gastrointestinal: Negative for abdominal pain, blood in stool, constipation, diarrhea, nausea and vomiting. Genitourinary: Negative for dysuria, frequency, hematuria and urgency. Musculoskeletal: Positive for back pain. Negative for joint pain and myalgias. Right hip pain Skin: Negative for rash. Neurological: Negative for dizziness, tremors, seizures and headaches. Endo/Heme/Allergies: Negative for polydipsia. Does not bruise/bleed easily. Psychiatric/Behavioral: Negative for depression and substance abuse. The patient is not nervous/anxious. Physical Exam  
Constitutional: He is oriented to person, place, and time. He appears well-developed and well-nourished. HENT:  
Head: Normocephalic and atraumatic. Right Ear: External ear normal.  
Mouth/Throat: Oropharynx is clear and moist. No oropharyngeal exudate. Eyes: Conjunctivae and EOM are normal. Pupils are equal, round, and reactive to light. No scleral icterus. Neck: Normal range of motion. Neck supple. No JVD present. No thyromegaly present. Cardiovascular: Normal rate, regular rhythm, normal heart sounds and intact distal pulses. No murmur heard. Pulmonary/Chest: Effort normal and breath sounds normal. He has no wheezes. Abdominal: Soft. Bowel sounds are normal. He exhibits no distension and no mass. Musculoskeletal: He exhibits no edema. Right hip: He exhibits decreased range of motion and tenderness. Back: 
 
Feet:warm, good capillary refill, no trophic changes or ulcerative lesions, normal DP and PT pulses and reduced sensation at both feet 
 
right Hip Tenderness: Greater trochanter, Lateral and Anterior Positive ADAMS and FADIR Lymphadenopathy:  
  He has no cervical adenopathy. Neurological: He is alert and oriented to person, place, and time. He has normal reflexes. No cranial nerve deficit. Skin: Skin is warm and dry. No rash noted. He is not diaphoretic. Psychiatric: He has a normal mood and affect. Nursing note and vitals reviewed. ASSESSMENT and PLAN Diagnoses and all orders for this visit: 
 
1. Medicare annual wellness visit, subsequent 
-     TSH REFLEX TO T4 
 
2. 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 
-     HEMOGLOBIN A1C WITH EAG 
-     MICROALBUMIN, UR, RAND W/ MICROALB/CREAT RATIO 
-     LIPID PANEL 3. Arthritis of right hip 
-     XR HIP RT W OR WO PELV 2-3 VWS; Future Xray showing significant arthritis. Recommended to see specialist for possible joint injection vs surgery 4. Pulmonary emphysema, unspecified emphysema type ([address] 
-     CBC WITH AUTOMATED DIFF 5. Chronic obstructive pulmonary disease, unspecified COPD type ([address] 
-     CBC WITH AUTOMATED DIFF 6. Nonrheumatic aortic valve stenosis -     LIPID PANEL 7. Chronic midline low back pain without sciatica -     XR SPINE LUMB 2 OR 3 V; Future 
-     REFERRAL TO ORTHOPEDICS Xray showing significant arthritis. Referred to spine specialist 
 
8. Thrombocytopenia (HCC) 
-     CBC WITH AUTOMATED DIFF 9. Malignant neoplasm of urinary bladder, unspecified site (HCC) 
-     URINALYSIS W/ RFLX MICROSCOPIC 10. Advanced directives, counseling/discussion -     ADVANCE CARE PLANNING FIRST 30 MINS 11. Screening for alcoholism -     [place] ANNUAL ALCOHOL SCREEN 15 MIN 12. Screening for depression 
-     [place] [id number] 13. Over weight Discussed abnormal weight, ideal BMI and importance of diet and exercise to loose weight. Referral for exercise program was offered. Printed information about diet and lifestyle modification provided. Other orders 
-     varicella-zoster recombinant, PF, (SHINGRIX, PF,) 50 mcg/0.5 mL susr injection; 0.5mL by IntraMUSCular route once now and then repeat in 2-6 months Discussed lifestyle issues and health guidance given Patient was given an after visit summary which includes diagnoses, vital signs, current medications, instructions and references & authorized prescriptions . Results of labs will be conveyed to patient, once available. Pt verbalized instructions I provided and expressed understanding of discussion that was held today. Follow-up Disposition: 
Return in about 4 months (around 3/8/2019) for fasting, follow up, DM.

## 2018-11-08 NOTE — PATIENT INSTRUCTIONS
Medicare Wellness Visit, Male The best way to live healthy is to have a lifestyle where you eat a well-balanced diet, exercise regularly, limit alcohol use, and quit all forms of tobacco/nicotine, if applicable. Regular preventive services are another way to keep healthy. Preventive services (vaccines, screening tests, monitoring & exams) can help personalize your care plan, which helps you manage your own care. Screening tests can find health problems at the earliest stages, when they are easiest to treat. [address] follows the current, evidence-based guidelines published by the [facility] Marck Santana ([facility]) when recommending preventive services for our patients. Because we follow these guidelines, sometimes recommendations change over time as research supports it. (For example, a prostate screening blood test is no longer routinely recommended for men with no symptoms.) Of course, you and your doctor may decide to screen more often for some diseases, based on your risk and co-morbidities (chronic disease you are already diagnosed with). Preventive services for you include: - Medicare offers their members a free annual wellness visit, which is time for you and your primary care provider to discuss and plan for your preventive service needs. Take advantage of this benefit every year! 
-All adults over age 72 should receive the recommended pneumonia vaccines. Current USPSTF guidelines recommend a series of two vaccines for the best pneumonia protection.  
-All adults should have a flu vaccine yearly and an ECG.  All adults age 61 and older should receive a shingles vaccine once in their lifetime.   
-All adults age 38-68 who are overweight should have a diabetes screening test once every three years.  
-Other screening tests & preventive services for persons with diabetes include: an eye exam to screen for diabetic retinopathy, a kidney function test, a foot exam, and stricter control over your cholesterol.  
-Cardiovascular screening for adults with routine risk involves an electrocardiogram (ECG) at intervals determined by the provider.  
-Colorectal cancer screening should be done for adults age 54-65 with no increased risk factors for colorectal cancer. There are a number of acceptable methods of screening for this type of cancer. Each test has its own benefits and drawbacks. Discuss with your provider what is most appropriate for you during your annual wellness visit. The different tests include: colonoscopy (considered the best screening method), a fecal occult blood test, a fecal DNA test, and sigmoidoscopy. 
-All adults born between [facility] should be screened once for Hepatitis C. 
-An Abdominal Aortic Aneurysm (AAA) Screening is recommended for men age 73-68 who has ever smoked in their lifetime. Here is a list of your current Health Maintenance items (your personalized list of preventive services) with a due date: 
Health Maintenance Due Topic Date Due  Shingles Vaccine (1 of 2) 07/10/1991 [facility] Annual Well Visit  06/30/2018  Albumin Urine Test  10/03/2018  Hemoglobin A1C    12/07/2018 Kidney Disease and Diabetes: Care Instructions Your Care Instructions When you have diabetes, your body cannot make enough insulin or use it the way it should. Your body needs insulin to help sugar move from the blood to the cells. Without it, your blood sugar gets too high. High blood sugar damages your kidneys and makes it hard for them to filter blood. This causes fluid and waste to build up in your blood. If you have diabetes, it is very important to keep your blood sugar in your target range. There are many steps you can take to do this. If you can control your blood sugar, you will have the best chance to slow or stop damage to your kidneys. Follow-up care is a key part of your treatment and safety.  Be sure to make and go to all appointments, and call your doctor if you are having problems. It's also a good idea to know your test results and keep a list of the medicines you take. How can you care for yourself at home? To control your diabetes and slow or stop damage to your kidneys · Keep your blood sugar in your target range. The American Diabetes Association recommends a hemoglobin A1c (Hb A1c) target level of less than 7%. Talk to your doctor about your target. The lower your A1c, the better your chance of stopping kidney damage. · Keep your blood pressure in your target range. Doctors recommend specific types of blood pressure medicines for people who have diabetes and kidney disease. Examples include ACE inhibitors and angiotensin II receptor blockers (ARBs). Your doctor may have you take one of these even if you don't have high blood pressure. · Take all of your medicines. You may have several. For example, you may take medicines for diabetes, cholesterol, and blood pressure. It's very important to take all of them just as your doctor tells you to and to keep taking them. · Make good food choices. Follow an eating plan that is best for your diabetes and your kidneys. You may want to work with a dietitian to make a plan. A good plan will make sure that you spread carbohydrate throughout the day. It will also make sure that you get the right amount of salt (sodium), fluids, and protein. · Stay at a healthy weight. If you need help to lose weight, talk to your doctor or dietitian. Even small changes can make a difference. Try to be aware of your portion sizes, eat more fruits and vegetables, and add some activity to your daily routine. · Exercise. Get at least 30 minutes of activity on most days of the week. Walking is a great exercise that most people can do. Being more active can help you control your blood sugar and stay at a healthy weight. It also can help you lower cholesterol and blood pressure. To improve your kidney health · Lower your cholesterol. Keep your LDL less than 100 mg/dL and HDL levels more than 40 mg/dL for men and 50 mg/dL for women. If you have high cholesterol, your doctor may prescribe medicine. He or she may also tell you to eat less saturated fat. · Follow your treatment plan. Check your blood sugar as many times a day as your doctor recommends. Go to all of your follow-up appointments, and be sure to have all the tests your doctor orders. Call your doctor if you think you are having a problem with your medicines. · Take a low-dose aspirin every day if your doctor suggests it. Most doctors believe this can reduce the risk of heart disease and stroke. Your risk of these diseases is much greater than your risk of kidney failure. · Avoid tobacco. Do not smoke or use other tobacco products. If you need help quitting, talk to your doctor about stop-smoking programs and medicines. These can increase your chances of quitting for good. When should you call for help? Call 911 anytime you think you may need emergency care. For example, call if: 
  · You passed out (lost consciousness).  
 Call your doctor now or seek immediate medical care if: 
  · You have new or worse nausea and vomiting.  
  · You have much less urine than normal, or you have no urine.  
  · You are feeling confused or cannot think clearly.  
  · You have new or more blood in your urine.  
  · You have new swelling.  
  · You are dizzy or lightheaded, or you feel like you may faint.  
 Watch closely for changes in your health, and be sure to contact your doctor if: 
  · You do not get better as expected. Where can you learn more? Go to http://meg-zamzam.info/. Enter C726 in the search box to learn more about \"Kidney Disease and Diabetes: Care Instructions. \" Current as of: March 15, 2018 Content Version: 11.8 © 1233-5294 [employer].  Care instructions adapted under license by [address] (which disclaims liability or warranty for this information). If you have questions about a medical condition or this instruction, always ask your healthcare professional. [address] any warranty or liability for your use of this information. Benedict Gray 6968 What is a living will? A living will is a legal form you use to write down the kind of care you want at the end of your life. It is used by the health professionals who will treat you if you aren't able to decide for yourself. If you put your wishes in writing, your loved ones and others will know what kind of care you want. They won't need to guess. This can ease your mind and be helpful to others. A living will is not the same as an estate or property will. An estate will explains what you want to happen with your money and property after you die. Is a living will a legal document? A living will is a legal document. Each state has its own laws about living mckeon. If you move to another state, make sure that your living will is legal in the state where you now live. Or you might use a universal form that has been approved by many states. This kind of form can sometimes be completed and stored online. Your electronic copy will then be available wherever you have a connection to the Internet. In most cases, doctors will respect your wishes even if you have a form from a different state. · You don't need an  to complete a living will. But legal advice can be helpful if your state's laws are unclear, your health history is complicated, or your family can't agree on what should be in your living will. · You can change your living will at any time. Some people find that their wishes about end-of-life care change as their health changes.  
· In addition to making a living will, think about completing a medical power of  form. This form lets you name the person you want to make end-of-life treatment decisions for you (your \"health care agent\") if you're not able to. Many hospitals and nursing homes will give you the forms you need to complete a living will and a medical power of . · Your living will is used only if you can't make or communicate decisions for yourself anymore. If you become able to make decisions again, you can accept or refuse any treatment, no matter what you wrote in your living will. · Your state may offer an online registry. This is a place where you can store your living will online so the doctors and nurses who need to treat you can find it right away. What should you think about when creating a living will? Talk about your end-of-life wishes with your family members and your doctor. Let them know what you want. That way the people making decisions for you won't be surprised by your choices. Think about these questions as you make your living will: · Do you know enough about life support methods that might be used? If not, talk to your doctor so you know what might be done if you can't breathe on your own, your heart stops, or you're unable to swallow. · What things would you still want to be able to do after you receive life-support methods? Would you want to be able to walk? To speak? To eat on your own? To live without the help of machines? · If you have a choice, where do you want to be cared for? In your home? At a hospital or nursing home? · Do you want certain Zoroastrian practices performed if you become very ill? · If you have a choice at the end of your life, where would you prefer to die? At home? In a hospital or nursing home? Somewhere else? · Would you prefer to be buried or cremated? · Do you want your organs to be donated after you die? What should you do with your living will?  
· Make sure that your family members and your health care agent have copies of your living will. · Give your doctor a copy of your living will to keep in your medical record. If you have more than one doctor, make sure that each one has a copy. · You may want to put a copy of your living will where it can be easily found. Where can you learn more? Go to http://meg-zamzam.info/. Enter L798 in the search box to learn more about \"Learning About Living Emmy Mchugh. \" Current as of: August 8, 2016 Content Version: 11.3 © 2749-2747 [employer]. Care instructions adapted under license by [employer] (which disclaims liability or warranty for this information). If you have questions about a medical condition or this instruction, always ask your healthcare professional. [address] any warranty or liability for your use of this information.

## 2018-11-08 NOTE — PROGRESS NOTES
Chief Complaint Patient presents with  Diabetes  
  follow up  fasting  COPD  Tailbone Pain  
  hip as well in right side. 1. Have you been to the ER, urgent care clinic since your last visit? Hospitalized since your last visit? No 
 
2. Have you seen or consulted any other health care providers outside of the [address] [place] [address] since your last visit? Include any pap smears or colon screening. Yes When: Dr. Abe Fontaine oncology x 2 weeks ago Patient presents in office for 4 month follow up for DM, COPD. Patient also c/o right sided lower back/hip pain. He feels Rx is ineffective. Patient also states that when he goes outside at night he is unsteady. If there is not anything for him to hold on to he will fall.

## 2018-11-08 NOTE — PROGRESS NOTES
This is the Subsequent Medicare Annual Wellness Exam, performed 12 months or more after the Initial AWV or the last Subsequent AWV I have reviewed the patient's medical history in detail and updated the computerized patient record. History Past Medical History:  
Diagnosis Date  COPD (chronic obstructive pulmonary disease) (HCC)  Diabetes ([address]  Enlarged prostate  Moderate aortic stenosis   
 echo June 2016 [place]   
 Pneumonia Past Surgical History:  
Procedure Laterality Date  ABDOMEN SURGERY PROC UNLISTED    
 hernia repair  HX GI    
 cholecystectomy  HX ORTHOPAEDIC    
 screws in left ankle  HX RETINAL DETACHMENT REPAIR    
 HX UROLOGICAL    
 bladder surgery Current Outpatient Medications Medication Sig Dispense Refill  varicella-zoster recombinant, PF, (SHINGRIX, PF,) 50 mcg/0.5 mL susr injection 0.5mL by IntraMUSCular route once now and then repeat in 2-6 months 0.5 mL 1  
 furosemide (LASIX) 40 mg tablet TAKE 1 TABLET BY MOUTH DAILY 30 Tab 0  
 meloxicam (MOBIC) 15 mg tablet TAKE 1 TABLET BY MOUTH DAILY AFTER BREAKFAST 30 Tab 0  
 HUMULIN 70/30 U-100 INSULIN 100 unit/mL (70-30) injection INJECT 10 UNITS UNDER THE SKIN BEFORE BREAKFAST AND DINNER. DISCARD AFTER 28 DAYS FROM FIRST USE 10 mL 0  
 metFORMIN ER (GLUCOPHAGE XR) 500 mg tablet TAKE 1 TABLET BY MOUTH DAILY WITH DINNER 30 Tab 0  
 potassium chloride (K-DUR, KLOR-CON) 20 mEq tablet TAKE 1 TABLET BY MOUTH EVERY DAY 90 Tab 0  
 tamsulosin (FLOMAX) 0.4 mg capsule TAKE 1 CAPSULE BY MOUTH DAILY 90 Cap 0  
 STIOLTO RESPIMAT 2.5-2.5 mcg/actuation mist INHALE 2 PUFFS PO QD  5  
 gabapentin (NEURONTIN) 300 mg capsule Take 1 Cap by mouth nightly. 90 Cap 1  
 mometasone-formoterol (DULERA) 100-5 mcg/actuation HFA inhaler Take 2 Puffs by inhalation two (2) times a day. 1 Inhaler 0  
 ONETOUCH ULTRA TEST strip USE TO TEST TWICE DAILY 100 Strip 0  
 PROAIR HFA 90 mcg/actuation inhaler  VESICARE 10 mg tablet TK 1 T PO QD  0  
 MULTIVIT-MIN/FA/LYCOPEN/LUTEIN (CENTRUM SILVER ULTRA MEN'S PO) Take  by mouth.  ONETOUCH ULTRA2 monitoring kit   0 No Known Allergies Family History Problem Relation Age of Onset  Diabetes Mother  Diabetes Father   
     had angina  Diabetes Paternal Grandmother Social History Tobacco Use  Smoking status: Current Every Day Smoker Packs/day: 0.50  Smokeless tobacco: Never Used  Tobacco comment: 1 pack a day  ( 16-18 ) a day Substance Use Topics  Alcohol use: Yes Alcohol/week: 8.4 oz Types: 14 Cans of beer per week Comment: Social   
 
Patient Active Problem List  
Diagnosis Code  Edema R60.9  Controlled type 2 diabetes mellitus with diabetic autonomic neuropathy (HCC) E11.43  
 Pulmonary emphysema (HCC) J43.9  Benign prostatic hyperplasia N40.0  Thrombocytopenia ([address] D69.6  Tobacco abuse Z72.0  Nonrheumatic aortic valve stenosis I35.0  Arthritis of right hip M16.11  
 Malignant neoplasm of anterior wall of urinary bladder (HCC) C67.3  Benign essential HTN I10  
 Chronic obstructive pulmonary disease (HCC) J44.9  Type 2 diabetes mellitus with diabetic neuropathy (HCC) E11.40 Depression Risk Factor Screening: PHQ over the last two weeks 11/8/2018 Little interest or pleasure in doing things Not at all Feeling down, depressed, irritable, or hopeless Not at all Total Score PHQ 2 0 Alcohol Risk Factor Screening: On any occasion in the past three months you have had more than 7 drinks containing alcohol no Drinks 2-3 beers/ week Functional Ability and Level of Safety:  
Hearing Loss The patient needs further evaluation. Activities of Daily Living The home contains: no safety equipment. Patient does total self care Fall Risk Fall Risk Assessment, last 12 mths 11/8/2018 Able to walk? Yes Fall in past 12 months? No  
Fall with injury?  -  
 Number of falls in past 12 months - Fall Risk Score -  
 
 
Abuse Screen Patient is not abused Cognitive Screening Evaluation of Cognitive Function: 
Has your family/caregiver stated any concerns about your memory: no 
Normal, MMSE Patient Care Team  
Patient Care Team: 
Faizan Barrera MD as PCP - [facility]) Ketan Bush MD (Hematology and Oncology) Chari Lee MD (Internal Medicine) Dashawn Callahan MD (Urology) Nirali Bazzi MD (Cardiology) Assessment/Plan Education and counseling provided: 
Are appropriate based on today's review and evaluation Diagnoses and all orders for this visit: 
 
1. Medicare annual wellness visit, subsequent 
-     TSH REFLEX TO T4 
 
2. Controlled type 2 diabetes mellitus with diabetic autonomic neuropathy, with long-term current use of insulin ([address] -     METABOLIC PANEL, COMPREHENSIVE 
-     HEMOGLOBIN A1C WITH EAG 
-     MICROALBUMIN, UR, RAND W/ MICROALB/CREAT RATIO 
-     LIPID PANEL 3. Arthritis of right hip 
-     XR HIP RT W OR WO PELV 2-3 VWS; Future 4. Pulmonary emphysema, unspecified emphysema type ([address] 
-     CBC WITH AUTOMATED DIFF 5. Chronic obstructive pulmonary disease, unspecified COPD type ([address] 
-     CBC WITH AUTOMATED DIFF 6. Nonrheumatic aortic valve stenosis -     LIPID PANEL 7. Chronic midline low back pain without sciatica -     XR SPINE LUMB 2 OR 3 V; Future 
-     REFERRAL TO ORTHOPEDICS 8. Thrombocytopenia (HCC) 
-     CBC WITH AUTOMATED DIFF 9. Malignant neoplasm of urinary bladder, unspecified site (HCC) 
-     URINALYSIS W/ RFLX MICROSCOPIC 10. Advanced directives, counseling/discussion -     ADVANCE CARE PLANNING FIRST 30 MINS 11. Screening for alcoholism -     MD ANNUAL ALCOHOL SCREEN 15 MIN 12. Screening for depression 
-     [facility]. Over weight Other orders 
-     varicella-zoster recombinant, PF, (SHINGRIX, PF,) 50 mcg/0.5 mL susr injection; 0.5mL by IntraMUSCular route once now and then repeat in 2-6 months Health Maintenance Due Topic Date Due  Shingrix Vaccine Age 50> (1 of 2) 07/10/1991  MICROALBUMIN Q1  10/03/2018  HEMOGLOBIN A1C Q6M  12/07/2018 Discussed lifestyle issues and health guidance given Patient was given an after visit summary which includes diagnoses, vital signs, current medications, instructions and references & authorized prescriptions . Results of labs will be conveyed to patient, once available. Pt verbalized instructions I provided and expressed understanding of discussion that was held today. Follow-up Disposition: 
Return in about 4 months (around 3/8/2019) for fasting, follow up, DM.

## 2018-11-08 NOTE — ACP (ADVANCE CARE PLANNING)
Advance Care Planning    Advance Care Planning (ACP) Provider Conversation Snapshot    Date of ACP Conversation: 11/08/18  Persons included in Conversation:  patient  Length of ACP Conversation in minutes:  16 minutes    Authorized Decision Maker (if patient is incapable of making informed decisions): sonJose ,   This person is: Other Legally Authorized Decision Maker (e.g. Next of Kin)          For Patients with Decision Making Capacity:   Values/Goals: Exploration of values, goals, and preferences if recovery is not expected, even with continued medical treatment in the event of:  Imminent death  Severe, permanent brain injury  \"In these circumstances, what matters most to you? \"  Care focused more on comfort or quality of life.     Conversation Outcomes / Follow-Up Plan:   Recommended completion of Advance Directive form after review of ACP materials and conversation with prospective healthcare agent   Recommended communicating the plan and making copies for the healthcare agent, personal physician, and others as appropriate (e.g., health system)  Recommended review of completed ACP document annually or upon change in health status  discussed importance of completing directive

## 2018-11-09 LAB
ALBUMIN SERPL-MCNC: 4.2 G/DL (ref 3.5–4.8)
ALBUMIN/CREAT UR: 56.5 MG/G CREAT (ref 0–30)
ALBUMIN/GLOB SERPL: 1.5 {RATIO} (ref 1.2–2.2)
ALP SERPL-CCNC: 75 IU/L (ref 39–117)
ALT SERPL-CCNC: 19 IU/L (ref 0–44)
APPEARANCE UR: CLEAR
AST SERPL-CCNC: 32 IU/L (ref 0–40)
BASOPHILS # BLD AUTO: 0 X10E3/UL (ref 0–0.2)
BASOPHILS NFR BLD AUTO: 1 %
BILIRUB SERPL-MCNC: 0.7 MG/DL (ref 0–1.2)
BILIRUB UR QL STRIP: NEGATIVE
BUN SERPL-MCNC: 16 MG/DL (ref 8–27)
BUN/CREAT SERPL: 16 (ref 10–24)
CALCIUM SERPL-MCNC: 9.5 MG/DL (ref 8.6–10.2)
CHLORIDE SERPL-SCNC: 102 MMOL/L (ref 96–106)
CHOLEST SERPL-MCNC: 145 MG/DL (ref 100–199)
CO2 SERPL-SCNC: 23 MMOL/L (ref 20–29)
COLOR UR: YELLOW
CREAT SERPL-MCNC: 1.02 MG/DL (ref 0.76–1.27)
CREAT UR-MCNC: 93.9 MG/DL
EOSINOPHIL # BLD AUTO: 0.3 X10E3/UL (ref 0–0.4)
EOSINOPHIL NFR BLD AUTO: 6 %
ERYTHROCYTE [DISTWIDTH] IN BLOOD BY AUTOMATED COUNT: 16 % (ref 12.3–15.4)
EST. AVERAGE GLUCOSE BLD GHB EST-MCNC: 105 MG/DL
GLOBULIN SER CALC-MCNC: 2.8 G/DL (ref 1.5–4.5)
GLUCOSE SERPL-MCNC: 113 MG/DL (ref 65–99)
GLUCOSE UR QL: NEGATIVE
HBA1C MFR BLD: 5.3 % (ref 4.8–5.6)
HCT VFR BLD AUTO: 34.8 % (ref 37.5–51)
HDLC SERPL-MCNC: 39 MG/DL
HGB BLD-MCNC: 12.5 G/DL (ref 13–17.7)
HGB UR QL STRIP: NEGATIVE
IMM GRANULOCYTES # BLD: 0 X10E3/UL (ref 0–0.1)
IMM GRANULOCYTES NFR BLD: 0 %
INTERPRETATION, 910389: NORMAL
KETONES UR QL STRIP: NEGATIVE
LDLC SERPL CALC-MCNC: 90 MG/DL (ref 0–99)
LEUKOCYTE ESTERASE UR QL STRIP: NEGATIVE
LYMPHOCYTES # BLD AUTO: 1.2 X10E3/UL (ref 0.7–3.1)
LYMPHOCYTES NFR BLD AUTO: 23 %
MCH RBC QN AUTO: 34.3 PG (ref 26.6–33)
MCHC RBC AUTO-ENTMCNC: 35.9 G/DL (ref 31.5–35.7)
MCV RBC AUTO: 96 FL (ref 79–97)
MICRO URNS: NORMAL
MICROALBUMIN UR-MCNC: 53.1 UG/ML
MONOCYTES # BLD AUTO: 0.5 X10E3/UL (ref 0.1–0.9)
MONOCYTES NFR BLD AUTO: 10 %
MORPHOLOGY BLD-IMP: ABNORMAL
NEUTROPHILS # BLD AUTO: 3.1 X10E3/UL (ref 1.4–7)
NEUTROPHILS NFR BLD AUTO: 60 %
NITRITE UR QL STRIP: NEGATIVE
PH UR STRIP: 6 [PH] (ref 5–7.5)
PLATELET # BLD AUTO: 90 X10E3/UL (ref 150–379)
POTASSIUM SERPL-SCNC: 5 MMOL/L (ref 3.5–5.2)
PROT SERPL-MCNC: 7 G/DL (ref 6–8.5)
PROT UR QL STRIP: NEGATIVE
RBC # BLD AUTO: 3.64 X10E6/UL (ref 4.14–5.8)
SODIUM SERPL-SCNC: 137 MMOL/L (ref 134–144)
SP GR UR: 1.01 (ref 1–1.03)
TRIGL SERPL-MCNC: 79 MG/DL (ref 0–149)
TSH SERPL DL<=0.005 MIU/L-ACNC: 2.52 UIU/ML (ref 0.45–4.5)
UROBILINOGEN UR STRIP-MCNC: 0.2 MG/DL (ref 0.2–1)
VLDLC SERPL CALC-MCNC: 16 MG/DL (ref 5–40)
WBC # BLD AUTO: 5.1 X10E3/UL (ref 3.4–10.8)

## 2018-11-09 NOTE — PROGRESS NOTES
Please inform patient CBC shows very low platelets. He has chronic thrombocytopenia, but now numbers more concerning. He followed with Ursula Zafar before. Needs urgent follow up. Please fax results to Dr Peng Boyce His hgb is also low from his bladder cancer and chronic hematuria All other results including kidney, liver, thyroid, cholesterol, normal 
A1C 5.3, very normal. He can reduce his Insulin to 10 units from 12 units  
thanks

## 2018-11-11 NOTE — PROGRESS NOTES
Urine negative for blood, and also urine protein has improved.  
He needs to follow up with hematology for anemia and thrombocytopenia

## 2018-11-12 NOTE — PROGRESS NOTES
Outbound call to patient. Name and  verified. Reviewed recent labs with patient. Advised of providers recommendation to decrease insulin and follow up with Dr. Ronan Meléndez. He verbalized understanding.

## 2018-11-15 DIAGNOSIS — R60.9 EDEMA, UNSPECIFIED TYPE: ICD-10-CM

## 2018-11-15 DIAGNOSIS — I35.0 NONRHEUMATIC AORTIC VALVE STENOSIS: ICD-10-CM

## 2018-11-15 DIAGNOSIS — M16.11 ARTHRITIS OF RIGHT HIP: ICD-10-CM

## 2018-11-15 RX ORDER — MELOXICAM 15 MG/1
TABLET ORAL
Qty: 30 TAB | Refills: 0 | Status: SHIPPED | OUTPATIENT
Start: 2018-11-15 | End: 2018-12-19 | Stop reason: SDUPTHER

## 2018-11-15 RX ORDER — FUROSEMIDE 40 MG/1
TABLET ORAL
Qty: 30 TAB | Refills: 0 | Status: SHIPPED | OUTPATIENT
Start: 2018-11-15 | End: 2018-12-19 | Stop reason: SDUPTHER

## 2018-11-19 RX ORDER — INSULIN HUMAN 100 [IU]/ML
INJECTION, SUSPENSION SUBCUTANEOUS
Qty: 10 ML | Refills: 0 | Status: SHIPPED | OUTPATIENT
Start: 2018-11-19 | End: 2019-01-05 | Stop reason: SDUPTHER

## 2018-11-27 ENCOUNTER — OFFICE VISIT (OUTPATIENT)
Dept: CARDIOLOGY CLINIC | Age: 78
End: 2018-11-27

## 2018-11-27 ENCOUNTER — CLINICAL SUPPORT (OUTPATIENT)
Dept: CARDIOLOGY CLINIC | Age: 78
End: 2018-11-27

## 2018-11-27 VITALS
DIASTOLIC BLOOD PRESSURE: 84 MMHG | HEART RATE: 66 BPM | WEIGHT: 200 LBS | SYSTOLIC BLOOD PRESSURE: 122 MMHG | BODY MASS INDEX: 27.89 KG/M2

## 2018-11-27 DIAGNOSIS — I10 BENIGN ESSENTIAL HTN: ICD-10-CM

## 2018-11-27 DIAGNOSIS — J44.9 CHRONIC OBSTRUCTIVE PULMONARY DISEASE, UNSPECIFIED COPD TYPE (HCC): ICD-10-CM

## 2018-11-27 DIAGNOSIS — I35.0 AORTIC VALVE STENOSIS, ETIOLOGY OF CARDIAC VALVE DISEASE UNSPECIFIED: Primary | ICD-10-CM

## 2018-11-27 DIAGNOSIS — I35.0 MODERATE AORTIC STENOSIS: Primary | ICD-10-CM

## 2018-11-27 DIAGNOSIS — Z72.0 TOBACCO ABUSE: ICD-10-CM

## 2018-11-27 PROBLEM — E11.21 TYPE 2 DIABETES WITH NEPHROPATHY (HCC): Status: ACTIVE | Noted: 2018-11-27

## 2018-11-27 NOTE — PROGRESS NOTES
[facility]: [facility]  [phone]    History of Present Illness:   Mr. Cristian Camp is 70 yo M with mild to moderate aortic stenosis, RBBB, COPD, HTN seen initially preop cardiac evaluation. Since his last visit, he continues to do okay. No exertional chest pain. His breathing has been stable, though he does have some baseline shortness of breath from his COPD. No significant palpitations, lightheadedness, dizziness or syncope. His echocardiogram today demonstrated continued preserved LV function with moderate aortic stenosis, mean gradient of 24 mmHg. This is slightly increased from 17 mmHg in 2016. He is compensated on exam with clear lungs, a II/VI murmur at the left upper sternal border. Assessment and Plan:   1. Moderate aortic stenosis. Asymptomatic with this. His gradient is slightly increased from last time, but within what would be expected. Would have him follow back in one year with a same day echocardiogram.    2. Essential hypertension. Blood pressure is controlled. 3. Tobacco use. Encouraged cessation. 4. COPD. He  has a past medical history of COPD (chronic obstructive pulmonary disease) ([address] Diabetes ([address] Enlarged prostate, Moderate aortic stenosis, and Pneumonia. All other systems negative except as above. PE  Vitals:    11/27/18 1334   BP: 122/84   Pulse: 66   Weight: 200 lb (90.7 kg)    Body mass index is 27.89 kg/m².    General appearance - alert, well appearing, and in no distress  Mental status - affect appropriate to mood  Eyes - sclera anicteric, moist mucous membranes  Neck - supple, no JVD  Chest - clear to auscultation, no wheezes, rales or rhonchi  Heart - normal rate, regular rhythm, normal S1, S2, II/IV systolic murmur LUSB  Abdomen - soft, nontender, nondistended, no masses or organomegaly  Extremities - peripheral pulses normal, no pedal edema    Recent Labs:  Lab Results   Component Value Date/Time    Cholesterol, total 145 11/08/2018 09:53 AM    HDL Cholesterol 39 (L) 11/08/2018 09:53 AM    LDL, calculated 90 11/08/2018 09:53 AM    Triglyceride 79 11/08/2018 09:53 AM     Lab Results   Component Value Date/Time    Creatinine 1.02 11/08/2018 09:53 AM     Lab Results   Component Value Date/Time    BUN 16 11/08/2018 09:53 AM     Lab Results   Component Value Date/Time    Potassium 5.0 11/08/2018 09:53 AM     Lab Results   Component Value Date/Time    Hemoglobin A1c 5.3 11/08/2018 09:53 AM     Lab Results   Component Value Date/Time    HGB 12.5 (L) 11/08/2018 09:53 AM     Lab Results   Component Value Date/Time    PLATELET 90 (LL) 26/76/2085 09:53 AM       Reviewed:  Past Medical History:   Diagnosis Date    [facility]) [facility]     Diabetes [facility]     Enlarged prostate     Moderate aortic stenosis     echo June 2016 [address]     Pneumonia      Social History     Tobacco Use   Smoking Status Current Every Day Smoker    Packs/day: 0.50   Smokeless Tobacco Never Used   Tobacco Comment    1 pack a day  ( 16-18 ) a day      Social History     Substance and Sexual Activity   Alcohol Use Yes    Alcohol/week: 8.4 oz    Types: 14 Cans of beer per week    Comment: Social      No Known Allergies    Current Outpatient Medications   Medication Sig    HUMULIN 70/30 U-100 INSULIN 100 unit/mL (70-30) injection INJECT 10 UNITS UNDER THE SKIN BEFORE BREAKFAST AND DINNER.  DISCARD AFTER 28 DAYS FROM FIRST USE    furosemide (LASIX) 40 mg tablet TAKE 1 TABLET BY MOUTH DAILY    meloxicam (MOBIC) 15 mg tablet TAKE 1 TABLET BY MOUTH DAILY AFTER BREAKFAST    varicella-zoster recombinant, PF, (SHINGRIX, PF,) 50 mcg/0.5 mL susr injection 0.5mL by IntraMUSCular route once now and then repeat in 2-6 months    metFORMIN ER (GLUCOPHAGE XR) 500 mg tablet TAKE 1 TABLET BY MOUTH DAILY WITH DINNER    potassium chloride (K-DUR, KLOR-CON) 20 mEq tablet TAKE 1 TABLET BY MOUTH EVERY DAY    tamsulosin (FLOMAX) 0.4 mg capsule TAKE 1 CAPSULE BY MOUTH DAILY    STIOLTO RESPIMAT 2.5-2.5 mcg/actuation mist INHALE 2 PUFFS PO QD    gabapentin (NEURONTIN) 300 mg capsule Take 1 Cap by mouth nightly.  mometasone-formoterol (DULERA) 100-5 mcg/actuation HFA inhaler Take 2 Puffs by inhalation two (2) times a day.  ONETOUCH ULTRA TEST strip USE TO TEST TWICE DAILY    PROAIR HFA 90 mcg/actuation inhaler     VESICARE 10 mg tablet TK 1 T PO QD    MULTIVIT-MIN/FA/LYCOPEN/LUTEIN (CENTRUM SILVER ULTRA MEN'S PO) Take  by mouth.  ONETOUCH ULTRA2 monitoring kit      No current facility-administered medications for this visit.         Missael Wheatley MD  [facility] [place]  [address] [facility] [address]  [address]

## 2018-11-27 NOTE — LETTER
11/28/2018 9:29 AM 
 
Patient:  Nova Thomas YOB: 1940 Date of Visit: 11/27/2018 Dear Marlin Yanes MD 
[phone] VIA In Basket 
 : 
 
Mr. Edgar Yu is 68 yo M with mild to moderate aortic stenosis, RBBB, COPD, HTN seen initially preop cardiac evaluation. Since his last visit, he continues to do okay. No exertional chest pain. His breathing has been stable, though he does have some baseline shortness of breath from his COPD. No significant palpitations, lightheadedness, dizziness or syncope. His echocardiogram today demonstrated continued preserved LV function with moderate aortic stenosis, mean gradient of 24 mmHg. This is slightly increased from 17 mmHg in 2016. He is compensated on exam with clear lungs, a II/VI murmur at the left upper sternal border. Assessment and Plan: 1. Moderate aortic stenosis. Asymptomatic with this. His gradient is slightly increased from last time, but within what would be expected. Would have him follow back in one year with a same day echocardiogram.   
2. Essential hypertension. Blood pressure is controlled. 3. Tobacco use. Encouraged cessation. 4. COPD. If you have questions, please do not hesitate to call me. Sincerely, Roosevelt Prado MD

## 2019-01-19 DIAGNOSIS — E11.40 TYPE 2 DIABETES MELLITUS WITH DIABETIC NEUROPATHY, WITH LONG-TERM CURRENT USE OF INSULIN (HCC): ICD-10-CM

## 2019-01-19 DIAGNOSIS — Z79.4 TYPE 2 DIABETES MELLITUS WITH DIABETIC NEUROPATHY, WITH LONG-TERM CURRENT USE OF INSULIN (HCC): ICD-10-CM

## 2019-01-19 DIAGNOSIS — M16.11 ARTHRITIS OF RIGHT HIP: ICD-10-CM

## 2019-01-20 RX ORDER — TAMSULOSIN HYDROCHLORIDE 0.4 MG/1
CAPSULE ORAL
Qty: 30 CAP | Refills: 0 | Status: SHIPPED | OUTPATIENT
Start: 2019-01-20 | End: 2019-02-20 | Stop reason: SDUPTHER

## 2019-01-20 RX ORDER — MELOXICAM 15 MG/1
TABLET ORAL
Qty: 30 TAB | Refills: 0 | Status: SHIPPED | OUTPATIENT
Start: 2019-01-20 | End: 2019-02-20 | Stop reason: SDUPTHER

## 2019-01-20 RX ORDER — GABAPENTIN 300 MG/1
CAPSULE ORAL
Qty: 30 CAP | Refills: 0 | Status: SHIPPED | OUTPATIENT
Start: 2019-01-20 | End: 2019-02-20 | Stop reason: SDUPTHER

## 2019-02-20 DIAGNOSIS — Z79.4 TYPE 2 DIABETES MELLITUS WITH DIABETIC NEUROPATHY, WITH LONG-TERM CURRENT USE OF INSULIN (HCC): ICD-10-CM

## 2019-02-20 DIAGNOSIS — E11.40 TYPE 2 DIABETES MELLITUS WITH DIABETIC NEUROPATHY, WITH LONG-TERM CURRENT USE OF INSULIN (HCC): ICD-10-CM

## 2019-02-20 DIAGNOSIS — M16.11 ARTHRITIS OF RIGHT HIP: ICD-10-CM

## 2019-02-20 RX ORDER — NAPROXEN SODIUM 220 MG
TABLET ORAL
Qty: 100 SYRINGE | Refills: 0 | Status: SHIPPED | OUTPATIENT
Start: 2019-02-20 | End: 2019-03-12 | Stop reason: SDUPTHER

## 2019-02-20 RX ORDER — GABAPENTIN 300 MG/1
CAPSULE ORAL
Qty: 30 CAP | Refills: 0 | Status: SHIPPED | OUTPATIENT
Start: 2019-02-20 | End: 2019-03-12 | Stop reason: SDUPTHER

## 2019-02-20 RX ORDER — TAMSULOSIN HYDROCHLORIDE 0.4 MG/1
CAPSULE ORAL
Qty: 30 CAP | Refills: 0 | Status: SHIPPED | OUTPATIENT
Start: 2019-02-20 | End: 2019-03-12 | Stop reason: SDUPTHER

## 2019-02-20 RX ORDER — MELOXICAM 15 MG/1
TABLET ORAL
Qty: 30 TAB | Refills: 0 | Status: SHIPPED | OUTPATIENT
Start: 2019-02-20 | End: 2019-03-22 | Stop reason: SDUPTHER

## 2019-02-22 ENCOUNTER — DOCUMENTATION ONLY (OUTPATIENT)
Dept: FAMILY MEDICINE CLINIC | Age: 79
End: 2019-02-22

## 2019-03-12 ENCOUNTER — OFFICE VISIT (OUTPATIENT)
Dept: FAMILY MEDICINE CLINIC | Age: 79
End: 2019-03-12

## 2019-03-12 VITALS
BODY MASS INDEX: 28 KG/M2 | TEMPERATURE: 97.5 F | HEIGHT: 71 IN | HEART RATE: 66 BPM | SYSTOLIC BLOOD PRESSURE: 120 MMHG | RESPIRATION RATE: 16 BRPM | OXYGEN SATURATION: 97 % | WEIGHT: 200 LBS | DIASTOLIC BLOOD PRESSURE: 78 MMHG

## 2019-03-12 DIAGNOSIS — I35.0 NONRHEUMATIC AORTIC VALVE STENOSIS: ICD-10-CM

## 2019-03-12 DIAGNOSIS — E11.40 TYPE 2 DIABETES MELLITUS WITH DIABETIC NEUROPATHY, WITH LONG-TERM CURRENT USE OF INSULIN (HCC): Primary | ICD-10-CM

## 2019-03-12 DIAGNOSIS — G89.29 CHRONIC MIDLINE LOW BACK PAIN WITHOUT SCIATICA: ICD-10-CM

## 2019-03-12 DIAGNOSIS — Z79.4 TYPE 2 DIABETES MELLITUS WITH DIABETIC NEUROPATHY, WITH LONG-TERM CURRENT USE OF INSULIN (HCC): Primary | ICD-10-CM

## 2019-03-12 DIAGNOSIS — R60.9 EDEMA, UNSPECIFIED TYPE: ICD-10-CM

## 2019-03-12 DIAGNOSIS — Z79.4 CONTROLLED TYPE 2 DIABETES MELLITUS WITH DIABETIC AUTONOMIC NEUROPATHY, WITH LONG-TERM CURRENT USE OF INSULIN (HCC): ICD-10-CM

## 2019-03-12 DIAGNOSIS — M54.50 CHRONIC MIDLINE LOW BACK PAIN WITHOUT SCIATICA: ICD-10-CM

## 2019-03-12 DIAGNOSIS — M16.11 ARTHRITIS OF RIGHT HIP: ICD-10-CM

## 2019-03-12 DIAGNOSIS — D69.6 THROMBOCYTOPENIA (HCC): ICD-10-CM

## 2019-03-12 DIAGNOSIS — J43.9 PULMONARY EMPHYSEMA, UNSPECIFIED EMPHYSEMA TYPE (HCC): ICD-10-CM

## 2019-03-12 DIAGNOSIS — E11.43 CONTROLLED TYPE 2 DIABETES MELLITUS WITH DIABETIC AUTONOMIC NEUROPATHY, WITH LONG-TERM CURRENT USE OF INSULIN (HCC): ICD-10-CM

## 2019-03-12 DIAGNOSIS — J44.9 CHRONIC OBSTRUCTIVE PULMONARY DISEASE, UNSPECIFIED COPD TYPE (HCC): ICD-10-CM

## 2019-03-12 PROBLEM — Z85.51 HISTORY OF BLADDER CANCER: Status: ACTIVE | Noted: 2018-02-05

## 2019-03-12 RX ORDER — POTASSIUM CHLORIDE 20 MEQ/1
TABLET, EXTENDED RELEASE ORAL
Qty: 90 TAB | Refills: 3 | Status: SHIPPED | OUTPATIENT
Start: 2019-03-12 | End: 2020-02-13 | Stop reason: SDUPTHER

## 2019-03-12 RX ORDER — GABAPENTIN 300 MG/1
CAPSULE ORAL
Qty: 90 CAP | Refills: 1 | Status: SHIPPED | OUTPATIENT
Start: 2019-03-12 | End: 2019-03-22 | Stop reason: SDUPTHER

## 2019-03-12 RX ORDER — METFORMIN HYDROCHLORIDE 500 MG/1
TABLET, EXTENDED RELEASE ORAL
Qty: 90 TAB | Refills: 1 | Status: SHIPPED | OUTPATIENT
Start: 2019-03-12 | End: 2020-02-13 | Stop reason: SDUPTHER

## 2019-03-12 RX ORDER — CALCIUM CARB/VITAMIN D3/VIT K1 500-100-40
TABLET,CHEWABLE ORAL
Refills: 0 | COMMUNITY
Start: 2019-01-14 | End: 2019-06-24 | Stop reason: SDUPTHER

## 2019-03-12 RX ORDER — FUROSEMIDE 40 MG/1
TABLET ORAL
Qty: 90 TAB | Refills: 1 | Status: SHIPPED | OUTPATIENT
Start: 2019-03-12 | End: 2020-02-13 | Stop reason: SDUPTHER

## 2019-03-12 RX ORDER — TAMSULOSIN HYDROCHLORIDE 0.4 MG/1
CAPSULE ORAL
Qty: 90 CAP | Refills: 1 | Status: SHIPPED | OUTPATIENT
Start: 2019-03-12 | End: 2019-03-22 | Stop reason: SDUPTHER

## 2019-03-12 RX ORDER — SYRINGE-NEEDLE,INSULIN,0.5 ML 27GX1/2"
SYRINGE, EMPTY DISPOSABLE MISCELLANEOUS
Qty: 100 SYRINGE | Refills: 1 | Status: SHIPPED | OUTPATIENT
Start: 2019-03-12 | End: 2019-08-15 | Stop reason: SDUPTHER

## 2019-03-12 NOTE — ASSESSMENT & PLAN NOTE
Well Controlled, based on history, physical exam and review of pertinent labs, studies and medications; meds reconciled; continue current treatment plan, lifestyle modifications recommended, medication compliance emphasized. Key Antihyperglycemic Medications             insulin NPH/insulin regular (HUMULIN 70/30 U-100 INSULIN) 100 unit/mL (70-30) injection  (Taking) INJECT 10 UNITS UNDER THE SKIN BEFORE BREAKFAST AND DINNER.  DISCARD AFTER 28 DAYS FROM FIRST USE    metFORMIN ER (GLUCOPHAGE XR) 500 mg tablet  (Taking) TAKE 1 TABLET BY MOUTH DAILY WITH DINNER        Other Key Diabetic Medications             gabapentin (NEURONTIN) 300 mg capsule  (Taking) TAKE 1 CAPSULE BY MOUTH EVERY NIGHT        Lab Results   Component Value Date/Time    Hemoglobin A1c 5.3 11/08/2018 09:53 AM    Glucose 113 11/08/2018 09:53 AM    Creatinine 1.02 11/08/2018 09:53 AM    Microalb/Creat ratio (ug/mg creat.) 56.5 11/08/2018 10:00 AM    Cholesterol, total 145 11/08/2018 09:53 AM    HDL Cholesterol 39 11/08/2018 09:53 AM    LDL, calculated 90 11/08/2018 09:53 AM    Triglyceride 79 11/08/2018 09:53 AM     Diabetic Foot and Eye Exam HM Status   Topic Date Due    Diabetic Foot Care  06/07/2019

## 2019-03-12 NOTE — PROGRESS NOTES
HISTORY OF PRESENT ILLNESS  Iwona Brenner is a 69 y.o. male. he was seen for follow up on diabetes, HTN, COPD and also to discuss his chronic back pain and right hip pain,  HPI  Endocrine Review  He is seen for diabetes.  Since last visit he reports: no polyuria or polydipsia, no chest pain, dyspnea or TIA's, no unusual visual symptoms, no hypoglycemia, no medication side effects noted, has noticed numbness both feet.  Home glucose monitoring: is performed regularly, fasting values range 140-200  He is checking his sugars 2 per day. Jason Shannon reports medication compliance: compliant all of the time.  Medication side effects: none.  Diabetic diet compliance: compliant all of the time.  Lab review: labs reviewed and discussed with patient.  Eye exam: overdue.  Referral done  His sliding scale Insulin was changed to NPH 10 units bid on last visit  Has seen Dr Jeimy Howard in early January. Lab Results   Component Value Date/Time    Hemoglobin A1c 5.3 11/08/2018 09:53 AM    Hemoglobin A1c (POC) 5.4 06/28/2017 11:27 AM        COPD  Patient complains of cough, fatigue and shortness of breath. Symptoms began several years ago. Symptoms chronic dyspnea: severity = mild: course of sx: well controlled  becomes dyspneic after 5 blocks  symptoms worse with exertion. does worsen with exertion. Sputum is clear in small amounts.  Patient currently is not on home oxygen therapy. Tucker Woodruff Respiratory history: pneumonia, COPD and history of 10  pack years tobacco use  Follows Dr Emelyn Coburn. PFT not c/w obstructive disease. He was advised to continue on Dulera and prn Albuterol. in spite of counseling, he is still smoking  He is now on Stioloto as per records. As per him he taked [facility] that I prescribed before and that Only taking as needed.     Hip Pain  Patient complains of right hip pain. Onset of the symptoms was several years ago. Inciting event: none.  Current symptoms include right sided hip pain.  Severity = moderate to severe  location: lateral, over greater trochanter, anterior  radiates to: right inguinal region, right sided  upper leg(s): anterior, lateral  worse with weight bearing. Associated symptoms: none. Aggravating symptoms: rising after sitting, any weight bearing. Patient's overall course: gradually worsening and course of pain: gradually worsening. Patient has had prior hip problems. Previous visits for this problem: none. Evaluation to date: Xray hip done in 11/2018 showed advanced arthritis. Was referred to specialist, not has been seen yet    Treatment to date: Meloxicam.    Had Xray lumbar spine done on last visit which showed lumbar spondylosis. Was referred to spine specialist,has not made appointment yet. Review of Systems   Constitutional: Negative for chills, fever and malaise/fatigue. HENT: Negative for congestion, ear pain, sore throat and tinnitus. Eyes: Negative for blurred vision, double vision, pain and discharge. Respiratory: Negative for cough, shortness of breath and wheezing. Cardiovascular: Negative for chest pain, palpitations and leg swelling. Gastrointestinal: Negative for abdominal pain, blood in stool, constipation, diarrhea, nausea and vomiting. Genitourinary: Negative for dysuria, frequency, hematuria and urgency. Musculoskeletal: Positive for back pain. Negative for joint pain and myalgias. Right hip pain   Skin: Negative for rash. Neurological: Negative for dizziness, tremors, seizures and headaches. Endo/Heme/Allergies: Negative for polydipsia. Does not bruise/bleed easily. Psychiatric/Behavioral: Negative for depression and substance abuse. The patient is not nervous/anxious. Physical Exam   Constitutional: He is oriented to person, place, and time. He appears well-developed and well-nourished. HENT:   Head: Normocephalic and atraumatic. Right Ear: External ear normal.   Mouth/Throat: Oropharynx is clear and moist. No oropharyngeal exudate. Eyes: Conjunctivae and EOM are normal. Pupils are equal, round, and reactive to light. No scleral icterus. Neck: Normal range of motion. Neck supple. No JVD present. No thyromegaly present. Cardiovascular: Normal rate, regular rhythm, normal heart sounds and intact distal pulses. No murmur heard. Pulmonary/Chest: Effort normal and breath sounds normal. He has no wheezes. Abdominal: Soft. Bowel sounds are normal. He exhibits no distension and no mass. Musculoskeletal: He exhibits no edema. Right hip: He exhibits decreased range of motion and tenderness. Back:    Feet:warm, good capillary refill, no trophic changes or ulcerative lesions, normal DP and PT pulses and reduced sensation at both feet    right Hip   Tenderness: Greater trochanter, Lateral and Anterior    Positive ADAMS and FADIR     Lymphadenopathy:     He has no cervical adenopathy. Neurological: He is alert and oriented to person, place, and time. He has normal reflexes. No cranial nerve deficit. Skin: Skin is warm and dry. No rash noted. He is not diaphoretic. Psychiatric: He has a normal mood and affect. Nursing note and vitals reviewed. ASSESSMENT and PLAN  Diagnoses and all orders for this visit:    1. Type 2 diabetes mellitus with diabetic neuropathy, with long-term current use of insulin (HCC)  -     gabapentin (NEURONTIN) 300 mg capsule; TAKE 1 CAPSULE BY MOUTH EVERY NIGHT  -     insulin NPH/insulin regular (HUMULIN 70/30 U-100 INSULIN) 100 unit/mL (70-30) injection; INJECT 10 UNITS UNDER THE SKIN BEFORE BREAKFAST AND DINNER.  DISCARD AFTER 28 DAYS FROM FIRST USE  -     metFORMIN ER (GLUCOPHAGE XR) 500 mg tablet; TAKE 1 TABLET BY MOUTH DAILY WITH DINNER  -     Insulin Syringe-Needle U-100 (BD LO-DOSE MICRO-FINE IV) 1/2 mL 28 gauge x 1/2\" syrg; Use to inject Insulin 10 units twice daily  -     METABOLIC PANEL, COMPREHENSIVE  -     HEMOGLOBIN A1C WITH EAG  -     LIPID PANEL  -     MICROALBUMIN, UR, RAND W/ MICROALB/CREAT RATIO    2. Thrombocytopenia ([facility]  Assessment & Plan: This condition is managed by Specialist., follows DR Esetlita Roberson  Lab Results   Component Value Date/Time    WBC 5.1 11/08/2018 09:53 AM    HGB 12.5 11/08/2018 09:53 AM    HCT 34.8 11/08/2018 09:53 AM    PLATELET 90 42/51/9770 09:53 AM    Creatinine 1.02 11/08/2018 09:53 AM    BUN 16 11/08/2018 09:53 AM    Potassium 5.0 11/08/2018 09:53 AM       Orders:  -     CBC WITH AUTOMATED DIFF    3. Chronic obstructive pulmonary disease, unspecified COPD type ([facility]  Assessment & Plan:  Stable, based on history, physical exam and review of pertinent labs, studies and medications; meds reconciled; continue current treatment plan, lifestyle modifications recommended, medication compliance emphasized, smoking cessation was discussed. Key COPD Medications             mometasone-formoterol (DULERA) 100-5 mcg/actuation HFA inhaler  (Taking) Take 2 Puffs by inhalation two (2) times a day. PROAIR HFA 90 mcg/actuation inhaler  (Taking)         Lab Results   Component Value Date/Time    WBC 5.1 11/08/2018 09:53 AM    HGB 12.5 11/08/2018 09:53 AM    HCT 34.8 11/08/2018 09:53 AM    PLATELET 90 95/74/5638 09:53 AM       Orders:  -     CBC WITH AUTOMATED DIFF    4. Pulmonary emphysema, unspecified emphysema type ([facility]  Assessment & Plan:  Stable, based on history, physical exam and review of pertinent labs, studies and medications; meds reconciled; continue current treatment plan, lifestyle modifications recommended, medication compliance emphasized. Key COPD Medications             mometasone-formoterol (DULERA) 100-5 mcg/actuation HFA inhaler  (Taking) Take 2 Puffs by inhalation two (2) times a day.     PROAIR HFA 90 mcg/actuation inhaler  (Taking)         Lab Results   Component Value Date/Time    WBC 5.1 11/08/2018 09:53 AM    HGB 12.5 11/08/2018 09:53 AM    HCT 34.8 11/08/2018 09:53 AM    PLATELET 90 44/14/9983 09:53 AM       Orders:  -     mometasone-formoterol (DULERA) 100-5 mcg/actuation HFA inhaler; Take 2 Puffs by inhalation two (2) times a day. -     CBC WITH AUTOMATED DIFF    5. Arthritis of right hip  -     REFERRAL TO ORTHOPEDICS    6. Chronic midline low back pain without sciatica  -     REFERRAL TO ORTHOPEDICS    7. Nonrheumatic aortic valve stenosis  -     furosemide (LASIX) 40 mg tablet; TAKE 1 TABLET BY MOUTH DAILY    8. Edema, unspecified type  -     furosemide (LASIX) 40 mg tablet; TAKE 1 TABLET BY MOUTH DAILY  -     potassium chloride (K-DUR, KLOR-CON) 20 mEq tablet; TAKE 1 TABLET BY MOUTH EVERY DAY    9. Controlled type 2 diabetes mellitus with diabetic autonomic neuropathy, with long-term current use of insulin ([facility]  Assessment & Plan:  Well Controlled, based on history, physical exam and review of pertinent labs, studies and medications; meds reconciled; continue current treatment plan, lifestyle modifications recommended, medication compliance emphasized. Key Antihyperglycemic Medications             insulin NPH/insulin regular (HUMULIN 70/30 U-100 INSULIN) 100 unit/mL (70-30) injection  (Taking) INJECT 10 UNITS UNDER THE SKIN BEFORE BREAKFAST AND DINNER.  DISCARD AFTER 28 DAYS FROM FIRST USE    metFORMIN ER (GLUCOPHAGE XR) 500 mg tablet  (Taking) TAKE 1 TABLET BY MOUTH DAILY WITH DINNER        Other Key Diabetic Medications             gabapentin (NEURONTIN) 300 mg capsule  (Taking) TAKE 1 CAPSULE BY MOUTH EVERY NIGHT        Lab Results   Component Value Date/Time    Hemoglobin A1c 5.3 11/08/2018 09:53 AM    Glucose 113 11/08/2018 09:53 AM    Creatinine 1.02 11/08/2018 09:53 AM    Microalb/Creat ratio (ug/mg creat.) 56.5 11/08/2018 10:00 AM    Cholesterol, total 145 11/08/2018 09:53 AM    HDL Cholesterol 39 11/08/2018 09:53 AM    LDL, calculated 90 11/08/2018 09:53 AM    Triglyceride 79 11/08/2018 09:53 AM     Diabetic Foot and Eye Exam HM Status   Topic Date Due    Diabetic Foot Care  06/07/2019         Other orders  -     tamsulosin (FLOMAX) 0.4 mg capsule; TAKE 1 CAPSULE BY MOUTH DAILY    Discussed lifestyle issues and health guidance given  Patient was given an after visit summary which includes diagnoses, vital signs, current medications, instructions and references & authorized prescriptions . Results of labs will be conveyed to patient, once available. Pt verbalized instructions I provided and expressed understanding of discussion that was held today. Follow-up Disposition:  Return in about 4 months (around 7/12/2019) for fasting, follow up.

## 2019-03-12 NOTE — ASSESSMENT & PLAN NOTE
This condition is managed by Specialist., follows DR Edel Delcid  Lab Results   Component Value Date/Time    WBC 5.1 11/08/2018 09:53 AM    HGB 12.5 11/08/2018 09:53 AM    HCT 34.8 11/08/2018 09:53 AM    PLATELET 90 63/05/8520 09:53 AM    Creatinine 1.02 11/08/2018 09:53 AM    BUN 16 11/08/2018 09:53 AM    Potassium 5.0 11/08/2018 09:53 AM

## 2019-03-12 NOTE — PATIENT INSTRUCTIONS
Diabetes and Preventing Falls: Care Instructions  Your Care Instructions    If you are an older adult who has diabetes, you may have a higher risk of falling. Complications of diabetessuch as nerve damage, foot problems, and reduced visionmay increase your risk of a fall. Some of your medicines also may add to your risk. By making your home safer, you can lower your risk of falling. Doing things to prevent diabetes complications may also help to lower your risk. You can make your home safer with a few simple measures. Follow-up care is a key part of your treatment and safety. Be sure to make and go to all appointments, and call your doctor if you are having problems. It's also a good idea to know your test results and keep a list of the medicines you take. How can you care for yourself at home? Taking care of yourself  · Keep your blood sugar at a target level (which you set with your doctor). · Exercise regularly to improve your strength, muscle tone, and balance. Walk if you can. Swimming may be a good choice if you cannot walk easily. · Have your vision checked as often as your doctor recommends. It is usually once a year or more often if you have eye problems. · Know the side effects of the medicines you take. Ask your doctor or pharmacist whether the medicines you take can affect your balance. Sleeping pills or sedatives can affect your balance. · Limit the amount of alcohol you drink. Alcohol can impair your balance and other senses. · Have your doctor check your feet during each visit. If you have a foot problem, see your doctor. Preventing falls at home  · Remove raised doorway thresholds, throw rugs, and clutter. Repair loose carpet or raised areas in the floor. · Move furniture and electrical cords to keep them out of walking paths. · Use nonskid floor wax, and wipe up spills right away, especially on ceramic tile floors. · If you use a walker or cane, put rubber tips on it.  If you use crutches, clean the bottoms of them regularly with an abrasive pad, such as steel wool. · Keep your house well lit, especially Adarsh Fore, and outside walkways. Use night-lights in areas such as hallways and bathrooms. Add extra light switches or use remote switches (such as switches that go on or off when you clap your hands) to make it easier to turn lights on if you have to get up during the night. · Install sturdy handrails on stairways. Put grab bars near your shower, bathtub, and toilet. · Store household items on low shelves so that you do not have to climb or reach high. Or use a reaching device that you can get at a medical supply store. If you have to climb for something, use a step stool with handrails, or ask someone to get it for you. · Keep a cordless phone and a flashlight with new batteries by your bed. If possible, put a phone in each of the main rooms of your house, or carry a cell phone in case you fall and cannot reach a phone. Or you can wear a device around your neck or wrist. You push a button that sends a signal for help. · Wear low-heeled shoes that fit well and give your feet good support. Use footwear with nonskid soles. Check the heels and soles of your shoes for wear. Repair or replace worn heels or soles. · Do not wear socks without shoes on wood floors. · Walk on the grass when the sidewalks are slippery. If you live in an area that gets snow and ice in the winter, sprinkle salt on slippery steps and sidewalks. Where can you learn more? Go to http://meg-zamzam.info/. Enter M990 in the search box to learn more about \"Diabetes and Preventing Falls: Care Instructions. \"  Current as of: March 15, 2018  Content Version: 11.9  © 5582-3464 [employer]. Care instructions adapted under license by [employer] (which disclaims liability or warranty for this information).  If you have questions about a medical condition or this instruction, always ask your healthcare professional. [place] [address] any warranty or liability for your use of this information.

## 2019-03-12 NOTE — PROGRESS NOTES
Chief Complaint   Patient presents with    Diabetes     Follow up, Fasting     Hypertension    COPD    Back Pain     right side of body. patient states \" I am not sure if it's my back or hip or both. After walking the dog I can barely walk \"      Other     off balanced      1. Have you been to the ER, urgent care clinic since your last visit? Hospitalized since your last visit? No    2. Have you seen or consulted any other health care providers outside of the [address] [place] [address] since your last visit? Include any pap smears or colon screening.  No

## 2019-03-12 NOTE — ASSESSMENT & PLAN NOTE
Stable, based on history, physical exam and review of pertinent labs, studies and medications; meds reconciled; continue current treatment plan, lifestyle modifications recommended, medication compliance emphasized. Key COPD Medications             mometasone-formoterol (DULERA) 100-5 mcg/actuation HFA inhaler  (Taking) Take 2 Puffs by inhalation two (2) times a day.     PROAIR HFA 90 mcg/actuation inhaler  (Taking)         Lab Results   Component Value Date/Time    WBC 5.1 11/08/2018 09:53 AM    HGB 12.5 11/08/2018 09:53 AM    HCT 34.8 11/08/2018 09:53 AM    PLATELET 90 40/58/8715 09:53 AM

## 2019-03-12 NOTE — ASSESSMENT & PLAN NOTE
Stable, based on history, physical exam and review of pertinent labs, studies and medications; meds reconciled; continue current treatment plan, lifestyle modifications recommended, medication compliance emphasized, smoking cessation was discussed. Key COPD Medications             mometasone-formoterol (DULERA) 100-5 mcg/actuation HFA inhaler  (Taking) Take 2 Puffs by inhalation two (2) times a day.     PROAIR HFA 90 mcg/actuation inhaler  (Taking)         Lab Results   Component Value Date/Time    WBC 5.1 11/08/2018 09:53 AM    HGB 12.5 11/08/2018 09:53 AM    HCT 34.8 11/08/2018 09:53 AM    PLATELET 90 73/49/9054 09:53 AM

## 2019-03-13 LAB
ALBUMIN SERPL-MCNC: 4 G/DL (ref 3.5–4.8)
ALBUMIN/CREAT UR: 125.9 MG/G CREAT (ref 0–30)
ALBUMIN/GLOB SERPL: 1.2 {RATIO} (ref 1.2–2.2)
ALP SERPL-CCNC: 74 IU/L (ref 39–117)
ALT SERPL-CCNC: 15 IU/L (ref 0–44)
AST SERPL-CCNC: 21 IU/L (ref 0–40)
BASOPHILS # BLD AUTO: 0 X10E3/UL (ref 0–0.2)
BASOPHILS NFR BLD AUTO: 1 %
BILIRUB SERPL-MCNC: 0.7 MG/DL (ref 0–1.2)
BUN SERPL-MCNC: 18 MG/DL (ref 8–27)
BUN/CREAT SERPL: 17 (ref 10–24)
CALCIUM SERPL-MCNC: 9.5 MG/DL (ref 8.6–10.2)
CHLORIDE SERPL-SCNC: 105 MMOL/L (ref 96–106)
CHOLEST SERPL-MCNC: 152 MG/DL (ref 100–199)
CO2 SERPL-SCNC: 24 MMOL/L (ref 20–29)
CREAT SERPL-MCNC: 1.05 MG/DL (ref 0.76–1.27)
CREAT UR-MCNC: 129.1 MG/DL
EOSINOPHIL # BLD AUTO: 0.3 X10E3/UL (ref 0–0.4)
EOSINOPHIL NFR BLD AUTO: 5 %
ERYTHROCYTE [DISTWIDTH] IN BLOOD BY AUTOMATED COUNT: 16.3 % (ref 12.3–15.4)
EST. AVERAGE GLUCOSE BLD GHB EST-MCNC: 91 MG/DL
GLOBULIN SER CALC-MCNC: 3.4 G/DL (ref 1.5–4.5)
GLUCOSE SERPL-MCNC: 119 MG/DL (ref 65–99)
HBA1C MFR BLD: 4.8 % (ref 4.8–5.6)
HCT VFR BLD AUTO: 34.7 % (ref 37.5–51)
HDLC SERPL-MCNC: 40 MG/DL
HGB BLD-MCNC: 11.8 G/DL (ref 13–17.7)
IMM GRANULOCYTES # BLD AUTO: 0 X10E3/UL (ref 0–0.1)
IMM GRANULOCYTES NFR BLD AUTO: 1 %
INTERPRETATION, 910389: NORMAL
LDLC SERPL CALC-MCNC: 93 MG/DL (ref 0–99)
LYMPHOCYTES # BLD AUTO: 1.4 X10E3/UL (ref 0.7–3.1)
LYMPHOCYTES NFR BLD AUTO: 24 %
MCH RBC QN AUTO: 31.6 PG (ref 26.6–33)
MCHC RBC AUTO-ENTMCNC: 34 G/DL (ref 31.5–35.7)
MCV RBC AUTO: 93 FL (ref 79–97)
MICROALBUMIN UR-MCNC: 162.5 UG/ML
MONOCYTES # BLD AUTO: 0.4 X10E3/UL (ref 0.1–0.9)
MONOCYTES NFR BLD AUTO: 7 %
MORPHOLOGY BLD-IMP: ABNORMAL
NEUTROPHILS # BLD AUTO: 3.5 X10E3/UL (ref 1.4–7)
NEUTROPHILS NFR BLD AUTO: 62 %
PLATELET # BLD AUTO: ABNORMAL X10E3/UL
POTASSIUM SERPL-SCNC: 5.2 MMOL/L (ref 3.5–5.2)
PROT SERPL-MCNC: 7.4 G/DL (ref 6–8.5)
RBC # BLD AUTO: 3.73 X10E6/UL (ref 4.14–5.8)
SODIUM SERPL-SCNC: 141 MMOL/L (ref 134–144)
TRIGL SERPL-MCNC: 94 MG/DL (ref 0–149)
VLDLC SERPL CALC-MCNC: 19 MG/DL (ref 5–40)
WBC # BLD AUTO: 5.6 X10E3/UL (ref 3.4–10.8)

## 2019-03-13 NOTE — PROGRESS NOTES
Please inform patient and send letter  hgba1c very low, fasting sugar 119. To decrease dose of Insulin further and to take 8 units bid only. Dolly Jarquin, cholesterol results are normal  CBC showing low hemoglobin, means he is still bleeding slow .  Need to stop smoking strictly  Urine positive for protein,   Please fax results to Dr Bossman Phillips, hematology  thanks

## 2019-03-14 NOTE — PROGRESS NOTES
Outbound call to patient on his cell no answer left a message to return call to office. Contacted home number and patient was not there. Spoke with wife Anat Dupree who is on his PHI. Patients name and  verified. Reviewed all recent lab results and recommendations with wife. She verbalized understanding.  Letter printed with results and results faxed to Dr. Margi Gonzalez

## 2019-03-20 ENCOUNTER — TELEPHONE (OUTPATIENT)
Dept: FAMILY MEDICINE CLINIC | Age: 79
End: 2019-03-20

## 2019-03-20 DIAGNOSIS — M54.50 CHRONIC MIDLINE LOW BACK PAIN WITHOUT SCIATICA: ICD-10-CM

## 2019-03-20 DIAGNOSIS — M16.11 ARTHRITIS OF RIGHT HIP: Primary | ICD-10-CM

## 2019-03-20 DIAGNOSIS — G89.29 CHRONIC MIDLINE LOW BACK PAIN WITHOUT SCIATICA: ICD-10-CM

## 2019-03-20 NOTE — TELEPHONE ENCOUNTER
The Patient is requesting a written order (referral) too see & orthopaedic Physician. I spoke with Israel Cameron @ [facility] orthopaedic & was told that they do not accept his insurance. Please contact the Patient at [phone].

## 2019-03-21 NOTE — TELEPHONE ENCOUNTER
Will do referral to [facility].   Referral done for Dr Kyleigh Byers, [facility],close to him  thanks

## 2019-06-24 ENCOUNTER — OFFICE VISIT (OUTPATIENT)
Dept: FAMILY MEDICINE CLINIC | Age: 79
End: 2019-06-24

## 2019-06-24 VITALS
WEIGHT: 195 LBS | HEIGHT: 71 IN | OXYGEN SATURATION: 94 % | DIASTOLIC BLOOD PRESSURE: 59 MMHG | RESPIRATION RATE: 16 BRPM | TEMPERATURE: 98.9 F | HEART RATE: 67 BPM | BODY MASS INDEX: 27.3 KG/M2 | SYSTOLIC BLOOD PRESSURE: 110 MMHG

## 2019-06-24 DIAGNOSIS — D69.6 THROMBOCYTOPENIA (HCC): ICD-10-CM

## 2019-06-24 DIAGNOSIS — E11.43 CONTROLLED TYPE 2 DIABETES MELLITUS WITH DIABETIC AUTONOMIC NEUROPATHY, WITH LONG-TERM CURRENT USE OF INSULIN (HCC): Primary | ICD-10-CM

## 2019-06-24 DIAGNOSIS — Z85.51 HISTORY OF BLADDER CANCER: ICD-10-CM

## 2019-06-24 DIAGNOSIS — M16.11 ARTHRITIS OF RIGHT HIP: ICD-10-CM

## 2019-06-24 DIAGNOSIS — J43.9 PULMONARY EMPHYSEMA, UNSPECIFIED EMPHYSEMA TYPE (HCC): ICD-10-CM

## 2019-06-24 DIAGNOSIS — Z79.4 CONTROLLED TYPE 2 DIABETES MELLITUS WITH DIABETIC AUTONOMIC NEUROPATHY, WITH LONG-TERM CURRENT USE OF INSULIN (HCC): Primary | ICD-10-CM

## 2019-06-24 DIAGNOSIS — B35.1 ONYCHOMYCOSIS: ICD-10-CM

## 2019-06-24 DIAGNOSIS — I35.0 NONRHEUMATIC AORTIC VALVE STENOSIS: ICD-10-CM

## 2019-06-24 DIAGNOSIS — I10 BENIGN ESSENTIAL HTN: ICD-10-CM

## 2019-06-24 RX ORDER — SOLIFENACIN SUCCINATE 10 MG/1
TABLET, FILM COATED ORAL
COMMUNITY
Start: 2016-11-21 | End: 2019-06-24 | Stop reason: ALTCHOICE

## 2019-06-24 RX ORDER — SYRINGE-NEEDLE,INSULIN,0.5 ML 28 GAUGE
SYRINGE, EMPTY DISPOSABLE MISCELLANEOUS
COMMUNITY
Start: 2019-01-14 | End: 2019-06-24 | Stop reason: SDUPTHER

## 2019-06-24 RX ORDER — NAPROXEN SODIUM 220 MG
TABLET ORAL
Refills: 1 | COMMUNITY
Start: 2019-06-18 | End: 2019-06-24 | Stop reason: SDUPTHER

## 2019-06-24 RX ORDER — METFORMIN HYDROCHLORIDE 500 MG/1
TABLET, EXTENDED RELEASE ORAL
COMMUNITY
Start: 2019-03-12 | End: 2019-06-24 | Stop reason: SDUPTHER

## 2019-06-24 RX ORDER — TAMSULOSIN HYDROCHLORIDE 0.4 MG/1
CAPSULE ORAL
COMMUNITY
Start: 2019-03-14 | End: 2019-06-24 | Stop reason: SDUPTHER

## 2019-06-24 RX ORDER — GABAPENTIN 300 MG/1
CAPSULE ORAL
COMMUNITY
Start: 2019-03-20 | End: 2019-06-24 | Stop reason: SDUPTHER

## 2019-06-24 RX ORDER — POTASSIUM CHLORIDE 20 MEQ/1
TABLET, EXTENDED RELEASE ORAL
COMMUNITY
Start: 2019-03-14 | End: 2019-06-24 | Stop reason: SDUPTHER

## 2019-06-24 RX ORDER — SYRINGE-NEEDLE,INSULIN,0.5 ML 28 GAUGE
SYRINGE, EMPTY DISPOSABLE MISCELLANEOUS
COMMUNITY
Start: 2019-03-12 | End: 2019-06-24 | Stop reason: SDUPTHER

## 2019-06-24 RX ORDER — FUROSEMIDE 40 MG/1
TABLET ORAL
COMMUNITY
Start: 2019-03-14 | End: 2019-06-24 | Stop reason: SDUPTHER

## 2019-06-24 RX ORDER — INSULIN PUMP SYRINGE, 3 ML
EACH MISCELLANEOUS
COMMUNITY
Start: 2016-06-12 | End: 2019-12-04 | Stop reason: ALTCHOICE

## 2019-06-24 RX ORDER — ALBUTEROL SULFATE 90 UG/1
1 AEROSOL, METERED RESPIRATORY (INHALATION)
COMMUNITY
Start: 2016-11-08

## 2019-06-24 NOTE — PROGRESS NOTES
Chief Complaint   Patient presents with    Diabetes     Follow up , Fasting    Hypertension     1. Have you been to the ER, urgent care clinic since your last visit? Hospitalized since your last visit? No    2. Have you seen or consulted any other health care providers outside of the [address] [place] [address] since your last visit? Include any pap smears or colon screening.  No

## 2019-06-24 NOTE — ASSESSMENT & PLAN NOTE
Well Controlled, based on history, physical exam and review of pertinent labs, studies and medications; meds reconciled; continue current treatment plan, lifestyle modifications recommended, medication compliance emphasized. Key Antihyperglycemic Medications             insulin NPH/insulin regular (HUMULIN 70/30 U-100 INSULIN) 100 unit/mL (70-30) injection (Taking) INJECT 10 UNITS UNDER THE SKIN BEFORE BREAKFAST AND DINNER.  DISCARD AFTER 28 DAYS FROM FIRST USE    metFORMIN ER (GLUCOPHAGE XR) 500 mg tablet (Taking) TAKE 1 TABLET BY MOUTH DAILY WITH DINNER        Other Key Diabetic Medications             gabapentin (NEURONTIN) 300 mg capsule (Taking) TAKE 1 CAPSULE BY MOUTH EVERY NIGHT        Lab Results   Component Value Date/Time    Hemoglobin A1c 4.8 03/12/2019 10:18 AM    Glucose 119 03/12/2019 10:18 AM    Creatinine 1.05 03/12/2019 10:18 AM    Microalb/Creat ratio (ug/mg creat.) 125.9 03/12/2019 10:18 AM    Cholesterol, total 152 03/12/2019 10:18 AM    HDL Cholesterol 40 03/12/2019 10:18 AM    LDL, calculated 93 03/12/2019 10:18 AM    Triglyceride 94 03/12/2019 10:18 AM     Diabetic Foot and Eye Exam HM Status   Topic Date Due    Diabetic Foot Care  06/07/2019

## 2019-06-24 NOTE — ASSESSMENT & PLAN NOTE
Well Controlled, based on history, physical exam and review of pertinent labs, studies and medications; meds reconciled; continue current treatment plan, lifestyle modifications recommended, medication compliance emphasized, strogly recommended for stopping smoking., This condition is managed by Specialist.  Key COPD Medications             albuterol (PROAIR HFA) 90 mcg/actuation inhaler (Taking) Take 1 Puff by inhalation. mometasone-formoterol (DULERA) 100-5 mcg/actuation HFA inhaler (Taking) INL 2 PFS PO BID    mometasone-formoterol (DULERA) 100-5 mcg/actuation HFA inhaler (Taking) Take 2 Puffs by inhalation two (2) times a day.         Lab Results   Component Value Date/Time    WBC 5.6 03/12/2019 10:18 AM    HGB 11.8 03/12/2019 10:18 AM    HCT 34.7 03/12/2019 10:18 AM    PLATELET CANCELED 11/74/8564 10:18 AM

## 2019-06-24 NOTE — PROGRESS NOTES
HISTORY OF PRESENT ILLNESS  Amina Koch is a 69 y.o. male. seen for follow up on several issues. HPI  He has h/o DM,dyslipidemia, HTN  H/o bladder cancer and follows Dr Berkley Lawler. He is s/p TURBT ,and Bx was positive for invasive bladder cancer. He had BCG bladder instillations as per records. His hematuria has been resolved. Was referred to ortho for his worsening back pain and right hip pain, he has advanced arthritis of right hip joint. Is recommended to get hip replacement. Also he is getting PT for hip and back. Also follows hematology for chronic thrombocytopenia and cardiology for aortic stenosis. Endocrine Review  He is seen for diabetes.  Since last visit he reports: no polyuria or polydipsia, no chest pain, dyspnea or TIA's, no unusual visual symptoms, no hypoglycemia, no medication side effects noted, has noticed numbness both feet.  Home glucose monitoring: is performed regularly, fasting values range 140-200  He is checking his sugars 2 per day.  He reports medication compliance: compliant all of the time.  Medication side effects: none.  Diabetic diet compliance: compliant all of the time.  Lab review: labs reviewed and discussed with patient.  Eye exam: overdue.  Referral done  His sliding scale Insulin was changed to NPH 8 units bid on last visit  Has seen Dr Abby Angulo in early January. Lab Results   Component Value Date/Time    Hemoglobin A1c 4.8 03/12/2019 10:18 AM    Hemoglobin A1c (POC) 5.4 06/28/2017 11:27 AM        COPD  Patient complains of cough, fatigue and shortness of breath. Symptoms began several years ago. Symptoms chronic dyspnea: severity = mild: course of sx: well controlled  becomes dyspneic after 5 blocks  symptoms worse with exertion. does worsen with exertion. Sputum is clear in small amounts.  Patient currently is not on home oxygen therapy. Mina Herrera Respiratory history: pneumonia, COPD and history of 10  pack years tobacco use  Follows Dr Seb Coleman.  PFT not c/w obstructive disease. He was advised to continue on Dulera and prn Albuterol. in spite of counseling, he is still smoking  He is now on Stioloto as per records. As per him he taked [facility] that I prescribed before and that Only taking as needed.     Hip Pain  Patient complains of right hip pain. Onset of the symptoms was several years ago. Inciting event: none. Current symptoms include right sided hip pain.  Severity = moderate to severe  location: lateral, over greater trochanter, anterior  radiates to: right inguinal region, right sided  upper leg(s): anterior, lateral  worse with weight bearing. Associated symptoms: none. Aggravating symptoms: rising after sitting, any weight bearing. Patient's overall course: gradually worsening and course of pain: gradually worsening. Patient has had prior hip problems. Previous visits for this problem: none. Evaluation to date: Xray hip done in 11/2018 showed advanced arthritis. Was referred to specialist, is getting PT    Treatment to date: Meloxicam.( which was stopped recently)     Had Xray lumbar spine done on last visit which showed lumbar spondylosis. Was referred to spine specialist,is getting PT                    Review of Systems   Constitutional: Negative for chills, fever and malaise/fatigue. HENT: Negative for congestion, ear pain, sore throat and tinnitus. Eyes: Negative for blurred vision, double vision, pain and discharge. Respiratory: Negative for cough, shortness of breath and wheezing. Cardiovascular: Negative for chest pain, palpitations and leg swelling. Gastrointestinal: Negative for abdominal pain, blood in stool, constipation, diarrhea, nausea and vomiting. Genitourinary: Negative for dysuria, frequency, hematuria and urgency. Musculoskeletal: Positive for back pain. Negative for joint pain and myalgias. Right hip pain   Skin: Negative for rash. Neurological: Negative for dizziness, tremors, seizures and headaches.    Endo/Heme/Allergies: Negative for polydipsia. Bruises/bleeds easily. Psychiatric/Behavioral: Negative for depression and substance abuse. The patient is not nervous/anxious. Physical Exam   Constitutional: He is oriented to person, place, and time. He appears well-developed and well-nourished. HENT:   Head: Normocephalic and atraumatic. Right Ear: External ear normal.   Mouth/Throat: Oropharynx is clear and moist. No oropharyngeal exudate. Eyes: Pupils are equal, round, and reactive to light. Conjunctivae and EOM are normal. No scleral icterus. Neck: Normal range of motion. Neck supple. No JVD present. No thyromegaly present. Cardiovascular: Normal rate, regular rhythm, normal heart sounds and intact distal pulses. No murmur heard. Pulmonary/Chest: Effort normal and breath sounds normal. He has no wheezes. Abdominal: Soft. Bowel sounds are normal. He exhibits no distension and no mass. Musculoskeletal: He exhibits no edema. Right hip: He exhibits decreased range of motion and tenderness. Back:    Feet:warm, good capillary refill, no trophic changes or ulcerative lesions, normal DP and PT pulses and reduced sensation at both feet. Has yellow discoloration with thickening of all toes of both feet    right Hip   Tenderness: Greater trochanter, Lateral and Anterior  Positive ADAMS and FADIR     Lymphadenopathy:     He has no cervical adenopathy. Neurological: He is alert and oriented to person, place, and time. He has normal reflexes. No cranial nerve deficit. Skin: Skin is warm and dry. No rash noted. He is not diaphoretic. Psychiatric: He has a normal mood and affect. Nursing note and vitals reviewed. ASSESSMENT and PLAN  Diagnoses and all orders for this visit:    1.  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 Key Antihyperglycemic Medications             insulin NPH/insulin regular (HUMULIN 70/30 U-100 INSULIN) 100 unit/mL (70-30) injection (Taking) INJECT 10 UNITS UNDER THE SKIN BEFORE BREAKFAST AND DINNER. DISCARD AFTER 28 DAYS FROM FIRST USE    metFORMIN ER (GLUCOPHAGE XR) 500 mg tablet (Taking) TAKE 1 TABLET BY MOUTH DAILY WITH DINNER        Other Key Diabetic Medications             gabapentin (NEURONTIN) 300 mg capsule (Taking) TAKE 1 CAPSULE BY MOUTH EVERY NIGHT        Lab Results   Component Value Date/Time    Hemoglobin A1c 4.8 03/12/2019 10:18 AM    Glucose 119 03/12/2019 10:18 AM    Creatinine 1.05 03/12/2019 10:18 AM    Microalb/Creat ratio (ug/mg creat.) 125.9 03/12/2019 10:18 AM    Cholesterol, total 152 03/12/2019 10:18 AM    HDL Cholesterol 40 03/12/2019 10:18 AM    LDL, calculated 93 03/12/2019 10:18 AM    Triglyceride 94 03/12/2019 10:18 AM     Diabetic Foot and Eye Exam  Status   Topic Date Due    Diabetic Foot Care  06/07/2019       Orders:  -     METABOLIC PANEL, COMPREHENSIVE  -     HEMOGLOBIN A1C WITH EAG  -     LIPID PANEL  -      DIABETES FOOT EXAM    2. History of bladder cancer    3. Nonrheumatic aortic valve stenosis    4. Benign essential HTN  -     METABOLIC PANEL, COMPREHENSIVE    5. Arthritis of right hip    6. Pulmonary emphysema, unspecified emphysema type ([facility] [address]  Assessment & Plan:  Well Controlled, based on history, physical exam and review of pertinent labs, studies and medications; meds reconciled; continue current treatment plan, lifestyle modifications recommended, medication compliance emphasized, strogly recommended for stopping smoking., This condition is managed by Specialist.  Key COPD Medications             albuterol (PROAIR HFA) 90 mcg/actuation inhaler (Taking) Take 1 Puff by inhalation.     mometasone-formoterol (DULERA) 100-5 mcg/actuation HFA inhaler (Taking) INL 2 PFS PO BID    mometasone-formoterol (DULERA) 100-5 mcg/actuation HFA inhaler (Taking) Take 2 Puffs by inhalation two (2) times a day. Lab Results   Component Value Date/Time    WBC 5.6 03/12/2019 10:18 AM    HGB 11.8 03/12/2019 10:18 AM    HCT 34.7 03/12/2019 10:18 AM    PLATELET CANCELED 95/29/1563 10:18 AM       Orders:  -     CBC WITH AUTOMATED DIFF    7. Thrombocytopenia ([facility] [address]  Assessment & Plan:  Stable, based on history, physical exam and review of pertinent labs, studies and medications; meds reconciled; continue current treatment plan, lifestyle modifications recommended, medication compliance emphasized, recommended to stop smoking., This condition is managed by Specialist.  Lab Results   Component Value Date/Time    WBC 5.6 03/12/2019 10:18 AM    HGB 11.8 03/12/2019 10:18 AM    HCT 34.7 03/12/2019 10:18 AM    PLATELET CANCELED 44/71/4779 10:18 AM    Creatinine 1.05 03/12/2019 10:18 AM    BUN 18 03/12/2019 10:18 AM    Potassium 5.2 03/12/2019 10:18 AM       Orders:  -     CBC WITH AUTOMATED DIFF    8. Onychomycosis  discussed PO medicine and also topical. He wants to go to podiatry and get his nails removed. Discussed lifestyle issues and health guidance given  Patient was given an after visit summary which includes diagnoses, vital signs, current medications, instructions and references & authorized prescriptions . Results of labs will be conveyed to patient, once available. Pt verbalized instructions I provided and expressed understanding of discussion that was held today. Follow-up and Dispositions    · Return in about 4 months (around 10/24/2019) for fasting, follow up, DM.

## 2019-06-24 NOTE — PATIENT INSTRUCTIONS
Diabetes Foot Health: Care Instructions  Your Care Instructions    When you have diabetes, your feet need extra care and attention. Diabetes can damage the nerve endings and blood vessels in your feet, making you less likely to notice when your feet are injured. Diabetes also limits your body's ability to fight infection and get blood to areas that need it. If you get a minor foot injury, it could become an ulcer or a serious infection. With good foot care, you can prevent most of these problems. Caring for your feet can be quick and easy. Most of the care can be done when you are bathing or getting ready for bed. Follow-up care is a key part of your treatment and safety. Be sure to make and go to all appointments, and call your doctor if you are having problems. It's also a good idea to know your test results and keep a list of the medicines you take. How can you care for yourself at home? · Keep your blood sugar close to normal by watching what and how much you eat, monitoring blood sugar, taking medicines if prescribed, and getting regular exercise. · Do not smoke. Smoking affects blood flow and can make foot problems worse. If you need help quitting, talk to your doctor about stop-smoking programs and medicines. These can increase your chances of quitting for good. · Eat a diet that is low in fats. High fat intake can cause fat to build up in your blood vessels and decrease blood flow. · Inspect your feet daily for blisters, cuts, cracks, or sores. If you cannot see well, use a mirror or have someone help you. · Take care of your feet:  ? Wash your feet every day. Use warm (not hot) water. Check the water temperature with your wrists or other part of your body, not your feet. ? Dry your feet well. Pat them dry. Do not rub the skin on your feet too hard. Dry well between your toes. If the skin on your feet stays moist, bacteria or a fungus can grow, which can lead to infection. ?  Keep your skin soft. Use moisturizing skin cream to keep the skin on your feet soft and prevent calluses and cracks. But do not put the cream between your toes, and stop using any cream that causes a rash. ? Clean underneath your toenails carefully. Do not use a sharp object to clean underneath your toenails. Use the blunt end of a nail file or other rounded tool. ? Trim and file your toenails straight across to prevent ingrown toenails. Use a nail clipper, not scissors. Use an emery board to smooth the edges. · Change socks daily. Socks without seams are best, because seams often rub the feet. You can find socks for people with diabetes from specialty catalogs. · Look inside your shoes every day for things like gravel or torn linings, which could cause blisters or sores. · Buy shoes that fit well:  ? Look for shoes that have plenty of space around the toes. This helps prevent bunions and blisters. ? Try on shoes while wearing the kind of socks you will usually wear with the shoes. ? Avoid plastic shoes. They may rub your feet and cause blisters. Good shoes should be made of materials that are flexible and breathable, such as leather or cloth. ? Break in new shoes slowly by wearing them for no more than an hour a day for several days. Take extra time to check your feet for red areas, blisters, or other problems after you wear new shoes. · Do not go barefoot. Do not wear sandals, and do not wear shoes with very thin soles. Thin soles are easy to puncture. They also do not protect your feet from hot pavement or cold weather. · Have your doctor check your feet during each visit. If you have a foot problem, see your doctor. Do not try to treat an early foot problem at home. Home remedies or treatments that you can buy without a prescription (such as corn removers) can be harmful. · Always get early treatment for foot problems. A minor irritation can lead to a major problem if not properly cared for early.   When should you call for help? Call your doctor now or seek immediate medical care if:    · You have a foot sore, an ulcer or break in the skin that is not healing after 4 days, bleeding corns or calluses, or an ingrown toenail.     · You have blue or black areas, which can mean bruising or blood flow problems.     · You have peeling skin or tiny blisters between your toes or cracking or oozing of the skin.     · You have a fever for more than 24 hours and a foot sore.     · You have new numbness or tingling in your feet that does not go away after you move your feet or change positions.     · You have unexplained or unusual swelling of the foot or ankle.    Watch closely for changes in your health, and be sure to contact your doctor if:    · You cannot do proper foot care. Where can you learn more? Go to http://meg-zamzam.info/. Enter A739 in the search box to learn more about \"Diabetes Foot Health: Care Instructions. \"  Current as of: July 25, 2018  Content Version: 11.9  © 2015-4161 [employer]. Care instructions adapted under license by [employer] (which disclaims liability or warranty for this information). If you have questions about a medical condition or this instruction, always ask your healthcare professional. [address] any warranty or liability for your use of this information.

## 2019-06-24 NOTE — ASSESSMENT & PLAN NOTE
Stable, based on history, physical exam and review of pertinent labs, studies and medications; meds reconciled; continue current treatment plan, lifestyle modifications recommended, medication compliance emphasized, recommended to stop smoking., This condition is managed by Specialist.  Lab Results   Component Value Date/Time    WBC 5.6 03/12/2019 10:18 AM    HGB 11.8 03/12/2019 10:18 AM    HCT 34.7 03/12/2019 10:18 AM    PLATELET CANCELED 84/08/5899 10:18 AM    Creatinine 1.05 03/12/2019 10:18 AM    BUN 18 03/12/2019 10:18 AM    Potassium 5.2 03/12/2019 10:18 AM

## 2019-06-25 LAB
ALBUMIN SERPL-MCNC: 4.1 G/DL (ref 3.5–4.8)
ALBUMIN/GLOB SERPL: 1.4 {RATIO} (ref 1.2–2.2)
ALP SERPL-CCNC: 74 IU/L (ref 39–117)
ALT SERPL-CCNC: 13 IU/L (ref 0–44)
AST SERPL-CCNC: 14 IU/L (ref 0–40)
BASOPHILS # BLD AUTO: 0 X10E3/UL (ref 0–0.2)
BASOPHILS NFR BLD AUTO: 1 %
BILIRUB SERPL-MCNC: 0.6 MG/DL (ref 0–1.2)
BUN SERPL-MCNC: 22 MG/DL (ref 8–27)
BUN/CREAT SERPL: 20 (ref 10–24)
CALCIUM SERPL-MCNC: 9.4 MG/DL (ref 8.6–10.2)
CHLORIDE SERPL-SCNC: 104 MMOL/L (ref 96–106)
CHOLEST SERPL-MCNC: 153 MG/DL (ref 100–199)
CO2 SERPL-SCNC: 23 MMOL/L (ref 20–29)
CREAT SERPL-MCNC: 1.08 MG/DL (ref 0.76–1.27)
EOSINOPHIL # BLD AUTO: 0.2 X10E3/UL (ref 0–0.4)
EOSINOPHIL NFR BLD AUTO: 5 %
ERYTHROCYTE [DISTWIDTH] IN BLOOD BY AUTOMATED COUNT: 15.4 % (ref 12.3–15.4)
EST. AVERAGE GLUCOSE BLD GHB EST-MCNC: 105 MG/DL
GLOBULIN SER CALC-MCNC: 3 G/DL (ref 1.5–4.5)
GLUCOSE SERPL-MCNC: 112 MG/DL (ref 65–99)
HBA1C MFR BLD: 5.3 % (ref 4.8–5.6)
HCT VFR BLD AUTO: 33.6 % (ref 37.5–51)
HDLC SERPL-MCNC: 37 MG/DL
HGB BLD-MCNC: 11.7 G/DL (ref 13–17.7)
IMM GRANULOCYTES # BLD AUTO: 0.1 X10E3/UL (ref 0–0.1)
IMM GRANULOCYTES NFR BLD AUTO: 1 %
INTERPRETATION, 910389: NORMAL
LDLC SERPL CALC-MCNC: 96 MG/DL (ref 0–99)
LYMPHOCYTES # BLD AUTO: 1.3 X10E3/UL (ref 0.7–3.1)
LYMPHOCYTES NFR BLD AUTO: 27 %
MCH RBC QN AUTO: 32.2 PG (ref 26.6–33)
MCHC RBC AUTO-ENTMCNC: 34.8 G/DL (ref 31.5–35.7)
MCV RBC AUTO: 93 FL (ref 79–97)
MONOCYTES # BLD AUTO: 0.4 X10E3/UL (ref 0.1–0.9)
MONOCYTES NFR BLD AUTO: 8 %
MORPHOLOGY BLD-IMP: ABNORMAL
NEUTROPHILS # BLD AUTO: 2.8 X10E3/UL (ref 1.4–7)
NEUTROPHILS NFR BLD AUTO: 58 %
PLATELET # BLD AUTO: 70 X10E3/UL (ref 150–450)
POTASSIUM SERPL-SCNC: 4.7 MMOL/L (ref 3.5–5.2)
PROT SERPL-MCNC: 7.1 G/DL (ref 6–8.5)
RBC # BLD AUTO: 3.63 X10E6/UL (ref 4.14–5.8)
SODIUM SERPL-SCNC: 140 MMOL/L (ref 134–144)
TRIGL SERPL-MCNC: 99 MG/DL (ref 0–149)
VLDLC SERPL CALC-MCNC: 20 MG/DL (ref 5–40)
WBC # BLD AUTO: 4.7 X10E3/UL (ref 3.4–10.8)

## 2019-06-25 NOTE — PROGRESS NOTES
Please inform patient and fax results to Dr Gini Mortimer, hematology and Dr Deana Bush ( ortho)  CBC shows significant low platelets. He has h/o thrombocytopenia.  Also Hgb is low, but stable   Kidney,liver, average sugar,cholesterol all results are normal  No change in current medicines  To schedule 4 months follow up  thanks

## 2019-06-26 NOTE — PROGRESS NOTES
Outbound call to patient at [phone]. No answer no answer received name confirmed voicemail. Left message for patient to return call to office regarding recent labs.

## 2019-06-27 NOTE — PROGRESS NOTES
Outbound call to patient. Second attempt no answer left message for patient to return call to office regarding labs. Letter printed with results. Results faxed to Dr. Hung Shaw and Dr. Ilene Moralez.  Confirmation received on both faxes

## 2019-07-08 ENCOUNTER — OFFICE VISIT (OUTPATIENT)
Dept: CARDIOLOGY CLINIC | Age: 79
End: 2019-07-08

## 2019-07-08 VITALS
DIASTOLIC BLOOD PRESSURE: 62 MMHG | RESPIRATION RATE: 16 BRPM | SYSTOLIC BLOOD PRESSURE: 110 MMHG | WEIGHT: 194 LBS | HEART RATE: 78 BPM | HEIGHT: 71 IN | BODY MASS INDEX: 27.16 KG/M2 | OXYGEN SATURATION: 98 %

## 2019-07-08 DIAGNOSIS — Z01.810 PREOP CARDIOVASCULAR EXAM: ICD-10-CM

## 2019-07-08 DIAGNOSIS — I35.0 AORTIC VALVE STENOSIS, ETIOLOGY OF CARDIAC VALVE DISEASE UNSPECIFIED: Primary | ICD-10-CM

## 2019-07-08 DIAGNOSIS — Z72.0 TOBACCO ABUSE: ICD-10-CM

## 2019-07-08 DIAGNOSIS — J44.9 CHRONIC OBSTRUCTIVE PULMONARY DISEASE, UNSPECIFIED COPD TYPE (HCC): ICD-10-CM

## 2019-07-08 DIAGNOSIS — I10 BENIGN ESSENTIAL HTN: ICD-10-CM

## 2019-07-08 NOTE — LETTER
7/8/2019 11:42 PM 
 
Patient:  Lynne Hernandez YOB: 1940 Date of Visit: 7/8/2019 Dear Matt Perez MD 
[place] [id number] VIA In Basket Deyanira Barroso MD 
[address] [address] VIA In Basket Zia Darby MD 
[facility] [address] 
[address] [address] VIA In Basket 
 : 
Mr. Nataliya Davis is 68 yo M with mild to moderate aortic stenosis, RBBB, COPD, HTN. Echo 2018 with normal LV function with moderate aortic stenosis, mean gradient of 24 mmHg. This is slightly increased from 17 mmHg in 2016. H/o bladder cancer and follows Dr Ely Wu s/p MAURICE. Followed by hematology for chronic thrombocytopenia. He is here for preop cardiac evaluation prior to hip surgery with Dr. Piper Sorto at [facility]. From a symptom standpoint, he denies any chest pain and his breathing has been stable. No significant palpitations, lightheadedness or dizziness. He is also being checked by hematology given his anemia and thrombocytopenia. He is compensated on exam with clear lungs. He does have a II-III/VI systolic murmur at the left upper sternal border overall unchanged. His echocardiogram last year demonstrated moderate aortic stenosis with a mean gradient of 24 mmHg. He is working on cutting back on tobacco use. Assessment and Plan: 1. Preop cardiac evaluation. He is stable cardiac wise and low risk for cardiac complications. No additional cardiac evaluation is indicated prior to surgery and will send a clearance report to his primary care physician and to his surgeon, Dr. Jim Esquivel. 2. Moderate aortic stenosis. He is asymptomatic with this and will proceed with his plan for repeat echocardiogram and follow up in December. The echo does not need to be done prior to his surgery. His physical exam today is still consistent with aortic stenosis in the moderate range. 3. Essential hypertension. Blood pressure is controlled. 4. Tobacco use. Encouraged cessation. 5. COPD. If you have questions, please do not hesitate to call me. Sincerely, Yuong Pelletier MD

## 2019-07-18 ENCOUNTER — TELEPHONE (OUTPATIENT)
Dept: CARDIOLOGY CLINIC | Age: 79
End: 2019-07-18

## 2019-07-18 NOTE — TELEPHONE ENCOUNTER
Pt called stating he needs information sent to Dr. Capone Console. He didn't state what information and he provided the fax number.   Fax #[phone]  Pt phone #[phone]  Thanks

## 2019-08-15 DIAGNOSIS — Z79.4 TYPE 2 DIABETES MELLITUS WITH DIABETIC NEUROPATHY, WITH LONG-TERM CURRENT USE OF INSULIN (HCC): ICD-10-CM

## 2019-08-15 DIAGNOSIS — E11.40 TYPE 2 DIABETES MELLITUS WITH DIABETIC NEUROPATHY, WITH LONG-TERM CURRENT USE OF INSULIN (HCC): ICD-10-CM

## 2019-08-15 RX ORDER — NAPROXEN SODIUM 220 MG
TABLET ORAL
Qty: 100 SYRINGE | Refills: 0 | Status: SHIPPED | OUTPATIENT
Start: 2019-08-15 | End: 2019-10-15 | Stop reason: SDUPTHER

## 2019-08-21 ENCOUNTER — TELEPHONE (OUTPATIENT)
Dept: FAMILY MEDICINE CLINIC | Age: 79
End: 2019-08-21

## 2019-08-21 RX ORDER — ONDANSETRON 8 MG/1
8 TABLET, ORALLY DISINTEGRATING ORAL
Qty: 20 TAB | Refills: 0 | Status: SHIPPED | OUTPATIENT
Start: 2019-08-21 | End: 2019-12-04 | Stop reason: ALTCHOICE

## 2019-08-21 NOTE — TELEPHONE ENCOUNTER
Called back to patient.  answered. Patient was sleeping as he had recent THR and he was exhausted after his PT today. As per , she did call office about concern of hiccup, that started today morning. Patient also spoke to Physical therapist who contacted ortho surgeon and patient was reassured that it is likely from anesthesia effect. I informed  that ,I am sending Rx of Ondansetron to pharmacy. If hiccup doesn't get better,patient needs to go to ER for further evaluation ( renal and liver function,ammonia check)   verbalized my instruction.

## 2019-08-21 NOTE — TELEPHONE ENCOUNTER
Pt is calling because he has the hick up and it is not going away. Pt stated that he drank water, tried sugar, and gas x, tums.        Best Contact # [phone]    LOV 06/24/19    [facility] #[id number] - [address] [facility] [address] AT [address] [place] [address]  [phone]

## 2019-10-15 DIAGNOSIS — E11.40 TYPE 2 DIABETES MELLITUS WITH DIABETIC NEUROPATHY, WITH LONG-TERM CURRENT USE OF INSULIN (HCC): ICD-10-CM

## 2019-10-15 DIAGNOSIS — Z79.4 TYPE 2 DIABETES MELLITUS WITH DIABETIC NEUROPATHY, WITH LONG-TERM CURRENT USE OF INSULIN (HCC): ICD-10-CM

## 2019-10-15 RX ORDER — NAPROXEN SODIUM 220 MG
TABLET ORAL
Qty: 100 SYRINGE | Refills: 0 | Status: SHIPPED | OUTPATIENT
Start: 2019-10-15 | End: 2019-12-12 | Stop reason: SDUPTHER

## 2019-10-15 RX ORDER — TAMSULOSIN HYDROCHLORIDE 0.4 MG/1
CAPSULE ORAL
Qty: 90 CAP | Refills: 0 | Status: SHIPPED | OUTPATIENT
Start: 2019-10-15 | End: 2020-02-13 | Stop reason: SDUPTHER

## 2019-10-15 NOTE — TELEPHONE ENCOUNTER
Needs 4 months follow up.  He follow urology for his prostate cancer, not sure, why pharmacy sending his Flomax refills to me

## 2019-11-05 NOTE — TELEPHONE ENCOUNTER
Call placed to patient. No answer left message on name confirmed voicemail for patient to call office to schedule appt.

## 2019-12-04 ENCOUNTER — OFFICE VISIT (OUTPATIENT)
Dept: CARDIOLOGY CLINIC | Age: 79
End: 2019-12-04

## 2019-12-04 VITALS
DIASTOLIC BLOOD PRESSURE: 60 MMHG | BODY MASS INDEX: 27.86 KG/M2 | RESPIRATION RATE: 13 BRPM | OXYGEN SATURATION: 97 % | HEART RATE: 63 BPM | SYSTOLIC BLOOD PRESSURE: 140 MMHG | WEIGHT: 199 LBS | HEIGHT: 71 IN

## 2019-12-04 DIAGNOSIS — Z72.0 TOBACCO USE: ICD-10-CM

## 2019-12-04 DIAGNOSIS — J44.9 CHRONIC OBSTRUCTIVE PULMONARY DISEASE, UNSPECIFIED COPD TYPE (HCC): ICD-10-CM

## 2019-12-04 DIAGNOSIS — I35.0 MODERATE AORTIC STENOSIS: Primary | ICD-10-CM

## 2019-12-04 DIAGNOSIS — I10 BENIGN ESSENTIAL HTN: ICD-10-CM

## 2019-12-04 NOTE — PROGRESS NOTES
[facility] [place]: [place] [facility]  [phone]    History of Present Illness:   Mr. Jose Medellin is 70 yo M with mild to moderate aortic stenosis, RBBB, COPD, HTN. Echo 2018 with normal LV function with moderate aortic stenosis, mean gradient of 24 mmHg. This is slightly increased from 17 mmHg in 2016. H/o bladder cancer and follows Dr Harper Done s/p TURP. Followed by hematology for chronic thrombocytopenia. He is recovering from this well. He does continue to try to stop with his tobacco and stopped for 25 days, but then relapsed. He denies any chest pain. His breathing has been stable. No significant palpitations. His echocardiogram today was overall unchanged with moderate aortic stenosis, mean gradient of 29 mmHg (slightly increased from 24 mmHg). He is compensated on exam with clear lungs. He does have a II-III/VI systolic murmur at the left sternal border overall unchanged. Soc hx. ; his wife is followed by Dr. Tami Eric and Plan:    1. Moderate aortic stenosis. Continues asymptomatic. His echocardiogram was overall unchanged. At this point, will have him follow up with a same day echocardiogram in one year. 2. Essential hypertension. Blood pressure is controlled. 3. Tobacco use. Encouraged cessation. 4. COPD. He  has a past medical history of COPD (chronic obstructive pulmonary disease) ([address] Diabetes ([address] Enlarged prostate, Moderate aortic stenosis, and Pneumonia. All other systems negative except as above. PE  Vitals:    12/04/19 1435   BP: 140/60   Pulse: 63   Resp: 13   SpO2: 97%   Weight: 199 lb (90.3 kg)   Height: 5' 11\" (1.803 m)    Body mass index is 27.75 kg/m².    General appearance - alert, well appearing, and in no distress  Mental status - affect appropriate to mood  Eyes - sclera anicteric, moist mucous membranes  Neck - supple, no JVD  Chest - clear to auscultation, no wheezes, rales or rhonchi  Heart - normal rate, regular rhythm, normal S1, S2, II-III/IV systolic murmur LUSB  Abdomen - soft, nontender, nondistended, no masses or organomegaly  Extremities - peripheral pulses normal, no pedal edema    Recent Labs:  Lab Results   Component Value Date/Time    Cholesterol, total 153 06/24/2019 10:33 AM    HDL Cholesterol 37 (L) 06/24/2019 10:33 AM    LDL, calculated 96 06/24/2019 10:33 AM    Triglyceride 99 06/24/2019 10:33 AM     Lab Results   Component Value Date/Time    Creatinine 1.08 06/24/2019 10:33 AM     Lab Results   Component Value Date/Time    BUN 22 06/24/2019 10:33 AM     Lab Results   Component Value Date/Time    Potassium 4.7 06/24/2019 10:33 AM     Lab Results   Component Value Date/Time    Hemoglobin A1c 5.3 06/24/2019 10:33 AM     Lab Results   Component Value Date/Time    HGB 11.7 (L) 06/24/2019 10:33 AM     Lab Results   Component Value Date/Time    PLATELET 70 (LL) 63/78/6665 10:33 AM       Reviewed:  Past Medical History:   Diagnosis Date    [facility] disease) [facility]     Diabetes ([facility]     Enlarged prostate     Moderate aortic stenosis     echo June 2016 Will Buoy     Pneumonia      Social History     Tobacco Use   Smoking Status Current Every Day Smoker    Packs/day: 0.50   Smokeless Tobacco Never Used   Tobacco Comment    1 pack a day  ( 16-18 ) a day      Social History     Substance and Sexual Activity   Alcohol Use Yes    Alcohol/week: 14.0 standard drinks    Types: 14 Cans of beer per week    Comment: Social      No Known Allergies    Current Outpatient Medications   Medication Sig    gabapentin (NEURONTIN) 300 mg capsule TAKE 1 CAPSULE BY MOUTH EVERY NIGHT    Insulin Syringe-Needle U-100 1/2 mL 30 gauge syrg USE TO INJECT 10 UNITS UNDER THE SKIN TWICE DAILY    tamsulosin (FLOMAX) 0.4 mg capsule TAKE 1 CAPSULE BY MOUTH DAILY    glucose blood VI test strips (LIBRADO BLOOD GLUCOSE TEST STRIP) strip USE TO TEST TWICE DAILY    potassium chloride (K-DUR, KLOR-CON) 20 mEq tablet TAKE 1 TABLET BY MOUTH EVERY DAY    insulin NPH/insulin regular (HUMULIN 70/30 U-100 INSULIN) 100 unit/mL (70-30) injection INJECT 10 UNITS UNDER THE SKIN BEFORE BREAKFAST AND DINNER. DISCARD AFTER 28 DAYS FROM FIRST USE (Patient taking differently: 8 Units Daily (before breakfast). INJECT 10 UNITS UNDER THE SKIN BEFORE BREAKFAST AND DINNER. DISCARD AFTER 28 DAYS FROM FIRST USE)    metFORMIN ER (GLUCOPHAGE XR) 500 mg tablet TAKE 1 TABLET BY MOUTH DAILY WITH DINNER (Patient taking differently: TAKE 1 TABLET BY MOUTH DAILY WITH Rufus Estelle Horner forgets to take it)    ONETOUCH ULTRA TEST strip USE TO TEST TWICE DAILY    multivit with minerals/lutein (MULTIVITAMIN 50 PLUS PO) Take  by mouth.  albuterol (PROAIR HFA) 90 mcg/actuation inhaler Take 1 Puff by inhalation.  mometasone-formoterol (DULERA) 100-5 mcg/actuation HFA inhaler INL 2 PFS PO BID    furosemide (LASIX) 40 mg tablet TAKE 1 TABLET BY MOUTH DAILY    VESICARE 10 mg tablet TK 1 T PO QD     No current facility-administered medications for this visit.         Desean Altamirano MD  [facility]  [phone] [facility] [address]  [address]

## 2019-12-12 DIAGNOSIS — E11.40 TYPE 2 DIABETES MELLITUS WITH DIABETIC NEUROPATHY, WITH LONG-TERM CURRENT USE OF INSULIN (HCC): ICD-10-CM

## 2019-12-12 DIAGNOSIS — Z79.4 TYPE 2 DIABETES MELLITUS WITH DIABETIC NEUROPATHY, WITH LONG-TERM CURRENT USE OF INSULIN (HCC): ICD-10-CM

## 2019-12-12 RX ORDER — NAPROXEN SODIUM 220 MG
TABLET ORAL
Qty: 100 SYRINGE | Refills: 0 | Status: SHIPPED | OUTPATIENT
Start: 2019-12-12 | End: 2020-02-06 | Stop reason: SDUPTHER

## 2019-12-12 NOTE — TELEPHONE ENCOUNTER
He needs to be seen due to h/o diabetes, and he is on Insulin. Last seen 06/2019. He needs 4 months follow up.  Please call him to make an appointment  thanks

## 2019-12-20 NOTE — TELEPHONE ENCOUNTER
Call placed to patient. No answer left message on name confirmed voicemail that patient is due for an office visit. Advised to contact office to schedule.

## 2020-02-06 DIAGNOSIS — E11.40 TYPE 2 DIABETES MELLITUS WITH DIABETIC NEUROPATHY, WITH LONG-TERM CURRENT USE OF INSULIN (HCC): ICD-10-CM

## 2020-02-06 DIAGNOSIS — Z79.4 TYPE 2 DIABETES MELLITUS WITH DIABETIC NEUROPATHY, WITH LONG-TERM CURRENT USE OF INSULIN (HCC): ICD-10-CM

## 2020-02-06 RX ORDER — NAPROXEN SODIUM 220 MG
TABLET ORAL
Qty: 100 SYRINGE | Refills: 0 | Status: SHIPPED | OUTPATIENT
Start: 2020-02-06 | End: 2020-03-22 | Stop reason: SDUPTHER

## 2020-02-06 NOTE — TELEPHONE ENCOUNTER
Patient is requesting Rx be sent to pharmacy, he has two needles left and he uses twice a day.   .  Requested Prescriptions     Pending Prescriptions Disp Refills    Insulin Syringe-Needle U-100 1/2 mL 30 gauge syrg 100 Syringe 0     Pharmacy verified   Last refill : 12/12/2019  LOV : June 24, 2019 09:20 AM  UOV: Thursday, February 13, 2020 11:00 AM

## 2020-02-13 ENCOUNTER — OFFICE VISIT (OUTPATIENT)
Dept: FAMILY MEDICINE CLINIC | Age: 80
End: 2020-02-13

## 2020-02-13 VITALS
HEIGHT: 71 IN | WEIGHT: 199 LBS | BODY MASS INDEX: 27.86 KG/M2 | RESPIRATION RATE: 18 BRPM | HEART RATE: 71 BPM | DIASTOLIC BLOOD PRESSURE: 71 MMHG | OXYGEN SATURATION: 95 % | TEMPERATURE: 98.1 F | SYSTOLIC BLOOD PRESSURE: 122 MMHG

## 2020-02-13 DIAGNOSIS — Z12.5 SPECIAL SCREENING FOR MALIGNANT NEOPLASM OF PROSTATE: ICD-10-CM

## 2020-02-13 DIAGNOSIS — R60.9 EDEMA, UNSPECIFIED TYPE: ICD-10-CM

## 2020-02-13 DIAGNOSIS — I10 BENIGN ESSENTIAL HTN: ICD-10-CM

## 2020-02-13 DIAGNOSIS — E11.40 TYPE 2 DIABETES MELLITUS WITH DIABETIC NEUROPATHY, WITH LONG-TERM CURRENT USE OF INSULIN (HCC): ICD-10-CM

## 2020-02-13 DIAGNOSIS — D69.6 THROMBOCYTOPENIA (HCC): ICD-10-CM

## 2020-02-13 DIAGNOSIS — Z71.89 ADVANCED DIRECTIVES, COUNSELING/DISCUSSION: ICD-10-CM

## 2020-02-13 DIAGNOSIS — R35.0 BENIGN PROSTATIC HYPERPLASIA WITH URINARY FREQUENCY: ICD-10-CM

## 2020-02-13 DIAGNOSIS — F17.210 SMOKING GREATER THAN 20 PACK YEARS: ICD-10-CM

## 2020-02-13 DIAGNOSIS — J43.9 PULMONARY EMPHYSEMA, UNSPECIFIED EMPHYSEMA TYPE (HCC): ICD-10-CM

## 2020-02-13 DIAGNOSIS — Z13.31 SCREENING FOR DEPRESSION: ICD-10-CM

## 2020-02-13 DIAGNOSIS — I35.0 NONRHEUMATIC AORTIC VALVE STENOSIS: ICD-10-CM

## 2020-02-13 DIAGNOSIS — Z13.39 SCREENING FOR ALCOHOLISM: ICD-10-CM

## 2020-02-13 DIAGNOSIS — C67.3 MALIGNANT NEOPLASM OF ANTERIOR WALL OF URINARY BLADDER (HCC): ICD-10-CM

## 2020-02-13 DIAGNOSIS — Z79.4 TYPE 2 DIABETES MELLITUS WITH DIABETIC NEUROPATHY, WITH LONG-TERM CURRENT USE OF INSULIN (HCC): ICD-10-CM

## 2020-02-13 DIAGNOSIS — Z72.0 TOBACCO ABUSE: ICD-10-CM

## 2020-02-13 DIAGNOSIS — N40.1 BENIGN PROSTATIC HYPERPLASIA WITH URINARY FREQUENCY: ICD-10-CM

## 2020-02-13 DIAGNOSIS — E66.3 OVER WEIGHT: ICD-10-CM

## 2020-02-13 DIAGNOSIS — Z00.00 MEDICARE ANNUAL WELLNESS VISIT, SUBSEQUENT: Primary | ICD-10-CM

## 2020-02-13 PROBLEM — N40.2 PROSTATE NODULE: Status: ACTIVE | Noted: 2019-04-24

## 2020-02-13 PROBLEM — C67.9 MALIGNANT NEOPLASM OF URINARY BLADDER (HCC): Status: ACTIVE | Noted: 2019-04-24

## 2020-02-13 RX ORDER — METFORMIN HYDROCHLORIDE 500 MG/1
TABLET, EXTENDED RELEASE ORAL
Qty: 90 TAB | Refills: 1 | Status: SHIPPED | OUTPATIENT
Start: 2020-02-13 | End: 2021-03-08 | Stop reason: SDUPTHER

## 2020-02-13 RX ORDER — POTASSIUM CHLORIDE 20 MEQ/1
TABLET, EXTENDED RELEASE ORAL
Qty: 90 TAB | Refills: 3 | Status: SHIPPED | OUTPATIENT
Start: 2020-02-13 | End: 2021-03-08 | Stop reason: SDUPTHER

## 2020-02-13 RX ORDER — FUROSEMIDE 40 MG/1
TABLET ORAL
Qty: 90 TAB | Refills: 1 | Status: SHIPPED | OUTPATIENT
Start: 2020-02-13 | End: 2020-09-10

## 2020-02-13 RX ORDER — TAMSULOSIN HYDROCHLORIDE 0.4 MG/1
CAPSULE ORAL
Qty: 90 CAP | Refills: 0 | Status: SHIPPED | OUTPATIENT
Start: 2020-02-13 | End: 2020-08-11

## 2020-02-13 RX ORDER — IBUPROFEN 200 MG
1 TABLET ORAL EVERY 24 HOURS
Qty: 30 PATCH | Refills: 0 | Status: SHIPPED | OUTPATIENT
Start: 2020-02-13 | End: 2020-03-14

## 2020-02-13 NOTE — PROGRESS NOTES
This is the Subsequent Medicare Annual Wellness Exam, performed 12 months or more after the Initial AWV or the last Subsequent AWV    I have reviewed the patient's medical history in detail and updated the computerized patient record. We did a Medicare Wellness evaluation including a review of past, family, and social histories with attention to age/sex appropriate screening, risk assessment, preventive care and counseling. Any cognitive, fall risk, or ADL issues identified are addressed separately. A patient specific preventive care plan was provided and appropriate screening tests were ordered as specified. Patient not seen in office for more than 6 months. He had right THR by Dr Zenobia Fraire in 08/2019. Following Dr Mendel Lindsay, urology for his bladder cancer and as per last summary, he is cancer free.      Still smoking 20 cig/day    History     Patient Active Problem List   Diagnosis Code    Edema R60.9    Type 2 diabetes mellitus with diabetic neuropathy, with long-term current use of insulin (HCC) E11.40, Z79.4    Pulmonary emphysema ([address] J43.9    Benign prostatic hyperplasia N40.0    Thrombocytopenia (HCC) D69.6    Tobacco abuse Z72.0    Nonrheumatic aortic valve stenosis I35.0    Arthritis of right hip M16.11    History of bladder cancer Z85.51    Benign essential HTN I10    Malignant neoplasm of urinary bladder (HCC) C67.9    Prostate nodule N40.2    Over weight E66.3     Past Medical History:   Diagnosis Date    COPD (chronic obstructive pulmonary disease) ([address]     Diabetes ([address]     Enlarged prostate     Moderate aortic stenosis     echo June 2016 Diony     Pneumonia       Past Surgical History:   Procedure Laterality Date    ABDOMEN SURGERY PROC UNLISTED      hernia repair    HX GI      cholecystectomy    HX RETINAL DETACHMENT REPAIR      HX UROLOGICAL      bladder surgery     Current Outpatient Medications   Medication Sig Dispense Refill    insulin NPH/insulin regular (HUMULIN 70/30 U-100 INSULIN) 100 unit/mL (70-30) injection INJECT 10 UNITS UNDER THE SKIN BEFORE BREAKFAST AND DINNER. DISCARD AFTER 28 DAYS FROM FIRST USE 30 mL 1    tamsulosin (FLOMAX) 0.4 mg capsule TAKE 1 CAPSULE BY MOUTH DAILY 90 Cap 0    furosemide (LASIX) 40 mg tablet TAKE 1 TABLET BY MOUTH DAILY 90 Tab 1    potassium chloride (K-DUR, KLOR-CON) 20 mEq tablet TAKE 1 TABLET BY MOUTH EVERY DAY 90 Tab 3    metFORMIN ER (GLUCOPHAGE XR) 500 mg tablet TAKE 1 TABLET BY MOUTH DAILY WITH DINNER 90 Tab 1    nicotine (NICODERM CQ) 14 mg/24 hr patch 1 Patch by TransDERmal route every twenty-four (24) hours for 30 days. 30 Patch 0    Insulin Syringe-Needle U-100 1/2 mL 30 gauge syrg USE TO INJECT 10 UNITS UNDER THE SKIN TWICE DAILY 100 Syringe 0    gabapentin (NEURONTIN) 300 mg capsule TAKE 1 CAPSULE BY MOUTH EVERY NIGHT 30 Cap 0    glucose blood VI test strips (LIBRADO BLOOD GLUCOSE TEST STRIP) strip USE TO TEST TWICE DAILY      multivit with minerals/lutein (MULTIVITAMIN 50 PLUS PO) Take  by mouth.  albuterol (PROAIR HFA) 90 mcg/actuation inhaler Take 1 Puff by inhalation.  mometasone-formoterol (DULERA) 100-5 mcg/actuation HFA inhaler INL 2 PFS PO BID      ONETOUCH ULTRA TEST strip USE TO TEST TWICE DAILY 100 Strip 0     No Known Allergies    Family History   Problem Relation Age of Onset    Diabetes Mother     Diabetes Father         had angina    Diabetes Paternal Grandmother      Social History     Tobacco Use    Smoking status: Current Every Day Smoker     Packs/day: 0.50    Smokeless tobacco: Never Used    Tobacco comment: 1 pack a day  ( 16-18 ) a day    Substance Use Topics    Alcohol use:  Yes     Alcohol/week: 14.0 standard drinks     Types: 14 Cans of beer per week     Comment: Social        Depression Risk Factor Screening:     3 most recent PHQ Screens 2/13/2020   Little interest or pleasure in doing things Not at all   Feeling down, depressed, irritable, or hopeless Not at all   Total Score PHQ 2 0       Alcohol Risk Factor Screening (MALE > 65): Do you average more 1 drink per night or more than 7 drinks a week: Yes he drinks 1 beer/night    In the past three months have you have had more than 4 drinks containing alcohol on one occasion: No      Functional Ability and Level of Safety:   Hearing: Hearing is good. Activities of Daily Living: The home contains: handrails and grab bars  Patient does total self care    Ambulation: with no difficulty  After his hip surgery, his balance has improved significantly. Fall Risk:  Fall Risk Assessment, last 12 mths 2/13/2020   Able to walk? Yes   Fall in past 12 months? No   Fall with injury? -   Number of falls in past 12 months -   Fall Risk Score -       Abuse Screen:  Patient is not abused    Cognitive Screening   Has your family/caregiver stated any concerns about your memory: no  Cognitive Screening: Normal - MMSE (Mini Mental Status Exam)  26/30  Oriented x 5 to time and place. 0/3 word recalls. Missed 1/5 spell back WORLD     Smoking discussion    Boubacar Rooney is an 67 y.o.  male seen for discussion regarding smoking cessation. He began smoking 50 years ago. He currently smokes 1 packs per day. He has not attempted to quit smoking in the past, but when he had hip surgery, he did not smoke for 25 days. Barriers to quitting include feels under lots of stress just now and fear of worsening his anxiety related to health. He denies constant cough, cough after eating, difficulty breathing, dry cough, productive cough, tightness in chest and wheezing.         Patient Care Team   Patient Care Team:  Sally Christiansen MD as PCP - General (Family Practice)  Sally Christiansen MD as PCP - REHABILITATION [facility] Empaneled Provider  Natasha Grider MD (Hematology and Oncology)  Ronan Velasquez MD (Internal Medicine)  Catrachita Jenkins MD (Cardiology)  Shamir Sutherland MD (Urology)  Ralph Hernandez MD (Orthopedic Surgery)    Assessment/Plan   Education and counseling provided:  Are appropriate based on today's review and evaluation    Diagnoses and all orders for this visit:    1. Medicare annual wellness visit, subsequent    2. Thrombocytopenia ([facility]  Assessment & Plan:  Stable, based on history, physical exam and review of pertinent labs, studies and medications; meds reconciled; continue current treatment plan., This condition is managed by Specialist.follows Dr Myah Hazel  Lab Results   Component Value Date/Time    WBC 4.7 06/24/2019 10:33 AM    HGB 11.7 06/24/2019 10:33 AM    HCT 33.6 06/24/2019 10:33 AM    PLATELET 70 81/66/0183 10:33 AM    Creatinine 1.08 06/24/2019 10:33 AM    BUN 22 06/24/2019 10:33 AM    Potassium 4.7 06/24/2019 10:33 AM       Orders:  -     CBC WITH AUTOMATED DIFF    3. Pulmonary emphysema, unspecified emphysema type ([facility]  Assessment & Plan:  Well Controlled, based on history, physical exam and review of pertinent labs, studies and medications; meds reconciled; continue current treatment plan, lifestyle modifications recommended, medication compliance emphasized, strongly recommended to stop smoking. Key COPD Medications             albuterol (PROAIR HFA) 90 mcg/actuation inhaler (Taking) Take 1 Puff by inhalation. mometasone-formoterol (DULERA) 100-5 mcg/actuation HFA inhaler (Taking) INL 2 PFS PO BID        Lab Results   Component Value Date/Time    WBC 4.7 06/24/2019 10:33 AM    HGB 11.7 06/24/2019 10:33 AM    HCT 33.6 06/24/2019 10:33 AM    PLATELET 70 33/89/3013 10:33 AM       Orders:  -     CBC WITH AUTOMATED DIFF    4. 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  [address]     Patient is on no Oncologic meds.         Key Pain Meds     The patient is on no pain meds.        Lab Results   Component Value Date/Time    WBC 4.7 06/24/2019 10:33 AM    ABS. NEUTROPHILS 2.8 06/24/2019 10:33 AM    HGB 11.7 06/24/2019 10:33 AM    HCT 33.6 06/24/2019 10:33 AM    PLATELET 70 02/18/9801 10:33 AM    Creatinine 1.08 06/24/2019 10:33 AM    BUN 22 06/24/2019 10:33 AM    ALT (SGPT) 13 06/24/2019 10:33 AM    AST (SGOT) 14 06/24/2019 10:33 AM    Albumin 4.1 06/24/2019 10:33 AM       Orders:  -     URINALYSIS W/ RFLX MICROSCOPIC    5. Tobacco abuse  -     nicotine (NICODERM CQ) 14 mg/24 hr patch; 1 Patch by TransDERmal route every twenty-four (24) hours for 30 days. 6. Nonrheumatic aortic valve stenosis  -     furosemide (LASIX) 40 mg tablet; TAKE 1 TABLET BY MOUTH DAILY    7. Benign prostatic hyperplasia with urinary frequency  -     tamsulosin (FLOMAX) 0.4 mg capsule; TAKE 1 CAPSULE BY MOUTH DAILY  -     PSA SCREENING (SCREENING)    8. Benign essential HTN  -     METABOLIC PANEL, COMPREHENSIVE    9. Type 2 diabetes mellitus with diabetic neuropathy, with long-term current use of insulin ([facility]  Assessment & Plan:  Well Controlled, based on history, physical exam and review of pertinent labs, studies and medications; meds reconciled; continue current treatment plan, lifestyle modifications recommended, medication compliance emphasized. Key Antihyperglycemic Medications             insulin NPH/insulin regular (HUMULIN 70/30 U-100 INSULIN) 100 unit/mL (70-30) injection (Taking) INJECT 10 UNITS UNDER THE SKIN BEFORE BREAKFAST AND DINNER.  DISCARD AFTER 28 DAYS FROM FIRST USE    metFORMIN ER (GLUCOPHAGE XR) 500 mg tablet (Taking) TAKE 1 TABLET BY MOUTH DAILY WITH DINNER        Other Key Diabetic Medications             gabapentin (NEURONTIN) 300 mg capsule (Taking) TAKE 1 CAPSULE BY MOUTH EVERY NIGHT        Lab Results   Component Value Date/Time    Hemoglobin A1c 5.3 06/24/2019 10:33 AM    Glucose 112 06/24/2019 10:33 AM    Creatinine 1.08 06/24/2019 10:33 AM    Microalb/Creat ratio (ug/mg creat.) 125.9 03/12/2019 10:18 AM    Cholesterol, total 153 06/24/2019 10:33 AM    HDL Cholesterol 37 06/24/2019 10:33 AM    LDL, calculated 96 06/24/2019 10:33 AM    Triglyceride 99 06/24/2019 10:33 AM     Diabetic Foot and Eye Exam HM Status   Topic Date Due    Diabetic Foot Care  06/24/2020       Orders:  -     insulin NPH/insulin regular (HUMULIN 70/30 U-100 INSULIN) 100 unit/mL (70-30) injection; INJECT 10 UNITS UNDER THE SKIN BEFORE BREAKFAST AND DINNER. DISCARD AFTER 28 DAYS FROM FIRST USE  -     metFORMIN ER (GLUCOPHAGE XR) 500 mg tablet; TAKE 1 TABLET BY MOUTH DAILY WITH DINNER  -     METABOLIC PANEL, COMPREHENSIVE  -     HEMOGLOBIN A1C WITH EAG  -     LIPID PANEL  -     MICROALBUMIN, UR, RAND W/ MICROALB/CREAT RATIO    10. Edema, unspecified type  -     furosemide (LASIX) 40 mg tablet; TAKE 1 TABLET BY MOUTH DAILY  -     potassium chloride (K-DUR, KLOR-CON) 20 mEq tablet; TAKE 1 TABLET BY MOUTH EVERY DAY  -     METABOLIC PANEL, COMPREHENSIVE    11. Advanced directives, counseling/discussion  -     ADVANCE CARE PLANNING FIRST 30 MINS    12. Screening for alcoholism  -     [place] ANNUAL ALCOHOL SCREEN 15 MIN    13. Screening for depression  -     DEPRESSION SCREEN ANNUAL    14. Special screening for malignant neoplasm of prostate  -     PSA SCREENING (SCREENING)    15. Over weight    16. Smoking greater than 20 pack years  -     nicotine (NICODERM CQ) 14 mg/24 hr patch; 1 Patch by TransDERmal route every twenty-four (24) hours for 30 days.  -     SMOKING AND TOBACCO USE CESSATION 3 - 10 MINUTES    Smoking counseling  I advised patient to quit, and offered support. Discussed current use pattern.   Nicotine patches beginning at 14 mg.  FU with PCP in 4 months or PRN        Health Maintenance Due   Topic Date Due    Medicare Yearly Exam  11/09/2019    GLAUCOMA SCREENING Q2Y  01/08/2020    MICROALBUMIN Q1  03/12/2020     Discussed lifestyle issues and health guidance given  Patient was given an after visit summary which includes diagnoses, vital signs, current medications, instructions and references & authorized prescriptions . Results of labs will be conveyed to patient, once available. Pt verbalized instructions I provided and expressed understanding of discussion that was held today. Follow-up and Dispositions    · Return in about 4 months (around 6/13/2020) for fasting, follow up.

## 2020-02-13 NOTE — PATIENT INSTRUCTIONS
Medicare Wellness Visit, Male The best way to live healthy is to have a lifestyle where you eat a well-balanced diet, exercise regularly, limit alcohol use, and quit all forms of tobacco/nicotine, if applicable. Regular preventive services are another way to keep healthy. Preventive services (vaccines, screening tests, monitoring & exams) can help personalize your care plan, which helps you manage your own care. Screening tests can find health problems at the earliest stages, when they are easiest to treat. [facility] follows the current, evidence-based guidelines published by the [facility] Marck Santana ([facility]) when recommending preventive services for our patients. Because we follow these guidelines, sometimes recommendations change over time as research supports it. (For example, a prostate screening blood test is no longer routinely recommended for men with no symptoms). Of course, you and your doctor may decide to screen more often for some diseases, based on your risk and co-morbidities (chronic disease you are already diagnosed with). Preventive services for you include: - Medicare offers their members a free annual wellness visit, which is time for you and your primary care provider to discuss and plan for your preventive service needs. Take advantage of this benefit every year! 
-All adults over age 72 should receive the recommended pneumonia vaccines. Current USPSTF guidelines recommend a series of two vaccines for the best pneumonia protection.  
-All adults should have a flu vaccine yearly and tetanus vaccine every 10 years. 
-All adults age 48 and older should receive the shingles vaccines (series of two vaccines).       
-All adults age 38-68 who are overweight should have a diabetes screening test once every three years.  
-Other screening tests & preventive services for persons with diabetes include: an eye exam to screen for diabetic retinopathy, a kidney function test, a foot exam, and stricter control over your cholesterol.  
-Cardiovascular screening for adults with routine risk involves an electrocardiogram (ECG) at intervals determined by the provider.  
-Colorectal cancer screening should be done for adults age 54-65 with no increased risk factors for colorectal cancer. There are a number of acceptable methods of screening for this type of cancer. Each test has its own benefits and drawbacks. Discuss with your provider what is most appropriate for you during your annual wellness visit. The different tests include: colonoscopy (considered the best screening method), a fecal occult blood test, a fecal DNA test, and sigmoidoscopy. 
-All adults born between [facility] should be screened once for Hepatitis C. 
-An Abdominal Aortic Aneurysm (AAA) Screening is recommended for men age 73-68 who has ever smoked in their lifetime. Here is a list of your current Health Maintenance items (your personalized list of preventive services) with a due date: 
Health Maintenance Due Topic Date Due  
 Annual Well Visit  11/09/2019  Glaucoma Screening   01/08/2020  Albumin Urine Test  03/12/2020 Benedict Gray 1721 What is a living will? A living will is a legal form you use to write down the kind of care you want at the end of your life. It is used by the health professionals who will treat you if you aren't able to decide for yourself. If you put your wishes in writing, your loved ones and others will know what kind of care you want. They won't need to guess. This can ease your mind and be helpful to others. A living will is not the same as an estate or property will. An estate will explains what you want to happen with your money and property after you die. Is a living will a legal document? A living will is a legal document.  Each state has its own laws about living [place]. If you move to another state, make sure that your living will is legal in the state where you now live. Or you might use a universal form that has been approved by many states. This kind of form can sometimes be completed and stored online. Your electronic copy will then be available wherever you have a connection to the Internet. In most cases, doctors will respect your wishes even if you have a form from a different state. · You don't need an  to complete a living will. But legal advice can be helpful if your state's laws are unclear, your health history is complicated, or your family can't agree on what should be in your living will. · You can change your living will at any time. Some people find that their wishes about end-of-life care change as their health changes. · In addition to making a living will, think about completing a medical power of  form. This form lets you name the person you want to make end-of-life treatment decisions for you (your \"health care agent\") if you're not able to. Many hospitals and nursing homes will give you the forms you need to complete a living will and a medical power of . · Your living will is used only if you can't make or communicate decisions for yourself anymore. If you become able to make decisions again, you can accept or refuse any treatment, no matter what you wrote in your living will. · Your state may offer an online registry. This is a place where you can store your living will online so the doctors and nurses who need to treat you can find it right away. What should you think about when creating a living will? Talk about your end-of-life wishes with your family members and your doctor. Let them know what you want. That way the people making decisions for you won't be surprised by your choices. Think about these questions as you make your living will: · Do you know enough about life support methods that might be used? If not, talk to your doctor so you know what might be done if you can't breathe on your own, your heart stops, or you're unable to swallow. · What things would you still want to be able to do after you receive life-support methods? Would you want to be able to walk? To speak? To eat on your own? To live without the help of machines? · If you have a choice, where do you want to be cared for? In your home? At a hospital or nursing home? · Do you want certain Hindu practices performed if you become very ill? · If you have a choice at the end of your life, where would you prefer to die? At home? In a hospital or nursing home? Somewhere else? · Would you prefer to be buried or cremated? · Do you want your organs to be donated after you die? What should you do with your living will? · Make sure that your family members and your health care agent have copies of your living will. · Give your doctor a copy of your living will to keep in your medical record. If you have more than one doctor, make sure that each one has a copy. · You may want to put a copy of your living will where it can be easily found. Where can you learn more? Go to http://meg-zamzam.info/. Enter T279 in the search box to learn more about \"Learning About Living Nikky Casper. \" Current as of: August 8, 2016 Content Version: 11.3 © 2641-3722 [employer]. Care instructions adapted under license by [employer] (which disclaims liability or warranty for this information). If you have questions about a medical condition or this instruction, always ask your healthcare professional. [address] any warranty or liability for your use of this information. Preventing Falls: Care Instructions Your Care Instructions Getting around your home safely can be a challenge if you have injuries or health problems that make it easy for you to fall. Loose rugs and furniture in walkways are among the dangers for many older people who have problems walking or who have poor eyesight. People who have conditions such as arthritis, osteoporosis, or dementia also have to be careful not to fall. You can make your home safer with a few simple measures. Follow-up care is a key part of your treatment and safety. Be sure to make and go to all appointments, and call your doctor if you are having problems. It's also a good idea to know your test results and keep a list of the medicines you take. How can you care for yourself at home? Taking care of yourself · You may get dizzy if you do not drink enough water. To prevent dehydration, drink plenty of fluids, enough so that your urine is light yellow or clear like water. Choose water and other caffeine-free clear liquids. If you have kidney, heart, or liver disease and have to limit fluids, talk with your doctor before you increase the amount of fluids you drink. · Exercise regularly to improve your strength, muscle tone, and balance. Walk if you can. Swimming may be a good choice if you cannot walk easily. · Have your vision and hearing checked each year or any time you notice a change. If you have trouble seeing and hearing, you might not be able to avoid objects and could lose your balance. · Know the side effects of the medicines you take. Ask your doctor or pharmacist whether the medicines you take can affect your balance. Sleeping pills or sedatives can affect your balance. · Limit the amount of alcohol you drink. Alcohol can impair your balance and other senses. · Ask your doctor whether calluses or corns on your feet need to be removed. If you wear loose-fitting shoes because of calluses or corns, you can lose your balance and fall. · Talk to your doctor if you have numbness in your feet. Preventing falls at home · Remove raised doorway thresholds, throw rugs, and clutter. Repair loose carpet or raised areas in the floor. · Move furniture and electrical cords to keep them out of walking paths. · Use nonskid floor wax, and wipe up spills right away, especially on ceramic tile floors. · If you use a walker or cane, put rubber tips on it. If you use crutches, clean the bottoms of them regularly with an abrasive pad, such as steel wool. · Keep your house well lit, especially [place], and outside walkways. Use night-lights in areas such as hallways and bathrooms. Add extra light switches or use remote switches (such as switches that go on or off when you clap your hands) to make it easier to turn lights on if you have to get up during the night. · Install sturdy handrails on stairways. · Move items in your cabinets so that the things you use a lot are on the lower shelves (about waist level). · Keep a cordless phone and a flashlight with new batteries by your bed. If possible, put a phone in each of the main rooms of your house, or carry a cell phone in case you fall and cannot reach a phone. Or, you can wear a device around your neck or wrist. You push a button that sends a signal for help. · Wear low-heeled shoes that fit well and give your feet good support. Use footwear with nonskid soles. Check the heels and soles of your shoes for wear. Repair or replace worn heels or soles. · Do not wear socks without shoes on wood floors. · Walk on the grass when the sidewalks are slippery. If you live in an area that gets snow and ice in the winter, sprinkle salt on slippery steps and sidewalks. Preventing falls in the bath · Install grab bars and nonskid mats inside and outside your shower or tub and near the toilet and sinks. · Use shower chairs and bath benches. · Use a hand-held shower head that will allow you to sit while showering.  
· Get into a tub or shower by putting the weaker leg in first. Get out of a tub or shower with your strong side first. 
· Repair loose toilet seats and consider installing a raised toilet seat to make getting on and off the toilet easier. · Keep your bathroom door unlocked while you are in the shower. Where can you learn more? Go to http://meg-zamzam.info/. Enter [id number] [id number] in the search box to learn more about \"Preventing Falls: Care Instructions. \" Current as of: March 16, 2018 Content Version: 11.8 © 7293-8776 [employer]. Care instructions adapted under license by [employer] (which disclaims liability or warranty for this information). If you have questions about a medical condition or this instruction, always ask your healthcare professional. [address] any warranty or liability for your use of this information.

## 2020-02-13 NOTE — ASSESSMENT & PLAN NOTE
Well Controlled, based on history, physical exam and review of pertinent labs, studies and medications; meds reconciled; continue current treatment plan, lifestyle modifications recommended, medication compliance emphasized. Key Antihyperglycemic Medications             insulin NPH/insulin regular (HUMULIN 70/30 U-100 INSULIN) 100 unit/mL (70-30) injection (Taking) INJECT 10 UNITS UNDER THE SKIN BEFORE BREAKFAST AND DINNER.  DISCARD AFTER 28 DAYS FROM FIRST USE    metFORMIN ER (GLUCOPHAGE XR) 500 mg tablet (Taking) TAKE 1 TABLET BY MOUTH DAILY WITH DINNER        Other Key Diabetic Medications             gabapentin (NEURONTIN) 300 mg capsule (Taking) TAKE 1 CAPSULE BY MOUTH EVERY NIGHT        Lab Results   Component Value Date/Time    Hemoglobin A1c 5.3 06/24/2019 10:33 AM    Glucose 112 06/24/2019 10:33 AM    Creatinine 1.08 06/24/2019 10:33 AM    Microalb/Creat ratio (ug/mg creat.) 125.9 03/12/2019 10:18 AM    Cholesterol, total 153 06/24/2019 10:33 AM    HDL Cholesterol 37 06/24/2019 10:33 AM    LDL, calculated 96 06/24/2019 10:33 AM    Triglyceride 99 06/24/2019 10:33 AM     Diabetic Foot and Eye Exam HM Status   Topic Date Due    Diabetic Foot Care  06/24/2020

## 2020-02-13 NOTE — ASSESSMENT & PLAN NOTE
Stable, based on history, physical exam and review of pertinent labs, studies and medications; meds reconciled; continue current treatment plan., This condition is managed by Specialist.follows Dr [facility]  Lab Results   Component Value Date/Time    WBC 4.7 06/24/2019 10:33 AM    HGB 11.7 06/24/2019 10:33 AM    HCT 33.6 06/24/2019 10:33 AM    PLATELET 70 23/27/5784 10:33 AM    Creatinine 1.08 06/24/2019 10:33 AM    BUN 22 06/24/2019 10:33 AM    Potassium 4.7 06/24/2019 10:33 AM

## 2020-02-13 NOTE — ASSESSMENT & PLAN NOTE
Stable, based on history, physical exam and review of pertinent labs, studies and medications; meds reconciled; continue current treatment plan, strongly recommended to stop smoking., This condition is managed by Specialist.  Key Oncology Meds     Patient is on no Oncologic meds. Key Pain Meds     The patient is on no pain meds. Lab Results   Component Value Date/Time    WBC 4.7 06/24/2019 10:33 AM    ABS.  NEUTROPHILS 2.8 06/24/2019 10:33 AM    HGB 11.7 06/24/2019 10:33 AM    HCT 33.6 06/24/2019 10:33 AM    PLATELET 70 40/69/3960 10:33 AM    Creatinine 1.08 06/24/2019 10:33 AM    BUN 22 06/24/2019 10:33 AM    ALT (SGPT) 13 06/24/2019 10:33 AM    AST (SGOT) 14 06/24/2019 10:33 AM    Albumin 4.1 06/24/2019 10:33 AM

## 2020-02-13 NOTE — ASSESSMENT & PLAN NOTE
Well Controlled, based on history, physical exam and review of pertinent labs, studies and medications; meds reconciled; continue current treatment plan, lifestyle modifications recommended, medication compliance emphasized, strongly recommended to stop smoking. Key COPD Medications             albuterol (PROAIR HFA) 90 mcg/actuation inhaler (Taking) Take 1 Puff by inhalation.     mometasone-formoterol (DULERA) 100-5 mcg/actuation HFA inhaler (Taking) INL 2 PFS PO BID        Lab Results   Component Value Date/Time    WBC 4.7 06/24/2019 10:33 AM    HGB 11.7 06/24/2019 10:33 AM    HCT 33.6 06/24/2019 10:33 AM    PLATELET 70 63/42/2381 10:33 AM

## 2020-02-13 NOTE — PROGRESS NOTES
HISTORY OF PRESENT ILLNESS  Adrian Nicolas is a 67 y.o. male. he was seen for follow up on several issues and also for medicare wellness visit. He has h/o DM,dyslipidemia, HTN  H/o bladder cancer and follows Dr Vale Ontiveros. He is s/p TURBT ,and Bx was positive for invasive bladder cancer. He had BCG bladder instillations as per records. His hematuria has been resolved. Had right THR in 08/2019, completed PT for hip and back and now his pain is significantly better  Also follows hematology for chronic thrombocytopenia and cardiology for aortic stenosis. HPI  Endocrine Review  He is seen for diabetes.  Since last visit he reports: no polyuria or polydipsia, no chest pain, dyspnea or TIA's, no unusual visual symptoms, no hypoglycemia, no medication side effects noted, has noticed numbness both feet.  Home glucose monitoring: is performed regularly, fasting values range 140-200  He is checking his sugars 2 per day.  He reports medication compliance: compliant all of the time.  Medication side effects: none.  Diabetic diet compliance: compliant all of the time.  Lab review: labs reviewed and discussed with patient.  Eye exam: overdue.  Referral done  His sliding scale Insulin was changed to NPH 10 units bid on last visit  Is scheduled to see ophthalmology at [facility], next month  Lab Results   Component Value Date/Time    Hemoglobin A1c 5.3 06/24/2019 10:33 AM    Hemoglobin A1c (POC) 5.4 06/28/2017 11:27 AM        COPD  Patient complains of cough, fatigue and shortness of breath. Symptoms began several years ago. Symptoms chronic dyspnea: severity = mild: course of sx: well controlled  becomes dyspneic after 5 blocks  symptoms worse with exertion. does worsen with exertion. Sputum is clear in small amounts.  Patient currently is not on home oxygen therapy. Sandra Gomez Respiratory history: pneumonia, COPD and history of 20  pack years tobacco use  Follows Dr Wilfrido Funez. PFT not c/w obstructive disease.  He was advised to continue on Dulera and prn Albuterol. in spite of counseling, he is still smoking. He has not followed up with Pulmonology for a long period   As per him he takes [place] that I prescribed before and that Only taking as needed.       Review of Systems   Constitutional: Negative for chills, fever and malaise/fatigue. HENT: Negative for congestion, ear pain, sore throat and tinnitus. Eyes: Negative for blurred vision, double vision, pain and discharge. Respiratory: Negative for cough, shortness of breath and wheezing. Cardiovascular: Negative for chest pain, palpitations and leg swelling. Gastrointestinal: Negative for abdominal pain, blood in stool, constipation, diarrhea, nausea and vomiting. Genitourinary: Negative for dysuria, frequency, hematuria and urgency. Musculoskeletal: Negative for back pain, joint pain and myalgias. Skin: Negative for rash. Neurological: Negative for dizziness, tremors, seizures and headaches. Endo/Heme/Allergies: Negative for polydipsia. Does not bruise/bleed easily. Psychiatric/Behavioral: Negative for depression and substance abuse. The patient is not nervous/anxious. Physical Exam  Vitals signs and nursing note reviewed. Constitutional:       Appearance: He is well-developed. He is not diaphoretic. HENT:      Head: Normocephalic and atraumatic. Right Ear: External ear normal.      Mouth/Throat:      Pharynx: No oropharyngeal exudate. Eyes:      General: No scleral icterus. Conjunctiva/sclera: Conjunctivae normal.      Pupils: Pupils are equal, round, and reactive to light. Neck:      Musculoskeletal: Normal range of motion and neck supple. Thyroid: No thyromegaly. Vascular: No JVD. Cardiovascular:      Rate and Rhythm: Normal rate and regular rhythm. Heart sounds: Normal heart sounds. No murmur. Pulmonary:      Effort: Pulmonary effort is normal.      Breath sounds: Normal breath sounds. No wheezing.    Abdominal:      General: Bowel sounds are normal. There is no distension. Palpations: Abdomen is soft. There is no mass. Musculoskeletal:      Right hip: He exhibits normal range of motion and no tenderness. Comments: Feet:warm, good capillary refill, no trophic changes or ulcerative lesions, normal DP and PT pulses and reduced sensation at both feet. Has yellow discoloration with thickening of all toes of both feet       Lymphadenopathy:      Cervical: No cervical adenopathy. Skin:     General: Skin is warm and dry. Findings: No rash. Neurological:      Mental Status: He is alert and oriented to person, place, and time. Cranial Nerves: No cranial nerve deficit. Deep Tendon Reflexes: Reflexes are normal and symmetric. ASSESSMENT and PLAN  Diagnoses and all orders for this visit:    1. Medicare annual wellness visit, subsequent    2. Thrombocytopenia ([facility]  Assessment & Plan:  Stable, based on history, physical exam and review of pertinent labs, studies and medications; meds reconciled; continue current treatment plan., This condition is managed by Specialist.follows Dr Adelaida Wiley  Lab Results   Component Value Date/Time    WBC 4.7 06/24/2019 10:33 AM    HGB 11.7 06/24/2019 10:33 AM    HCT 33.6 06/24/2019 10:33 AM    PLATELET 70 38/91/1025 10:33 AM    Creatinine 1.08 06/24/2019 10:33 AM    BUN 22 06/24/2019 10:33 AM    Potassium 4.7 06/24/2019 10:33 AM       Orders:  -     CBC WITH AUTOMATED DIFF    3. Pulmonary emphysema, unspecified emphysema type ([facility]  Assessment & Plan:  Well Controlled, based on history, physical exam and review of pertinent labs, studies and medications; meds reconciled; continue current treatment plan, lifestyle modifications recommended, medication compliance emphasized, strongly recommended to stop smoking. Key COPD Medications             albuterol (PROAIR HFA) 90 mcg/actuation inhaler (Taking) Take 1 Puff by inhalation.     mometasone-formoterol (DULERA) 100-5 mcg/actuation HFA inhaler (Taking) INL 2 PFS PO BID        Lab Results   Component Value Date/Time    WBC 4.7 06/24/2019 10:33 AM    HGB 11.7 06/24/2019 10:33 AM    HCT 33.6 06/24/2019 10:33 AM    PLATELET 70 87/63/7961 10:33 AM       Orders:  -     CBC WITH AUTOMATED DIFF    4. 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  [address]     Patient is on no Oncologic meds. Key Pain Meds     The patient is on no pain meds. Lab Results   Component Value Date/Time    WBC 4.7 06/24/2019 10:33 AM    ABS. NEUTROPHILS 2.8 06/24/2019 10:33 AM    HGB 11.7 06/24/2019 10:33 AM    HCT 33.6 06/24/2019 10:33 AM    PLATELET 70 33/33/6089 10:33 AM    Creatinine 1.08 06/24/2019 10:33 AM    BUN 22 06/24/2019 10:33 AM    ALT (SGPT) 13 06/24/2019 10:33 AM    AST (SGOT) 14 06/24/2019 10:33 AM    Albumin 4.1 06/24/2019 10:33 AM       Orders:  -     URINALYSIS W/ RFLX MICROSCOPIC    5. Tobacco abuse  -     nicotine (NICODERM CQ) 14 mg/24 hr patch; 1 Patch by TransDERmal route every twenty-four (24) hours for 30 days. 6. Nonrheumatic aortic valve stenosis  -     furosemide (LASIX) 40 mg tablet; TAKE 1 TABLET BY MOUTH DAILY    7. Benign prostatic hyperplasia with urinary frequency  -     tamsulosin (FLOMAX) 0.4 mg capsule; TAKE 1 CAPSULE BY MOUTH DAILY  -     PSA SCREENING (SCREENING)    8. Benign essential HTN  -     METABOLIC PANEL, COMPREHENSIVE    9.  Type 2 diabetes mellitus with diabetic neuropathy, with long-term current use of insulin ([facility]  Assessment & Plan:  Well Controlled, based on history, physical exam and review of pertinent labs, studies and medications; meds reconciled; continue current treatment plan, lifestyle modifications recommended, medication compliance emphasized. Key Antihyperglycemic Medications             insulin NPH/insulin regular (HUMULIN 70/30 U-100 INSULIN) 100 unit/mL (70-30) injection (Taking) INJECT 10 UNITS UNDER THE SKIN BEFORE BREAKFAST AND DINNER. DISCARD AFTER 28 DAYS FROM FIRST USE    metFORMIN ER (GLUCOPHAGE XR) 500 mg tablet (Taking) TAKE 1 TABLET BY MOUTH DAILY WITH DINNER        Other Key Diabetic Medications             gabapentin (NEURONTIN) 300 mg capsule (Taking) TAKE 1 CAPSULE BY MOUTH EVERY NIGHT        Lab Results   Component Value Date/Time    Hemoglobin A1c 5.3 06/24/2019 10:33 AM    Glucose 112 06/24/2019 10:33 AM    Creatinine 1.08 06/24/2019 10:33 AM    Microalb/Creat ratio (ug/mg creat.) 125.9 03/12/2019 10:18 AM    Cholesterol, total 153 06/24/2019 10:33 AM    HDL Cholesterol 37 06/24/2019 10:33 AM    LDL, calculated 96 06/24/2019 10:33 AM    Triglyceride 99 06/24/2019 10:33 AM     Diabetic Foot and Eye Exam HM Status   Topic Date Due    Diabetic Foot Care  06/24/2020       Orders:  -     insulin NPH/insulin regular (HUMULIN 70/30 U-100 INSULIN) 100 unit/mL (70-30) injection; INJECT 10 UNITS UNDER THE SKIN BEFORE BREAKFAST AND DINNER. DISCARD AFTER 28 DAYS FROM FIRST USE  -     metFORMIN ER (GLUCOPHAGE XR) 500 mg tablet; TAKE 1 TABLET BY MOUTH DAILY WITH DINNER  -     METABOLIC PANEL, COMPREHENSIVE  -     HEMOGLOBIN A1C WITH EAG  -     LIPID PANEL  -     MICROALBUMIN, UR, RAND W/ MICROALB/CREAT RATIO    10. Edema, unspecified type  -     furosemide (LASIX) 40 mg tablet; TAKE 1 TABLET BY MOUTH DAILY  -     potassium chloride (K-DUR, KLOR-CON) 20 mEq tablet; TAKE 1 TABLET BY MOUTH EVERY DAY  -     METABOLIC PANEL, COMPREHENSIVE    11. Advanced directives, counseling/discussion  -     ADVANCE CARE PLANNING FIRST 30 MINS    12. Screening for alcoholism  -     NM ANNUAL ALCOHOL SCREEN 15 MIN    13.  Screening for depression  -     DEPRESSION SCREEN ANNUAL    14. Special screening for malignant neoplasm of prostate  - PSA SCREENING (SCREENING)    15. Over weight  Assessment & Plan:  Discussed abnormal weight, ideal BMI and importance of diet and exercise to loose weight. Referral for exercise program was offered. Printed information about diet and lifestyle modification provided. 16. Smoking greater than 20 pack years  -     nicotine (NICODERM CQ) 14 mg/24 hr patch; 1 Patch by TransDERmal route every twenty-four (24) hours for 30 days.  -     SMOKING AND TOBACCO USE CESSATION 3 - 10 MINUTES    Discussed lifestyle issues and health guidance given  Patient was given an after visit summary which includes diagnoses, vital signs, current medications, instructions and references & authorized prescriptions . Results of labs will be conveyed to patient, once available. Pt verbalized instructions I provided and expressed understanding of discussion that was held today. Follow-up and Dispositions    · Return in about 4 months (around 6/13/2020) for fasting, follow up.

## 2020-02-13 NOTE — PROGRESS NOTES
Chief Complaint   Patient presents with    Diabetes     Patient is in the office today for a follow up.  Hypertension     1. Have you been to the ER, urgent care clinic since your last visit? Hospitalized since your last visit? No    2. Have you seen or consulted any other health care providers outside of the [address] [place] [address] since your last visit? Include any pap smears or colon screening.  No

## 2020-02-13 NOTE — ACP (ADVANCE CARE PLANNING)
Advance Care Planning    Advance Care Planning (ACP) Provider Conversation Snapshot    Date of ACP Conversation: 02/13/20  Persons included in Conversation:  patient  Length of ACP Conversation in minutes:  16 minutes    Authorized Decision Maker (if patient is incapable of making informed decisions): son Darshan Marquis and Daughter ms Godfrey Seip  This person is: Other Legally Authorized Decision Maker (e.g. Next of Kin)            For Patients with Decision Making Capacity:   Values/Goals: Exploration of values, goals, and preferences if recovery is not expected, even with continued medical treatment in the event of:  Imminent death  Severe, permanent brain injury  \"In these circumstances, what matters most to you? \"  Care focused more on comfort or quality of life.     Conversation Outcomes / Follow-Up Plan:   Recommended completion of Advance Directive form after review of ACP materials and conversation with prospective healthcare agent   Recommended communicating the plan and making copies for the healthcare agent, personal physician, and others as appropriate (e.g., health system)  Recommended review of completed ACP document annually or upon change in health status  updated his list of POA in chart and strongly recommended to get it on advance directive

## 2020-02-14 LAB
ALBUMIN SERPL-MCNC: 4.4 G/DL (ref 3.7–4.7)
ALBUMIN/CREAT UR: 402 MG/G CREAT (ref 0–29)
ALBUMIN/GLOB SERPL: 1.6 {RATIO} (ref 1.2–2.2)
ALP SERPL-CCNC: 76 IU/L (ref 39–117)
ALT SERPL-CCNC: 13 IU/L (ref 0–44)
APPEARANCE UR: CLEAR
AST SERPL-CCNC: 19 IU/L (ref 0–40)
BACTERIA #/AREA URNS HPF: ABNORMAL /[HPF]
BASOPHILS # BLD AUTO: 0 X10E3/UL (ref 0–0.2)
BASOPHILS NFR BLD AUTO: 1 %
BILIRUB SERPL-MCNC: 0.8 MG/DL (ref 0–1.2)
BILIRUB UR QL STRIP: NEGATIVE
BUN SERPL-MCNC: 20 MG/DL (ref 8–27)
BUN/CREAT SERPL: 18 (ref 10–24)
CALCIUM SERPL-MCNC: 9.4 MG/DL (ref 8.6–10.2)
CASTS URNS QL MICRO: ABNORMAL /LPF
CHLORIDE SERPL-SCNC: 102 MMOL/L (ref 96–106)
CHOLEST SERPL-MCNC: 173 MG/DL (ref 100–199)
CO2 SERPL-SCNC: 23 MMOL/L (ref 20–29)
COLOR UR: YELLOW
CREAT SERPL-MCNC: 1.14 MG/DL (ref 0.76–1.27)
CREAT UR-MCNC: 95.4 MG/DL
EOSINOPHIL # BLD AUTO: 0.2 X10E3/UL (ref 0–0.4)
EOSINOPHIL NFR BLD AUTO: 4 %
EPI CELLS #/AREA URNS HPF: ABNORMAL /HPF (ref 0–10)
ERYTHROCYTE [DISTWIDTH] IN BLOOD BY AUTOMATED COUNT: 15 % (ref 11.6–15.4)
EST. AVERAGE GLUCOSE BLD GHB EST-MCNC: 100 MG/DL
GLOBULIN SER CALC-MCNC: 2.8 G/DL (ref 1.5–4.5)
GLUCOSE SERPL-MCNC: 118 MG/DL (ref 65–99)
GLUCOSE UR QL: NEGATIVE
HBA1C MFR BLD: 5.1 % (ref 4.8–5.6)
HCT VFR BLD AUTO: 32.3 % (ref 37.5–51)
HDLC SERPL-MCNC: 38 MG/DL
HGB BLD-MCNC: 11.4 G/DL (ref 13–17.7)
HGB UR QL STRIP: ABNORMAL
IMM GRANULOCYTES # BLD AUTO: 0 X10E3/UL (ref 0–0.1)
IMM GRANULOCYTES NFR BLD AUTO: 1 %
INTERPRETATION, 910389: NORMAL
KETONES UR QL STRIP: NEGATIVE
LDLC SERPL CALC-MCNC: 107 MG/DL (ref 0–99)
LEUKOCYTE ESTERASE UR QL STRIP: ABNORMAL
LYMPHOCYTES # BLD AUTO: 1.1 X10E3/UL (ref 0.7–3.1)
LYMPHOCYTES NFR BLD AUTO: 24 %
MCH RBC QN AUTO: 32.8 PG (ref 26.6–33)
MCHC RBC AUTO-ENTMCNC: 35.3 G/DL (ref 31.5–35.7)
MCV RBC AUTO: 93 FL (ref 79–97)
MICRO URNS: ABNORMAL
MICROALBUMIN UR-MCNC: 383.2 UG/ML
MONOCYTES # BLD AUTO: 0.5 X10E3/UL (ref 0.1–0.9)
MONOCYTES NFR BLD AUTO: 10 %
MORPHOLOGY BLD-IMP: ABNORMAL
MUCOUS THREADS URNS QL MICRO: PRESENT
NEUTROPHILS # BLD AUTO: 2.7 X10E3/UL (ref 1.4–7)
NEUTROPHILS NFR BLD AUTO: 60 %
NITRITE UR QL STRIP: NEGATIVE
PH UR STRIP: 6 [PH] (ref 5–7.5)
PLATELET # BLD AUTO: ABNORMAL X10E3/UL
POTASSIUM SERPL-SCNC: 4.6 MMOL/L (ref 3.5–5.2)
PROT SERPL-MCNC: 7.2 G/DL (ref 6–8.5)
PROT UR QL STRIP: ABNORMAL
PSA SERPL-MCNC: 1.3 NG/ML (ref 0–4)
RBC # BLD AUTO: 3.48 X10E6/UL (ref 4.14–5.8)
RBC #/AREA URNS HPF: ABNORMAL /HPF (ref 0–2)
SODIUM SERPL-SCNC: 140 MMOL/L (ref 134–144)
SP GR UR: 1.02 (ref 1–1.03)
TRIGL SERPL-MCNC: 142 MG/DL (ref 0–149)
UROBILINOGEN UR STRIP-MCNC: 0.2 MG/DL (ref 0.2–1)
VLDLC SERPL CALC-MCNC: 28 MG/DL (ref 5–40)
WBC # BLD AUTO: 4.5 X10E3/UL (ref 3.4–10.8)
WBC #/AREA URNS HPF: ABNORMAL /HPF (ref 0–5)

## 2020-02-16 NOTE — PROGRESS NOTES
Please inform patient and fax results to Dr Adriana Pablo, Dr Ivon Arnold  Prostate cancer screen normal, urine still positive for blood and protein. Has h/o bladder cancer.  Will route results to urology, Dr Ivon Arnold  CBC shows low hgb, but stable, platelets were aggregated, so was cancelled by Lab  Kidney,liver,cholesterol, sugar results are normal. A1C is 5.1, I recommend to decrease his Insulin dose to 10 units BID ( he is taking 14 units bid)  Urine positive for protein,   thanks

## 2020-02-17 NOTE — PROGRESS NOTES
Call placed to patient. No answer left message on name confirmed voicemail for patient to return call to office regarding recent lab results.

## 2020-02-18 NOTE — PROGRESS NOTES
Outbound call to patient. Spoke with wife who is on PHI. Patients name and  verified. Reviewed recent labs with wife. She verbalized understanding. Letter printed with results.  Results faxed to Dr. Tabitha Donnelly and Dr. Pebbles Victoria

## 2020-02-20 ENCOUNTER — TELEPHONE (OUTPATIENT)
Dept: FAMILY MEDICINE CLINIC | Age: 80
End: 2020-02-20

## 2020-02-20 NOTE — TELEPHONE ENCOUNTER
Inbound call from patient. Name and  verified. Patient had question about his labs as they were reviewed with his wife. Advised to decrease insulin to 10 units BID. Patient states that is what he has been taking not 14. Should he decrease more. Please advise.

## 2020-03-22 DIAGNOSIS — Z79.4 TYPE 2 DIABETES MELLITUS WITH DIABETIC NEUROPATHY, WITH LONG-TERM CURRENT USE OF INSULIN (HCC): Primary | ICD-10-CM

## 2020-03-22 DIAGNOSIS — E11.40 TYPE 2 DIABETES MELLITUS WITH DIABETIC NEUROPATHY, WITH LONG-TERM CURRENT USE OF INSULIN (HCC): Primary | ICD-10-CM

## 2020-03-22 RX ORDER — NAPROXEN SODIUM 220 MG
TABLET ORAL
Qty: 180 SYRINGE | Refills: 1 | Status: SHIPPED | OUTPATIENT
Start: 2020-03-22 | End: 2020-10-12

## 2020-03-31 DIAGNOSIS — E11.40 TYPE 2 DIABETES MELLITUS WITH DIABETIC NEUROPATHY, WITH LONG-TERM CURRENT USE OF INSULIN (HCC): ICD-10-CM

## 2020-03-31 DIAGNOSIS — Z79.4 TYPE 2 DIABETES MELLITUS WITH DIABETIC NEUROPATHY, WITH LONG-TERM CURRENT USE OF INSULIN (HCC): ICD-10-CM

## 2020-03-31 RX ORDER — GABAPENTIN 300 MG/1
CAPSULE ORAL
Qty: 90 CAP | Refills: 0 | Status: SHIPPED | OUTPATIENT
Start: 2020-03-31 | End: 2020-07-14

## 2020-07-30 ENCOUNTER — OFFICE VISIT (OUTPATIENT)
Dept: FAMILY MEDICINE CLINIC | Age: 80
End: 2020-07-30

## 2020-07-30 VITALS
RESPIRATION RATE: 18 BRPM | BODY MASS INDEX: 28.14 KG/M2 | WEIGHT: 201 LBS | TEMPERATURE: 98.7 F | HEIGHT: 71 IN | OXYGEN SATURATION: 98 % | SYSTOLIC BLOOD PRESSURE: 119 MMHG | DIASTOLIC BLOOD PRESSURE: 63 MMHG | HEART RATE: 71 BPM

## 2020-07-30 DIAGNOSIS — Z79.4 TYPE 2 DIABETES MELLITUS WITH DIABETIC NEUROPATHY, WITH LONG-TERM CURRENT USE OF INSULIN (HCC): Primary | ICD-10-CM

## 2020-07-30 DIAGNOSIS — J43.9 PULMONARY EMPHYSEMA, UNSPECIFIED EMPHYSEMA TYPE (HCC): ICD-10-CM

## 2020-07-30 DIAGNOSIS — E11.40 TYPE 2 DIABETES MELLITUS WITH DIABETIC NEUROPATHY, WITH LONG-TERM CURRENT USE OF INSULIN (HCC): Primary | ICD-10-CM

## 2020-07-30 DIAGNOSIS — I10 BENIGN ESSENTIAL HTN: ICD-10-CM

## 2020-07-30 DIAGNOSIS — E11.43 CONTROLLED TYPE 2 DIABETES MELLITUS WITH DIABETIC AUTONOMIC NEUROPATHY, WITH LONG-TERM CURRENT USE OF INSULIN (HCC): ICD-10-CM

## 2020-07-30 DIAGNOSIS — Z79.4 CONTROLLED TYPE 2 DIABETES MELLITUS WITH DIABETIC AUTONOMIC NEUROPATHY, WITH LONG-TERM CURRENT USE OF INSULIN (HCC): ICD-10-CM

## 2020-07-30 DIAGNOSIS — D69.6 THROMBOCYTOPENIA (HCC): ICD-10-CM

## 2020-07-30 NOTE — PROGRESS NOTES
Chief Complaint   Patient presents with    Hypertension     6 month follow up    Diabetes     1. Have you been to the ER, urgent care clinic since your last visit? Hospitalized since your last visit? No    2. Have you seen or consulted any other health care providers outside of the [address] [place] [address] since your last visit? Include any pap smears or colon screening.  No

## 2020-07-30 NOTE — PROGRESS NOTES
HISTORY OF PRESENT ILLNESS  Jennifer Cancino is a 79 y.o. male. Seen for 4 months follow up on DM, HTN, emphysema, h/o ITP and h/o bladder cancer  He is still smoking  Follows urology and hematology  Follows cardiology for aortic stenosis  Doing well after his hip replacement   He had two falls since last visit. As per him, has h/o narcolepsy and he fall a sleep when sitting. Never happened when he is driving  HPI  Endocrine Review  He is seen for diabetes.  Since last visit he reports: no polyuria or polydipsia, no chest pain, dyspnea or TIA's, no unusual visual symptoms, no hypoglycemia, no medication side effects noted, has noticed numbness both feet.  Home glucose monitoring: is performed regularly, fasting values range 140-200  He is checking his sugars 2 per day.  He reports medication compliance: compliant all of the time.  Medication side effects: none.  Diabetic diet compliance: compliant all of the time.  Lab review: labs reviewed and discussed with patient.  Eye exam: overdue.  Referral done  His sliding scale Insulin was changed to NPH 10 units bid on last visit  Follows KAROLINE for his eye check up. Is advised to get cataract surgery, but he doesn't want to    Lab Results   Component Value Date/Time    Hemoglobin A1c 5.1 02/13/2020 12:18 PM    Hemoglobin A1c (POC) 5.4 06/28/2017 11:27 AM        COPD  Patient complains of cough, fatigue and shortness of breath. Symptoms began several years ago. Symptoms chronic dyspnea: severity = mild: course of sx: well controlled  becomes dyspneic after 5 blocks  symptoms worse with exertion. does worsen with exertion. Sputum is clear in small amounts.  Patient currently is not on home oxygen therapy. Joan Ask Respiratory history: pneumonia, COPD and history of 20  pack years tobacco use  Follows Dr April Harrison. PFT not c/w obstructive disease. He was advised to continue on Dulera and prn Albuterol. in spite of counseling, he is still smoking.  He has not followed up with Pulmonology for a long period   As per him he takes [place] that I prescribed before and that Only taking as needed. H/o ITP. As per Dr Karla Guan, have discussed platelet transfusion in past,in denial  Lab Results   Component Value Date/Time    WBC 4.5 02/13/2020 12:18 PM    HGB 11.4 (L) 02/13/2020 12:18 PM    HCT 32.3 (L) 02/13/2020 12:18 PM    PLATELET CANCELED 83/12/8569 12:18 PM    MCV 93 02/13/2020 12:18 PM     Taking Furosemide for chronic leg swellings      Review of Systems   Constitutional: Negative for chills, fever and malaise/fatigue. HENT: Negative for congestion, ear pain, sore throat and tinnitus. Eyes: Negative for blurred vision, double vision, pain and discharge. Respiratory: Negative for cough, shortness of breath and wheezing. Cardiovascular: Negative for chest pain, palpitations and leg swelling. Gastrointestinal: Negative for abdominal pain, blood in stool, constipation, diarrhea, nausea and vomiting. Genitourinary: Negative for dysuria, frequency, hematuria and urgency. Musculoskeletal: Positive for neck pain. Negative for back pain, joint pain and myalgias. Left wrist pain   Skin: Negative for rash. Neurological: Negative for dizziness, tremors, seizures and headaches. Endo/Heme/Allergies: Negative for polydipsia. Does not bruise/bleed easily. Psychiatric/Behavioral: Negative for depression and substance abuse. The patient is not nervous/anxious. Physical Exam  Vitals signs and nursing note reviewed. Constitutional:       Appearance: He is well-developed. He is not diaphoretic. HENT:      Head: Normocephalic and atraumatic. Right Ear: External ear normal.      Mouth/Throat:      Pharynx: No oropharyngeal exudate. Eyes:      General: No scleral icterus. Conjunctiva/sclera: Conjunctivae normal.      Pupils: Pupils are equal, round, and reactive to light. Neck:      Musculoskeletal: Normal range of motion and neck supple.  No neck rigidity or muscular tenderness. Thyroid: No thyromegaly. Vascular: No JVD. Cardiovascular:      Rate and Rhythm: Normal rate and regular rhythm. Heart sounds: Normal heart sounds. No murmur. Pulmonary:      Effort: Pulmonary effort is normal.      Breath sounds: Normal breath sounds. No wheezing. Abdominal:      General: Bowel sounds are normal. There is no distension. Palpations: Abdomen is soft. There is no mass. Musculoskeletal: Normal range of motion. General: No tenderness. Comments: Feet:warm, good capillary refill, no trophic changes or ulcerative lesions, normal DP and PT pulses and reduced sensation at both feet. Has yellow discoloration with thickening of all toes of both feet   Lymphadenopathy:      Cervical: No cervical adenopathy. Skin:     General: Skin is warm and dry. Findings: No rash. Neurological:      Mental Status: He is alert and oriented to person, place, and time. Cranial Nerves: No cranial nerve deficit. Deep Tendon Reflexes: Reflexes are normal and symmetric. Reflexes normal.         ASSESSMENT and PLAN  Diagnoses and all orders for this visit:    1. Type 2 diabetes mellitus with diabetic neuropathy, with long-term current use of insulin ([facility]  Assessment & Plan:  Well Controlled, based on history, physical exam and review of pertinent labs, studies and medications; meds reconciled; continue current treatment plan, lifestyle modifications recommended, medication compliance emphasized. Key Antihyperglycemic Medications             insulin NPH/insulin regular (HUMULIN 70/30 U-100 INSULIN) 100 unit/mL (70-30) injection (Taking) INJECT 10 UNITS UNDER THE SKIN BEFORE BREAKFAST AND DINNER.  DISCARD AFTER 28 DAYS FROM FIRST USE    metFORMIN ER (GLUCOPHAGE XR) 500 mg tablet (Taking) TAKE 1 TABLET BY MOUTH DAILY WITH DINNER        Other Key Diabetic Medications             gabapentin (NEURONTIN) 300 mg capsule (Taking) TAKE 1 CAPSULE BY MOUTH EVERY NIGHT        Lab Results   Component Value Date/Time    Hemoglobin A1c 5.1 02/13/2020 12:18 PM    Glucose 118 02/13/2020 12:18 PM    Creatinine 1.14 02/13/2020 12:18 PM    Microalb/Creat ratio (ug/mg creat.) 402 02/13/2020 12:18 PM    Cholesterol, total 173 02/13/2020 12:18 PM    HDL Cholesterol 38 02/13/2020 12:18 PM    LDL, calculated 107 02/13/2020 12:18 PM    Triglyceride 142 02/13/2020 12:18 PM     Diabetic Foot and Eye Exam HM Status   Topic Date Due    Diabetic Foot Care  06/24/2020       Orders:  -     HM DIABETES FOOT EXAM  -     METABOLIC PANEL, COMPREHENSIVE  -     HEMOGLOBIN A1C WITH EAG  -     LIPID PANEL  -     insulin NPH/insulin regular (HumuLIN 70/30 U-100 Insulin) 100 unit/mL (70-30) injection; INJECT 10 UNITS UNDER THE SKIN BEFORE BREAKFAST AND DINNER. DISCARD AFTER 28 DAYS FROM FIRST USE    2. Thrombocytopenia ([facility]  Assessment & Plan:  Stable, based on history, physical exam and review of pertinent labs, studies and medications; meds reconciled; continue current treatment plan., This condition is managed by Specialist.  Lab Results   Component Value Date/Time    WBC 4.5 02/13/2020 12:18 PM    HGB 11.4 02/13/2020 12:18 PM    HCT 32.3 02/13/2020 12:18 PM    PLATELET CANCELED 61/93/4573 12:18 PM    Creatinine 1.14 02/13/2020 12:18 PM    BUN 20 02/13/2020 12:18 PM    Potassium 4.6 02/13/2020 12:18 PM       Orders:  -     CBC WITH AUTOMATED DIFF    3. Pulmonary emphysema, unspecified emphysema type ([address]  Assessment & Plan:  Well Controlled, based on history, physical exam and review of pertinent labs, studies and medications; meds reconciled; continue current treatment plan, lifestyle modifications recommended, medication compliance emphasized. , This condition is managed by Specialist.  Key COPD Medications             albuterol (PROAIR HFA) 90 mcg/actuation inhaler (Taking) Take 1 Puff by inhalation.     mometasone-formoterol (DULERA) 100-5 mcg/actuation HFA inhaler (Taking) INL 2 PFS PO BID Lab Results   Component Value Date/Time    WBC 4.5 02/13/2020 12:18 PM    HGB 11.4 02/13/2020 12:18 PM    HCT 32.3 02/13/2020 12:18 PM    PLATELET CANCELED 76/24/0388 12:18 PM       Orders:  -     CBC WITH AUTOMATED DIFF    4. Benign essential HTN  -     METABOLIC PANEL, COMPREHENSIVE    5. Controlled type 2 diabetes mellitus with diabetic autonomic neuropathy, with long-term current use of insulin ([facility])  -      DIABETES FOOT EXAM  -     METABOLIC PANEL, COMPREHENSIVE  -     HEMOGLOBIN A1C WITH EAG  -     LIPID PANEL    Discussed lifestyle issues and health guidance given  Patient was given an after visit summary which includes diagnoses, vital signs, current medications, instructions and references & authorized prescriptions . Results of labs will be conveyed to patient, once available. Pt verbalized instructions I provided and expressed understanding of discussion that was held today. Follow-up and Dispositions    · Return in about 4 months (around 11/30/2020) for follow up, fasting.

## 2020-07-30 NOTE — ASSESSMENT & PLAN NOTE
Well Controlled, based on history, physical exam and review of pertinent labs, studies and medications; meds reconciled; continue current treatment plan, lifestyle modifications recommended, medication compliance emphasized. , This condition is managed by Specialist.  Key COPD Medications             albuterol (PROAIR HFA) 90 mcg/actuation inhaler (Taking) Take 1 Puff by inhalation.     mometasone-formoterol (DULERA) 100-5 mcg/actuation HFA inhaler (Taking) INL 2 PFS PO BID        Lab Results   Component Value Date/Time    WBC 4.5 02/13/2020 12:18 PM    HGB 11.4 02/13/2020 12:18 PM    HCT 32.3 02/13/2020 12:18 PM    PLATELET CANCELED 75/81/9673 12:18 PM

## 2020-07-30 NOTE — ASSESSMENT & PLAN NOTE
Well Controlled, based on history, physical exam and review of pertinent labs, studies and medications; meds reconciled; continue current treatment plan, lifestyle modifications recommended, medication compliance emphasized. Key Antihyperglycemic Medications             insulin NPH/insulin regular (HUMULIN 70/30 U-100 INSULIN) 100 unit/mL (70-30) injection (Taking) INJECT 10 UNITS UNDER THE SKIN BEFORE BREAKFAST AND DINNER.  DISCARD AFTER 28 DAYS FROM FIRST USE    metFORMIN ER (GLUCOPHAGE XR) 500 mg tablet (Taking) TAKE 1 TABLET BY MOUTH DAILY WITH DINNER        Other Key Diabetic Medications             gabapentin (NEURONTIN) 300 mg capsule (Taking) TAKE 1 CAPSULE BY MOUTH EVERY NIGHT        Lab Results   Component Value Date/Time    Hemoglobin A1c 5.1 02/13/2020 12:18 PM    Glucose 118 02/13/2020 12:18 PM    Creatinine 1.14 02/13/2020 12:18 PM    Microalb/Creat ratio (ug/mg creat.) 402 02/13/2020 12:18 PM    Cholesterol, total 173 02/13/2020 12:18 PM    HDL Cholesterol 38 02/13/2020 12:18 PM    LDL, calculated 107 02/13/2020 12:18 PM    Triglyceride 142 02/13/2020 12:18 PM     Diabetic Foot and Eye Exam HM Status   Topic Date Due    Diabetic Foot Care  06/24/2020

## 2020-07-30 NOTE — ASSESSMENT & PLAN NOTE
Stable, based on history, physical exam and review of pertinent labs, studies and medications; meds reconciled; continue current treatment plan., This condition is managed by Specialist.  Lab Results   Component Value Date/Time    WBC 4.5 02/13/2020 12:18 PM    HGB 11.4 02/13/2020 12:18 PM    HCT 32.3 02/13/2020 12:18 PM    PLATELET CANCELED 50/74/4357 12:18 PM    Creatinine 1.14 02/13/2020 12:18 PM    BUN 20 02/13/2020 12:18 PM    Potassium 4.6 02/13/2020 12:18 PM

## 2020-08-11 DIAGNOSIS — N40.1 BENIGN PROSTATIC HYPERPLASIA WITH URINARY FREQUENCY: ICD-10-CM

## 2020-08-11 DIAGNOSIS — R35.0 BENIGN PROSTATIC HYPERPLASIA WITH URINARY FREQUENCY: ICD-10-CM

## 2020-08-11 RX ORDER — TAMSULOSIN HYDROCHLORIDE 0.4 MG/1
CAPSULE ORAL
Qty: 90 CAP | Refills: 0 | Status: SHIPPED | OUTPATIENT
Start: 2020-08-11 | End: 2020-12-09

## 2020-09-10 DIAGNOSIS — R60.9 EDEMA, UNSPECIFIED TYPE: ICD-10-CM

## 2020-09-10 DIAGNOSIS — I35.0 NONRHEUMATIC AORTIC VALVE STENOSIS: ICD-10-CM

## 2020-09-10 RX ORDER — FUROSEMIDE 40 MG/1
TABLET ORAL
Qty: 90 TAB | Refills: 1 | Status: SHIPPED | OUTPATIENT
Start: 2020-09-10 | End: 2021-02-28

## 2020-10-09 DIAGNOSIS — E11.40 TYPE 2 DIABETES MELLITUS WITH DIABETIC NEUROPATHY, WITH LONG-TERM CURRENT USE OF INSULIN (HCC): ICD-10-CM

## 2020-10-09 DIAGNOSIS — Z79.4 TYPE 2 DIABETES MELLITUS WITH DIABETIC NEUROPATHY, WITH LONG-TERM CURRENT USE OF INSULIN (HCC): ICD-10-CM

## 2020-10-09 RX ORDER — GABAPENTIN 300 MG/1
CAPSULE ORAL
Qty: 30 CAP | Refills: 0 | Status: SHIPPED | OUTPATIENT
Start: 2020-10-09 | End: 2020-11-19 | Stop reason: SDUPTHER

## 2020-10-12 DIAGNOSIS — E11.40 TYPE 2 DIABETES MELLITUS WITH DIABETIC NEUROPATHY, WITH LONG-TERM CURRENT USE OF INSULIN (HCC): ICD-10-CM

## 2020-10-12 DIAGNOSIS — Z79.4 TYPE 2 DIABETES MELLITUS WITH DIABETIC NEUROPATHY, WITH LONG-TERM CURRENT USE OF INSULIN (HCC): ICD-10-CM

## 2020-10-12 RX ORDER — NAPROXEN SODIUM 220 MG
TABLET ORAL
Qty: 100 SYRINGE | Refills: 1 | Status: SHIPPED | OUTPATIENT
Start: 2020-10-12 | End: 2021-03-17 | Stop reason: SDUPTHER

## 2020-11-19 ENCOUNTER — OFFICE VISIT (OUTPATIENT)
Dept: FAMILY MEDICINE CLINIC | Age: 80
End: 2020-11-19
Payer: MEDICARE

## 2020-11-19 VITALS
RESPIRATION RATE: 18 BRPM | OXYGEN SATURATION: 98 % | HEIGHT: 71 IN | WEIGHT: 202 LBS | SYSTOLIC BLOOD PRESSURE: 138 MMHG | BODY MASS INDEX: 28.28 KG/M2 | DIASTOLIC BLOOD PRESSURE: 72 MMHG | HEART RATE: 69 BPM | TEMPERATURE: 97.8 F

## 2020-11-19 DIAGNOSIS — E11.40 TYPE 2 DIABETES MELLITUS WITH DIABETIC NEUROPATHY, WITH LONG-TERM CURRENT USE OF INSULIN (HCC): ICD-10-CM

## 2020-11-19 DIAGNOSIS — D69.6 THROMBOCYTOPENIA (HCC): ICD-10-CM

## 2020-11-19 DIAGNOSIS — Z72.0 TOBACCO ABUSE: ICD-10-CM

## 2020-11-19 DIAGNOSIS — Z79.4 CONTROLLED TYPE 2 DIABETES MELLITUS WITH DIABETIC AUTONOMIC NEUROPATHY, WITH LONG-TERM CURRENT USE OF INSULIN (HCC): Primary | ICD-10-CM

## 2020-11-19 DIAGNOSIS — Z79.4 TYPE 2 DIABETES MELLITUS WITH DIABETIC NEUROPATHY, WITH LONG-TERM CURRENT USE OF INSULIN (HCC): ICD-10-CM

## 2020-11-19 DIAGNOSIS — N40.1 BENIGN PROSTATIC HYPERPLASIA WITH URINARY FREQUENCY: ICD-10-CM

## 2020-11-19 DIAGNOSIS — R35.0 BENIGN PROSTATIC HYPERPLASIA WITH URINARY FREQUENCY: ICD-10-CM

## 2020-11-19 DIAGNOSIS — R30.0 DYSURIA: ICD-10-CM

## 2020-11-19 DIAGNOSIS — F17.210 SMOKING GREATER THAN 20 PACK YEARS: ICD-10-CM

## 2020-11-19 DIAGNOSIS — J43.9 PULMONARY EMPHYSEMA, UNSPECIFIED EMPHYSEMA TYPE (HCC): ICD-10-CM

## 2020-11-19 DIAGNOSIS — I10 BENIGN ESSENTIAL HTN: ICD-10-CM

## 2020-11-19 DIAGNOSIS — E11.43 CONTROLLED TYPE 2 DIABETES MELLITUS WITH DIABETIC AUTONOMIC NEUROPATHY, WITH LONG-TERM CURRENT USE OF INSULIN (HCC): Primary | ICD-10-CM

## 2020-11-19 PROBLEM — C67.9 MALIGNANT NEOPLASM OF URINARY BLADDER (HCC): Status: RESOLVED | Noted: 2019-04-24 | Resolved: 2020-11-19

## 2020-11-19 LAB
ALBUMIN SERPL-MCNC: 3.9 G/DL (ref 3.5–5)
ALBUMIN/GLOB SERPL: 1.1 {RATIO} (ref 1.1–2.2)
ALP SERPL-CCNC: 78 U/L (ref 45–117)
ALT SERPL-CCNC: 22 U/L (ref 12–78)
ANION GAP SERPL CALC-SCNC: 7 MMOL/L (ref 5–15)
AST SERPL-CCNC: 23 U/L (ref 15–37)
BILIRUB SERPL-MCNC: 0.6 MG/DL (ref 0.2–1)
BUN SERPL-MCNC: 28 MG/DL (ref 6–20)
BUN/CREAT SERPL: 22 (ref 12–20)
CALCIUM SERPL-MCNC: 9.2 MG/DL (ref 8.5–10.1)
CHLORIDE SERPL-SCNC: 106 MMOL/L (ref 97–108)
CHOLEST SERPL-MCNC: 162 MG/DL
CO2 SERPL-SCNC: 27 MMOL/L (ref 21–32)
CREAT SERPL-MCNC: 1.3 MG/DL (ref 0.7–1.3)
EST. AVERAGE GLUCOSE BLD GHB EST-MCNC: 108 MG/DL
GLOBULIN SER CALC-MCNC: 3.6 G/DL (ref 2–4)
GLUCOSE SERPL-MCNC: 120 MG/DL (ref 65–100)
HBA1C MFR BLD: 5.4 % (ref 4–5.6)
HDLC SERPL-MCNC: 46 MG/DL
HDLC SERPL: 3.5 {RATIO} (ref 0–5)
LDLC SERPL CALC-MCNC: 95.6 MG/DL (ref 0–100)
LIPID PROFILE,FLP: NORMAL
POTASSIUM SERPL-SCNC: 4.8 MMOL/L (ref 3.5–5.1)
PROT SERPL-MCNC: 7.5 G/DL (ref 6.4–8.2)
SODIUM SERPL-SCNC: 140 MMOL/L (ref 136–145)
TRIGL SERPL-MCNC: 102 MG/DL (ref ?–150)
VLDLC SERPL CALC-MCNC: 20.4 MG/DL

## 2020-11-19 PROCEDURE — G8752 SYS BP LESS 140: HCPCS | Performed by: FAMILY MEDICINE

## 2020-11-19 PROCEDURE — G8754 DIAS BP LESS 90: HCPCS | Performed by: FAMILY MEDICINE

## 2020-11-19 PROCEDURE — G8419 CALC BMI OUT NRM PARAM NOF/U: HCPCS | Performed by: FAMILY MEDICINE

## 2020-11-19 PROCEDURE — G8510 SCR DEP NEG, NO PLAN REQD: HCPCS | Performed by: FAMILY MEDICINE

## 2020-11-19 PROCEDURE — 99214 OFFICE O/P EST MOD 30 MIN: CPT | Performed by: FAMILY MEDICINE

## 2020-11-19 PROCEDURE — 1101F PT FALLS ASSESS-DOCD LE1/YR: CPT | Performed by: FAMILY MEDICINE

## 2020-11-19 PROCEDURE — G8427 DOCREV CUR MEDS BY ELIG CLIN: HCPCS | Performed by: FAMILY MEDICINE

## 2020-11-19 PROCEDURE — G8536 NO DOC ELDER MAL SCRN: HCPCS | Performed by: FAMILY MEDICINE

## 2020-11-19 RX ORDER — CIPROFLOXACIN 500 MG/1
TABLET ORAL
COMMUNITY
Start: 2020-10-21 | End: 2020-11-19 | Stop reason: ALTCHOICE

## 2020-11-19 RX ORDER — GABAPENTIN 300 MG/1
CAPSULE ORAL
Qty: 30 CAP | Refills: 0 | Status: SHIPPED | OUTPATIENT
Start: 2020-11-19 | End: 2021-02-02 | Stop reason: SDUPTHER

## 2020-11-19 RX ORDER — IBUPROFEN 200 MG
1 TABLET ORAL EVERY 24 HOURS
Qty: 30 PATCH | Refills: 0 | Status: SHIPPED | OUTPATIENT
Start: 2020-11-19 | End: 2020-12-19

## 2020-11-19 RX ORDER — DOXYCYCLINE HYCLATE 100 MG
TABLET ORAL
COMMUNITY
Start: 2020-10-23 | End: 2020-11-19 | Stop reason: ALTCHOICE

## 2020-11-19 NOTE — PROGRESS NOTES
HISTORY OF PRESENT ILLNESS  Patsy Kelley is a 79 y.o. male. seen for 4 months follow up on several medical issues. He is still smoking  Follows urology and hematology  Follows cardiology for aortic stenosis  Doing well after his hip replacement . Pain has improved significantly. Had covid testing done 3 days ago at [facility] to exposure. Result reviewed in chart. negative  HPI    Endocrine Review  He is seen for diabetes.  Since last visit he reports: no polyuria or polydipsia, no chest pain, dyspnea or TIA's, no unusual visual symptoms, no hypoglycemia, no medication side effects noted, has noticed numbness both feet.  Home glucose monitoring: is performed regularly, fasting values range 140-200  He is checking his sugars 2 per day.  He reports medication compliance: compliant all of the time.  Medication side effects: none.  Diabetic diet compliance: compliant all of the time.  Lab review: labs reviewed and discussed with patient.  Eye exam: overdue.  Referral done  His sliding scale Insulin was changed to NPH 10 units bid on last visit  Follows KAROLINE for his eye check up. Is advised to get cataract surgery, but he doesn't want to     Lab Results   Component Value Date/Time    Hemoglobin A1c 5.1 02/13/2020 12:18 PM    Hemoglobin A1c (POC) 5.4 06/28/2017 11:27 AM        COPD  Patient complains of cough, fatigue and shortness of breath. Symptoms began several years ago. Symptoms chronic dyspnea: severity = mild: course of sx: well controlled  becomes dyspneic after 5 blocks  symptoms worse with exertion. does worsen with exertion. Sputum is clear in small amounts.  Patient currently is not on home oxygen therapy. Davide Saini Respiratory history: pneumonia, COPD and history of 20  pack years tobacco use  Follows Dr Shana Martínez. PFT not c/w obstructive disease. He was advised to continue on Dulera and prn Albuterol. in spite of counseling, he is still smoking.  He has not followed up with Pulmonology for a long period   As per him he takes Dulera that I prescribed before and that Only taking as needed.     H/o ITP. As per Dr Pancho Montero, have discussed platelet transfusion in past,in denial    Lab Results   Component Value Date/Time    WBC 4.5 02/13/2020 12:18 PM    HGB 11.4 (L) 02/13/2020 12:18 PM    HCT 32.3 (L) 02/13/2020 12:18 PM    PLATELET CANCELED 88/14/6304 12:18 PM    MCV 93 02/13/2020 12:18 PM       Taking Furosemide for chronic leg swellings    Recently having worsening of dysuria. [facility] urology and treated with antibiotic course x 2 ( Cipro and Doxy) but his symptoms are getting worst, mainly at night time. Has pain and burning with urgency and hesitancy . Review of Systems   Constitutional: Negative for chills, fever and malaise/fatigue. HENT: Negative for congestion, ear pain, sore throat and tinnitus. Eyes: Negative for blurred vision, double vision, pain and discharge. Respiratory: Negative for cough, shortness of breath and wheezing. Cardiovascular: Negative for chest pain, palpitations and leg swelling. Gastrointestinal: Negative for abdominal pain, blood in stool, constipation, diarrhea, nausea and vomiting. Genitourinary: Positive for dysuria, frequency and urgency. Negative for hematuria. Musculoskeletal: Negative for back pain, joint pain and myalgias. Skin: Negative for rash. Neurological: Negative for dizziness, tremors, seizures and headaches. Sharp burning pain both feet   Endo/Heme/Allergies: Negative for polydipsia. Does not bruise/bleed easily. Psychiatric/Behavioral: Negative for depression and substance abuse. The patient is not nervous/anxious. Physical Exam  Vitals signs and nursing note reviewed. Constitutional:       Appearance: He is well-developed. He is not diaphoretic. HENT:      Head: Normocephalic and atraumatic. Right Ear: External ear normal.      Mouth/Throat:      Pharynx: No oropharyngeal exudate. Eyes:      General: No scleral icterus. Conjunctiva/sclera: Conjunctivae normal.      Pupils: Pupils are equal, round, and reactive to light. Neck:      Musculoskeletal: Normal range of motion and neck supple. Thyroid: No thyromegaly. Vascular: No JVD. Cardiovascular:      Rate and Rhythm: Normal rate and regular rhythm. Heart sounds: Normal heart sounds. No murmur. Pulmonary:      Effort: Pulmonary effort is normal.      Breath sounds: Normal breath sounds. No wheezing. Abdominal:      General: Bowel sounds are normal. There is no distension. Palpations: Abdomen is soft. There is no mass. Tenderness: There is abdominal tenderness in the suprapubic area. Musculoskeletal: Normal range of motion. General: No tenderness. Lymphadenopathy:      Cervical: No cervical adenopathy. Skin:     General: Skin is warm and dry. Findings: No rash. Neurological:      Mental Status: He is alert and oriented to person, place, and time. Cranial Nerves: No cranial nerve deficit. Deep Tendon Reflexes: Reflexes are normal and symmetric. Reflexes normal.         ASSESSMENT and PLAN  Diagnoses and all orders for this visit:    1. Controlled type 2 diabetes mellitus with diabetic autonomic neuropathy, with long-term current use of insulin ([facility])  -     METABOLIC PANEL, COMPREHENSIVE; Future  -     HEMOGLOBIN A1C WITH EAG; Future  -     LIPID PANEL; Future    2. Benign prostatic hyperplasia with urinary frequency    3. Thrombocytopenia ([facility])  -     CBC WITH AUTOMATED DIFF; Future    4. Smoking greater than 20 pack years  -     nicotine (NICODERM CQ) 21 mg/24 hr; 1 Patch by TransDERmal route every twenty-four (24) hours for 30 days. 5. Benign essential HTN  -     METABOLIC PANEL, COMPREHENSIVE; Future    6. Tobacco abuse  -     nicotine (NICODERM CQ) 21 mg/24 hr; 1 Patch by TransDERmal route every twenty-four (24) hours for 30 days. 7. Dysuria  -     CULTURE, URINE; Future    8.  Type 2 diabetes mellitus with diabetic neuropathy, with long-term current use of insulin ([facility])  -     gabapentin (NEURONTIN) 300 mg capsule; TAKE 1 CAPSULE BY MOUTH EVERY NIGHT    9. Pulmonary emphysema, unspecified emphysema type ([facility] [address]  Assessment & Plan:  Stable, based on history, physical exam and review of pertinent labs, studies and medications; meds reconciled; continue current treatment plan, lifestyle modifications recommended, medication compliance emphasized, again had counseling on smoking cessation. Key COPD Medications             albuterol (PROAIR HFA) 90 mcg/actuation inhaler (Taking) Take 1 Puff by inhalation. mometasone-formoterol (DULERA) 100-5 mcg/actuation HFA inhaler (Taking) INL 2 PFS PO BID        Lab Results   Component Value Date/Time    WBC 4.5 02/13/2020 12:18 PM    HGB 11.4 02/13/2020 12:18 PM    HCT 32.3 02/13/2020 12:18 PM    PLATELET CANCELED 52/50/5368 12:18 PM           Discussed lifestyle issues and health guidance given  Patient was given an after visit summary which includes diagnoses, vital signs, current medications, instructions and references & authorized prescriptions . Results of labs will be conveyed to patient, once available. Pt verbalized instructions I provided and expressed understanding of discussion that was held today. Follow-up and Dispositions    · Return in about 4 months (around 3/19/2021) for medicare wellness.

## 2020-11-19 NOTE — PATIENT INSTRUCTIONS
Painful Urination (Dysuria): Care Instructions Your Care Instructions Burning pain with urination (dysuria) is a common symptom of a urinary tract infection or other urinary problems. The bladder may become inflamed. This can cause pain when the bladder fills and empties. You may also feel pain if the tube that carries urine from the bladder to the outside of the body (urethra) gets irritated or infected. Sexually transmitted infections (STIs) also may cause pain when you urinate. Sometimes the pain can be caused by things other than an infection. The urethra can be irritated by soaps, perfumes, or foreign objects in the urethra. Kidney stones can cause pain when they pass through the urethra. The cause may be hard to find. You may need tests. Treatment for painful urination depends on the cause. Follow-up care is a key part of your treatment and safety. Be sure to make and go to all appointments, and call your doctor if you are having problems. It's also a good idea to know your test results and keep a list of the medicines you take. How can you care for yourself at home? · Drink extra water for the next day or two. This will help make the urine less concentrated. (If you have kidney, heart, or liver disease and have to limit fluids, talk with your doctor before you increase the amount of fluids you drink.) · Avoid drinks that are carbonated or have caffeine. They can irritate the bladder. · Urinate often. Try to empty your bladder each time. For women: · Urinate right after you have sex. · After going to the bathroom, wipe from front to back. · Avoid douches, bubble baths, and feminine hygiene sprays. And avoid other feminine hygiene products that have deodorants. When should you call for help? Call your doctor now or seek immediate medical care if: 
  · You have new symptoms, such as fever, nausea, or vomiting.  
  · You have new or worse symptoms of a urinary problem. For example: ? You have blood or pus in your urine. ? You have chills or body aches. ? It hurts worse to urinate. ? You have groin or belly pain. ? You have pain in your back just below your rib cage (the flank area). Watch closely for changes in your health, and be sure to contact your doctor if you have any problems. Where can you learn more? Go to http://www.gray.com/ Enter H395 in the search box to learn more about \"Painful Urination (Dysuria): Care Instructions. \" Current as of: June 29, 2020               Content Version: 12.6 © 3483-9732 [employer], [employer]. Care instructions adapted under license by [employer] (which disclaims liability or warranty for this information). If you have questions about a medical condition or this instruction, always ask your healthcare professional. [address] any warranty or liability for your use of this information.

## 2020-11-19 NOTE — ASSESSMENT & PLAN NOTE
Stable, based on history, physical exam and review of pertinent labs, studies and medications; meds reconciled; continue current treatment plan, lifestyle modifications recommended, medication compliance emphasized, again had counseling on smoking cessation. Key COPD Medications             albuterol (PROAIR HFA) 90 mcg/actuation inhaler (Taking) Take 1 Puff by inhalation.     mometasone-formoterol (DULERA) 100-5 mcg/actuation HFA inhaler (Taking) INL 2 PFS PO BID        Lab Results   Component Value Date/Time    WBC 4.5 02/13/2020 12:18 PM    HGB 11.4 02/13/2020 12:18 PM    HCT 32.3 02/13/2020 12:18 PM    PLATELET CANCELED 21/92/9666 12:18 PM

## 2020-11-19 NOTE — PROGRESS NOTES
Chief Complaint   Patient presents with    Hypertension     4 month follow up    Diabetes     1. Have you been to the ER, urgent care clinic since your last visit? Hospitalized since your last visit? No    2. Have you seen or consulted any other health care providers outside of the [address] [place] [address] since your last visit? Include any pap smears or colon screening.  No

## 2020-11-20 LAB
BASOPHILS # BLD: 0.1 K/UL (ref 0–0.1)
BASOPHILS NFR BLD: 1 % (ref 0–1)
DIFFERENTIAL METHOD BLD: ABNORMAL
EOSINOPHIL # BLD: 0.2 K/UL (ref 0–0.4)
EOSINOPHIL NFR BLD: 4 % (ref 0–7)
ERYTHROCYTE [DISTWIDTH] IN BLOOD BY AUTOMATED COUNT: 17.1 % (ref 11.5–14.5)
HCT VFR BLD AUTO: 33.8 % (ref 36.6–50.3)
HGB BLD-MCNC: 10.9 G/DL (ref 12.1–17)
IMM GRANULOCYTES # BLD AUTO: 0.1 K/UL (ref 0–0.04)
IMM GRANULOCYTES NFR BLD AUTO: 2 % (ref 0–0.5)
LYMPHOCYTES # BLD: 1.1 K/UL (ref 0.8–3.5)
LYMPHOCYTES NFR BLD: 19 % (ref 12–49)
MCH RBC QN AUTO: ABNORMAL PG (ref 26–34)
MCHC RBC AUTO-ENTMCNC: 32.2 G/DL (ref 30–36.5)
MCV RBC AUTO: ABNORMAL FL (ref 80–99)
MONOCYTES # BLD: 0.5 K/UL (ref 0–1)
MONOCYTES NFR BLD: 9 % (ref 5–13)
NEUTS SEG # BLD: 3.8 K/UL (ref 1.8–8)
NEUTS SEG NFR BLD: 65 % (ref 32–75)
NRBC # BLD: 0.02 K/UL (ref 0–0.01)
NRBC BLD-RTO: 0.3 PER 100 WBC
PLATELET # BLD AUTO: 56 K/UL (ref 150–400)
PMV BLD AUTO: 11.3 FL (ref 8.9–12.9)
RBC # BLD AUTO: ABNORMAL M/UL (ref 4.1–5.7)
RBC MORPH BLD: ABNORMAL
WBC # BLD AUTO: 5.8 K/UL (ref 4.1–11.1)

## 2020-11-20 RX ORDER — CEFUROXIME AXETIL 250 MG/1
250 TABLET ORAL 2 TIMES DAILY
Qty: 20 TAB | Refills: 0 | Status: SHIPPED | OUTPATIENT
Start: 2020-11-20 | End: 2021-03-17 | Stop reason: ALTCHOICE

## 2020-11-20 NOTE — PROGRESS NOTES
Patient was left an voice message on mobile number that per results reviewed by Dr. Sol Galdamez, urine shows bacteria. A prescription has been sent to his [address], Ceftin 250 mg tablets, take one tablet twice a day, complete regimen of medication, any questions contact our office.  Prudence Garcia LPN

## 2020-11-20 NOTE — PROGRESS NOTES
Cehry,  Please let him know urine culture is still positive for bacteria with significant colony count. Most likely Klebsiella or E coli, I am sending Rx to pharmacy   To complete course.  He will likely need maintenance antibiotic  thanks

## 2020-11-20 NOTE — PROGRESS NOTES
Please inform patient,  Kidney,liver,cholesterol,diabetes readings are very stable  CBC shows low hgb and low platelets.  He follows Dr Maria Teresa Bradford, please fax results to him  Urine culture is still pending  thanks

## 2020-11-22 LAB
BACTERIA SPEC CULT: ABNORMAL
CC UR VC: ABNORMAL
SERVICE CMNT-IMP: ABNORMAL

## 2020-11-23 NOTE — PROGRESS NOTES
Outbound call to patient was unable to reach patient at this time left voicemail for patient to return call at earliest conveneince.

## 2020-12-09 ENCOUNTER — ANCILLARY PROCEDURE (OUTPATIENT)
Dept: CARDIOLOGY CLINIC | Age: 80
End: 2020-12-09
Payer: MEDICARE

## 2020-12-09 ENCOUNTER — OFFICE VISIT (OUTPATIENT)
Dept: CARDIOLOGY CLINIC | Age: 80
End: 2020-12-09
Payer: MEDICARE

## 2020-12-09 VITALS
BODY MASS INDEX: 28.28 KG/M2 | WEIGHT: 202 LBS | SYSTOLIC BLOOD PRESSURE: 130 MMHG | RESPIRATION RATE: 18 BRPM | HEIGHT: 71 IN | DIASTOLIC BLOOD PRESSURE: 60 MMHG | OXYGEN SATURATION: 95 % | HEART RATE: 84 BPM

## 2020-12-09 VITALS
BODY MASS INDEX: 28.28 KG/M2 | SYSTOLIC BLOOD PRESSURE: 138 MMHG | DIASTOLIC BLOOD PRESSURE: 72 MMHG | HEIGHT: 71 IN | WEIGHT: 202 LBS

## 2020-12-09 DIAGNOSIS — J44.9 CHRONIC OBSTRUCTIVE PULMONARY DISEASE, UNSPECIFIED COPD TYPE (HCC): ICD-10-CM

## 2020-12-09 DIAGNOSIS — I35.0 AORTIC VALVE STENOSIS, ETIOLOGY OF CARDIAC VALVE DISEASE UNSPECIFIED: Primary | ICD-10-CM

## 2020-12-09 DIAGNOSIS — E11.21 TYPE 2 DIABETES WITH NEPHROPATHY (HCC): ICD-10-CM

## 2020-12-09 DIAGNOSIS — N40.1 BENIGN PROSTATIC HYPERPLASIA WITH URINARY FREQUENCY: ICD-10-CM

## 2020-12-09 DIAGNOSIS — I35.0 AORTIC VALVE STENOSIS, ETIOLOGY OF CARDIAC VALVE DISEASE UNSPECIFIED: ICD-10-CM

## 2020-12-09 DIAGNOSIS — R35.0 BENIGN PROSTATIC HYPERPLASIA WITH URINARY FREQUENCY: ICD-10-CM

## 2020-12-09 DIAGNOSIS — Z72.0 TOBACCO USE: ICD-10-CM

## 2020-12-09 DIAGNOSIS — I10 BENIGN ESSENTIAL HTN: ICD-10-CM

## 2020-12-09 LAB
ECHO AO ROOT DIAM: 3.77 CM
ECHO AV AREA PEAK VELOCITY: 1.5 CM2
ECHO AV AREA VTI: 1.4 CM2
ECHO AV AREA/BSA PEAK VELOCITY: 0.7 CM2/M2
ECHO AV AREA/BSA VTI: 0.7 CM2/M2
ECHO AV MEAN GRADIENT: 31.82 MMHG
ECHO AV PEAK GRADIENT: 46.45 MMHG
ECHO AV PEAK VELOCITY: 340.76 CM/S
ECHO AV VTI: 89.83 CM
ECHO IVC PROX: 1.81 CM
ECHO LA AREA 4C: 23.79 CM2
ECHO LA MAJOR AXIS: 3.87 CM
ECHO LA MINOR AXIS: 1.83 CM
ECHO LA VOL 2C: 91.71 ML (ref 18–58)
ECHO LA VOL 4C: 77.2 ML (ref 18–58)
ECHO LA VOL BP: 91.81 ML (ref 18–58)
ECHO LA VOL/BSA BIPLANE: 43.35 ML/M2 (ref 16–28)
ECHO LA VOLUME INDEX A2C: 43.3 ML/M2 (ref 16–28)
ECHO LA VOLUME INDEX A4C: 36.45 ML/M2 (ref 16–28)
ECHO LV E' LATERAL VELOCITY: 7.44 CM/S
ECHO LV E' SEPTAL VELOCITY: 6.42 CM/S
ECHO LV EDV A2C: 141.19 ML
ECHO LV EDV A4C: 114.37 ML
ECHO LV EDV BP: 129.8 ML (ref 67–155)
ECHO LV EDV INDEX A4C: 54 ML/M2
ECHO LV EDV INDEX BP: 61.3 ML/M2
ECHO LV EDV NDEX A2C: 66.7 ML/M2
ECHO LV EJECTION FRACTION A2C: 57 PERCENT
ECHO LV EJECTION FRACTION A4C: 69 PERCENT
ECHO LV EJECTION FRACTION BIPLANE: 62.7 PERCENT (ref 55–100)
ECHO LV ESV A2C: 61.2 ML
ECHO LV ESV A4C: 35.58 ML
ECHO LV ESV BP: 48.45 ML (ref 22–58)
ECHO LV ESV INDEX A2C: 28.9 ML/M2
ECHO LV ESV INDEX A4C: 16.8 ML/M2
ECHO LV ESV INDEX BP: 22.9 ML/M2
ECHO LV INTERNAL DIMENSION DIASTOLIC: 3.93 CM (ref 4.2–5.9)
ECHO LV INTERNAL DIMENSION SYSTOLIC: 2.74 CM
ECHO LV IVSD: 1.32 CM (ref 0.6–1)
ECHO LV MASS 2D: 166.2 G (ref 88–224)
ECHO LV MASS INDEX 2D: 78.5 G/M2 (ref 49–115)
ECHO LV POSTERIOR WALL DIASTOLIC: 1.13 CM (ref 0.6–1)
ECHO LVOT DIAM: 2.39 CM
ECHO LVOT PEAK GRADIENT: 5.25 MMHG
ECHO LVOT PEAK VELOCITY: 114.53 CM/S
ECHO LVOT SV: 126 ML
ECHO LVOT VTI: 28.17 CM
ECHO MV A VELOCITY: 127.35 CM/S
ECHO MV AREA PHT: 1.95 CM2
ECHO MV AREA VTI: 4.09 CM2
ECHO MV E DECELERATION TIME (DT): 388.4 MS
ECHO MV E VELOCITY: 79.83 CM/S
ECHO MV E/A RATIO: 0.63
ECHO MV E/E' LATERAL: 10.73
ECHO MV E/E' RATIO (AVERAGED): 11.58
ECHO MV E/E' SEPTAL: 12.43
ECHO MV MAX VELOCITY: 123.07 CM/S
ECHO MV MEAN GRADIENT: 2.42 MMHG
ECHO MV PEAK GRADIENT: 6.06 MMHG
ECHO MV PRESSURE HALF TIME (PHT): 112.64 MS
ECHO MV VTI: 30.81 CM
ECHO RA MAJOR AXIS: 4.31 CM
ECHO RV INTERNAL DIMENSION: 4.35 CM
ECHO RV TAPSE: 2.49 CM (ref 1.5–2)
LA VOL DISK BP: 88.78 ML (ref 18–58)
LVOT MG: 3.19 MMHG

## 2020-12-09 PROCEDURE — G8752 SYS BP LESS 140: HCPCS | Performed by: INTERNAL MEDICINE

## 2020-12-09 PROCEDURE — G8754 DIAS BP LESS 90: HCPCS | Performed by: INTERNAL MEDICINE

## 2020-12-09 PROCEDURE — G8510 SCR DEP NEG, NO PLAN REQD: HCPCS | Performed by: INTERNAL MEDICINE

## 2020-12-09 PROCEDURE — G8419 CALC BMI OUT NRM PARAM NOF/U: HCPCS | Performed by: INTERNAL MEDICINE

## 2020-12-09 PROCEDURE — 93306 TTE W/DOPPLER COMPLETE: CPT | Performed by: INTERNAL MEDICINE

## 2020-12-09 PROCEDURE — G8427 DOCREV CUR MEDS BY ELIG CLIN: HCPCS | Performed by: INTERNAL MEDICINE

## 2020-12-09 PROCEDURE — G8536 NO DOC ELDER MAL SCRN: HCPCS | Performed by: INTERNAL MEDICINE

## 2020-12-09 PROCEDURE — 99214 OFFICE O/P EST MOD 30 MIN: CPT | Performed by: INTERNAL MEDICINE

## 2020-12-09 PROCEDURE — 1101F PT FALLS ASSESS-DOCD LE1/YR: CPT | Performed by: INTERNAL MEDICINE

## 2020-12-09 RX ORDER — TAMSULOSIN HYDROCHLORIDE 0.4 MG/1
CAPSULE ORAL
Qty: 90 CAP | Refills: 0 | Status: SHIPPED | OUTPATIENT
Start: 2020-12-09 | End: 2021-03-08 | Stop reason: SDUPTHER

## 2020-12-09 NOTE — PROGRESS NOTES
[facility] [place]: [place]  [phone]    History of Present Illness:   Mr. Silverio Stanley is 78 yo M with mild to moderate aortic stenosis, RBBB, COPD, HTN. Echo 2018 with normal LV function with moderate aortic stenosis, mean gradient of 24 mmHg. This is slightly increased from 17 mmHg in 2016. H/o bladder cancer and follows Dr Varsha Bush s/p MAURICE. Followed by hematology for chronic thrombocytopenia. Since his last visit, overall he has been doing okay. He did have recent UTI, but has been recovering. He denies any exertional chest pain. His breathing has been stable. No significant palpitations, lightheadedness or dizziness. He is still trying to cut back on tobacco use. He is compensated on exam with clear lungs, III/VI systolic murmur at the left sternal border. His echocardiogram today demonstrated continued preserved LV function, but he does have moderate aortic stenosis and his gradient is increased from 29 to31 mmHg and we discussed the results. Soc hx. ; his wife is followed by Dr. Forrest Santamaria and Plan:    1. Moderate aortic stenosis. Continues asymptomatic. I am concerned his gradient continues to trend up. Will have him follow back with a same day echocardiogram in six months and reassess. We did talk about the possibility of TAVR in the future, but not indicated at this time. 2. Essential hypertension. Blood pressure is controlled. 3. Tobacco use. Encouraged cessation. 4. COPD. He  has a past medical history of COPD (chronic obstructive pulmonary disease) ([address] Diabetes ([address] Enlarged prostate, Moderate aortic stenosis, and Pneumonia. All other systems negative except as above. PE  There were no vitals filed for this visit. There is no height or weight on file to calculate BMI.    General appearance - alert, well appearing, and in no distress  Mental status - affect appropriate to mood  Eyes - sclera anicteric, moist mucous membranes  Neck - supple, no JVD  Chest - clear to auscultation, no wheezes, rales or rhonchi  Heart - normal rate, regular rhythm, normal S1, S2, II-III/IV systolic murmur LUSB  Abdomen - soft, nontender, nondistended, no masses or organomegaly  Extremities - peripheral pulses normal, no pedal edema    Recent Labs:  Lab Results   Component Value Date/Time    Cholesterol, total 162 11/19/2020 12:11 PM    HDL Cholesterol 46 11/19/2020 12:11 PM    LDL, calculated 95.6 11/19/2020 12:11 PM    Triglyceride 102 11/19/2020 12:11 PM    CHOL/HDL Ratio 3.5 11/19/2020 12:11 PM     Lab Results   Component Value Date/Time    Creatinine 1.30 11/19/2020 12:11 PM     Lab Results   Component Value Date/Time    BUN 28 (H) 11/19/2020 12:11 PM     Lab Results   Component Value Date/Time    Potassium 4.8 11/19/2020 12:11 PM     Lab Results   Component Value Date/Time    Hemoglobin A1c 5.4 11/19/2020 12:11 PM     Lab Results   Component Value Date/Time    HGB 10.9 (L) 11/19/2020 12:11 PM     Lab Results   Component Value Date/Time    PLATELET 56 (L) 26/55/9095 12:11 PM       Reviewed:  Past Medical History:   Diagnosis Date    [facility] disease) [facility] [address]     Diabetes ([facility] [address]     Enlarged prostate     Moderate aortic stenosis     echo June 2016 Diony     Pneumonia      Social History     Tobacco Use   Smoking Status Current Every Day Smoker    Packs/day: 0.50   Smokeless Tobacco Never Used   Tobacco Comment    1 pack a day  ( 16-18 ) a day      Social History     Substance and Sexual Activity   Alcohol Use Yes    Alcohol/week: 14.0 standard drinks    Types: 14 Cans of beer per week    Comment: Social      No Known Allergies    Current Outpatient Medications   Medication Sig    cefUROXime (CEFTIN) 250 mg tablet Take 1 Tab by mouth two (2) times a day.  nicotine (NICODERM CQ) 21 mg/24 hr 1 Patch by TransDERmal route every twenty-four (24) hours for 30 days.     gabapentin (NEURONTIN) 300 mg capsule TAKE 1 CAPSULE BY MOUTH EVERY NIGHT    Insulin Syringe-Needle U-100 1/2 mL 30 gauge syrg USE TO INJECT 10 UNITS SUBCUTANEOUS TWICE DAILY    furosemide (LASIX) 40 mg tablet TAKE 1 TABLET BY MOUTH DAILY    tamsulosin (FLOMAX) 0.4 mg capsule TAKE 1 CAPSULE BY MOUTH DAILY    insulin NPH/insulin regular (HumuLIN 70/30 U-100 Insulin) 100 unit/mL (70-30) injection INJECT 10 UNITS UNDER THE SKIN BEFORE BREAKFAST AND DINNER. DISCARD AFTER 28 DAYS FROM FIRST USE    potassium chloride (K-DUR, KLOR-CON) 20 mEq tablet TAKE 1 TABLET BY MOUTH EVERY DAY    metFORMIN ER (GLUCOPHAGE XR) 500 mg tablet TAKE 1 TABLET BY MOUTH DAILY WITH DINNER    glucose blood VI test strips (LIBRADO BLOOD GLUCOSE TEST STRIP) strip USE TO TEST TWICE DAILY    multivit with minerals/lutein (MULTIVITAMIN 50 PLUS PO) Take  by mouth.  albuterol (PROAIR HFA) 90 mcg/actuation inhaler Take 1 Puff by inhalation.  mometasone-formoterol (DULERA) 100-5 mcg/actuation HFA inhaler INL 2 PFS PO BID    ONETOUCH ULTRA TEST strip USE TO TEST TWICE DAILY     No current facility-administered medications for this visit.         Mariah Marcelino MD  [facility] heart and [facility] [place]  [address], [address] [facility] [address]  [facility], [address]

## 2020-12-22 ENCOUNTER — TELEPHONE (OUTPATIENT)
Dept: FAMILY MEDICINE CLINIC | Age: 80
End: 2020-12-22

## 2020-12-22 DIAGNOSIS — Z79.4 TYPE 2 DIABETES MELLITUS WITH DIABETIC NEUROPATHY, WITH LONG-TERM CURRENT USE OF INSULIN (HCC): ICD-10-CM

## 2020-12-22 DIAGNOSIS — E11.40 TYPE 2 DIABETES MELLITUS WITH DIABETIC NEUROPATHY, WITH LONG-TERM CURRENT USE OF INSULIN (HCC): ICD-10-CM

## 2020-12-22 RX ORDER — SYRINGE-NEEDLE,INSULIN,0.5 ML 31 GX5/16"
SYRINGE, EMPTY DISPOSABLE MISCELLANEOUS
Qty: 100 SYRINGE | Refills: 0 | Status: SHIPPED | OUTPATIENT
Start: 2020-12-22 | End: 2021-02-17 | Stop reason: SDUPTHER

## 2020-12-22 NOTE — TELEPHONE ENCOUNTER
Loraine Srivastava (pt's life caregiver) called stating As of January 1st his insurance is no longer going to cover his insulin, only at [facility]    We need to send the Novolin 70/30  (in place of the insulin) to local [facility]    As well as  bd ultra fine syringes    Local [facility] to pt: [facility] at [address] [place] [address].  [address]# [phone]

## 2021-01-01 ENCOUNTER — OFFICE VISIT (OUTPATIENT)
Dept: CARDIOLOGY CLINIC | Age: 81
End: 2021-01-01
Payer: MEDICARE

## 2021-01-01 ENCOUNTER — OFFICE VISIT (OUTPATIENT)
Dept: FAMILY MEDICINE CLINIC | Age: 81
End: 2021-01-01
Payer: MEDICARE

## 2021-01-01 VITALS
BODY MASS INDEX: 27.3 KG/M2 | HEIGHT: 71 IN | RESPIRATION RATE: 16 BRPM | SYSTOLIC BLOOD PRESSURE: 110 MMHG | HEART RATE: 68 BPM | WEIGHT: 195 LBS | DIASTOLIC BLOOD PRESSURE: 70 MMHG | OXYGEN SATURATION: 99 %

## 2021-01-01 VITALS
RESPIRATION RATE: 16 BRPM | DIASTOLIC BLOOD PRESSURE: 67 MMHG | WEIGHT: 195.6 LBS | HEIGHT: 71 IN | HEART RATE: 71 BPM | BODY MASS INDEX: 27.38 KG/M2 | TEMPERATURE: 98.2 F | SYSTOLIC BLOOD PRESSURE: 115 MMHG | OXYGEN SATURATION: 98 %

## 2021-01-01 DIAGNOSIS — E11.21 TYPE 2 DIABETES WITH NEPHROPATHY (HCC): ICD-10-CM

## 2021-01-01 DIAGNOSIS — Z79.4 TYPE 2 DIABETES MELLITUS WITH DIABETIC NEUROPATHY, WITH LONG-TERM CURRENT USE OF INSULIN (HCC): ICD-10-CM

## 2021-01-01 DIAGNOSIS — D69.6 THROMBOCYTOPENIA (HCC): ICD-10-CM

## 2021-01-01 DIAGNOSIS — N40.1 BENIGN PROSTATIC HYPERPLASIA WITH URINARY FREQUENCY: ICD-10-CM

## 2021-01-01 DIAGNOSIS — R35.0 BENIGN PROSTATIC HYPERPLASIA WITH URINARY FREQUENCY: ICD-10-CM

## 2021-01-01 DIAGNOSIS — E11.40 TYPE 2 DIABETES MELLITUS WITH DIABETIC NEUROPATHY, WITH LONG-TERM CURRENT USE OF INSULIN (HCC): ICD-10-CM

## 2021-01-01 DIAGNOSIS — F17.210 SMOKING GREATER THAN 20 PACK YEARS: ICD-10-CM

## 2021-01-01 DIAGNOSIS — I35.0 NONRHEUMATIC AORTIC VALVE STENOSIS: ICD-10-CM

## 2021-01-01 DIAGNOSIS — Z87.891 HISTORY OF TOBACCO USE: ICD-10-CM

## 2021-01-01 DIAGNOSIS — Z79.4 CONTROLLED TYPE 2 DIABETES MELLITUS WITH DIABETIC AUTONOMIC NEUROPATHY, WITH LONG-TERM CURRENT USE OF INSULIN (HCC): ICD-10-CM

## 2021-01-01 DIAGNOSIS — I10 BENIGN ESSENTIAL HTN: ICD-10-CM

## 2021-01-01 DIAGNOSIS — E11.43 CONTROLLED TYPE 2 DIABETES MELLITUS WITH DIABETIC AUTONOMIC NEUROPATHY, WITH LONG-TERM CURRENT USE OF INSULIN (HCC): ICD-10-CM

## 2021-01-01 DIAGNOSIS — E11.21 TYPE 2 DIABETES WITH NEPHROPATHY (HCC): Primary | ICD-10-CM

## 2021-01-01 DIAGNOSIS — I35.0 MODERATE AORTIC STENOSIS: Primary | ICD-10-CM

## 2021-01-01 DIAGNOSIS — Z85.51 HISTORY OF BLADDER CANCER: ICD-10-CM

## 2021-01-01 DIAGNOSIS — J43.9 PULMONARY EMPHYSEMA, UNSPECIFIED EMPHYSEMA TYPE (HCC): ICD-10-CM

## 2021-01-01 LAB
ALBUMIN SERPL-MCNC: 3.7 G/DL (ref 3.5–5)
ALBUMIN/GLOB SERPL: 1.1 {RATIO} (ref 1.1–2.2)
ALP SERPL-CCNC: 73 U/L (ref 45–117)
ALT SERPL-CCNC: 21 U/L (ref 12–78)
ANION GAP SERPL CALC-SCNC: 3 MMOL/L (ref 5–15)
AST SERPL-CCNC: 22 U/L (ref 15–37)
BASOPHILS # BLD: 0 K/UL (ref 0–0.1)
BASOPHILS NFR BLD: 1 % (ref 0–1)
BILIRUB SERPL-MCNC: 0.7 MG/DL (ref 0.2–1)
BUN SERPL-MCNC: 21 MG/DL (ref 6–20)
BUN/CREAT SERPL: 19 (ref 12–20)
CALCIUM SERPL-MCNC: 9.3 MG/DL (ref 8.5–10.1)
CHLORIDE SERPL-SCNC: 107 MMOL/L (ref 97–108)
CHOLEST SERPL-MCNC: 159 MG/DL
CO2 SERPL-SCNC: 28 MMOL/L (ref 21–32)
CREAT SERPL-MCNC: 1.12 MG/DL (ref 0.7–1.3)
CREAT UR-MCNC: 133 MG/DL
DIFFERENTIAL METHOD BLD: ABNORMAL
EOSINOPHIL # BLD: 0.2 K/UL (ref 0–0.4)
EOSINOPHIL NFR BLD: 4 % (ref 0–7)
ERYTHROCYTE [DISTWIDTH] IN BLOOD BY AUTOMATED COUNT: 20.7 % (ref 11.5–14.5)
EST. AVERAGE GLUCOSE BLD GHB EST-MCNC: 105 MG/DL
GLOBULIN SER CALC-MCNC: 3.5 G/DL (ref 2–4)
GLUCOSE SERPL-MCNC: 117 MG/DL (ref 65–100)
HBA1C MFR BLD: 5.3 % (ref 4–5.6)
HCT VFR BLD AUTO: 30.4 % (ref 36.6–50.3)
HDLC SERPL-MCNC: 37 MG/DL
HDLC SERPL: 4.3 {RATIO} (ref 0–5)
HGB BLD-MCNC: 11.3 G/DL (ref 12.1–17)
IMM GRANULOCYTES # BLD AUTO: 0 K/UL (ref 0–0.04)
IMM GRANULOCYTES NFR BLD AUTO: 1 % (ref 0–0.5)
LDLC SERPL CALC-MCNC: 92.8 MG/DL (ref 0–100)
LYMPHOCYTES # BLD: 1 K/UL (ref 0.8–3.5)
LYMPHOCYTES NFR BLD: 22 % (ref 12–49)
MCH RBC QN AUTO: 37.9 PG (ref 26–34)
MCHC RBC AUTO-ENTMCNC: 37.2 G/DL (ref 30–36.5)
MCV RBC AUTO: 102 FL (ref 80–99)
MICROALBUMIN UR-MCNC: 33.7 MG/DL
MICROALBUMIN/CREAT UR-RTO: 253 MG/G (ref 0–30)
MONOCYTES # BLD: 0.4 K/UL (ref 0–1)
MONOCYTES NFR BLD: 9 % (ref 5–13)
NEUTS SEG # BLD: 3.1 K/UL (ref 1.8–8)
NEUTS SEG NFR BLD: 63 % (ref 32–75)
NRBC # BLD: 0.02 K/UL (ref 0–0.01)
NRBC BLD-RTO: 0.4 PER 100 WBC
PLATELET # BLD AUTO: 87 K/UL (ref 150–400)
POTASSIUM SERPL-SCNC: 4.5 MMOL/L (ref 3.5–5.1)
PROT SERPL-MCNC: 7.2 G/DL (ref 6.4–8.2)
RBC # BLD AUTO: 2.98 M/UL (ref 4.1–5.7)
RBC MORPH BLD: ABNORMAL
RBC MORPH BLD: ABNORMAL
SODIUM SERPL-SCNC: 138 MMOL/L (ref 136–145)
TRIGL SERPL-MCNC: 146 MG/DL (ref ?–150)
VLDLC SERPL CALC-MCNC: 29.2 MG/DL
WBC # BLD AUTO: 4.7 K/UL (ref 4.1–11.1)

## 2021-01-01 PROCEDURE — G8536 NO DOC ELDER MAL SCRN: HCPCS | Performed by: INTERNAL MEDICINE

## 2021-01-01 PROCEDURE — G8754 DIAS BP LESS 90: HCPCS | Performed by: INTERNAL MEDICINE

## 2021-01-01 PROCEDURE — G8752 SYS BP LESS 140: HCPCS | Performed by: INTERNAL MEDICINE

## 2021-01-01 PROCEDURE — G8510 SCR DEP NEG, NO PLAN REQD: HCPCS | Performed by: INTERNAL MEDICINE

## 2021-01-01 PROCEDURE — 93000 ELECTROCARDIOGRAM COMPLETE: CPT | Performed by: INTERNAL MEDICINE

## 2021-01-01 PROCEDURE — 99214 OFFICE O/P EST MOD 30 MIN: CPT | Performed by: INTERNAL MEDICINE

## 2021-01-01 PROCEDURE — G8419 CALC BMI OUT NRM PARAM NOF/U: HCPCS | Performed by: INTERNAL MEDICINE

## 2021-01-01 PROCEDURE — G8510 SCR DEP NEG, NO PLAN REQD: HCPCS | Performed by: FAMILY MEDICINE

## 2021-01-01 PROCEDURE — G8427 DOCREV CUR MEDS BY ELIG CLIN: HCPCS | Performed by: FAMILY MEDICINE

## 2021-01-01 PROCEDURE — G8754 DIAS BP LESS 90: HCPCS | Performed by: FAMILY MEDICINE

## 2021-01-01 PROCEDURE — 1101F PT FALLS ASSESS-DOCD LE1/YR: CPT | Performed by: FAMILY MEDICINE

## 2021-01-01 PROCEDURE — 99214 OFFICE O/P EST MOD 30 MIN: CPT | Performed by: FAMILY MEDICINE

## 2021-01-01 PROCEDURE — G8536 NO DOC ELDER MAL SCRN: HCPCS | Performed by: FAMILY MEDICINE

## 2021-01-01 PROCEDURE — G8419 CALC BMI OUT NRM PARAM NOF/U: HCPCS | Performed by: FAMILY MEDICINE

## 2021-01-01 PROCEDURE — G8752 SYS BP LESS 140: HCPCS | Performed by: FAMILY MEDICINE

## 2021-01-01 PROCEDURE — G8427 DOCREV CUR MEDS BY ELIG CLIN: HCPCS | Performed by: INTERNAL MEDICINE

## 2021-01-01 PROCEDURE — 1101F PT FALLS ASSESS-DOCD LE1/YR: CPT | Performed by: INTERNAL MEDICINE

## 2021-01-01 RX ORDER — TAMSULOSIN HYDROCHLORIDE 0.4 MG/1
0.4 CAPSULE ORAL DAILY
Qty: 90 CAPSULE | Refills: 1 | Status: SHIPPED | OUTPATIENT
Start: 2021-01-01 | End: 2021-01-01

## 2021-01-01 RX ORDER — GABAPENTIN 300 MG/1
CAPSULE ORAL
Qty: 30 CAPSULE | Refills: 0 | Status: SHIPPED | OUTPATIENT
Start: 2021-01-01 | End: 2021-01-01

## 2021-01-01 RX ORDER — IBUPROFEN 200 MG
TABLET ORAL
Qty: 28 PATCH | Refills: 1 | Status: SHIPPED | OUTPATIENT
Start: 2021-01-01 | End: 2021-01-01

## 2021-01-01 RX ORDER — GABAPENTIN 300 MG/1
CAPSULE ORAL
Qty: 30 CAPSULE | Refills: 0 | Status: SHIPPED | OUTPATIENT
Start: 2021-01-01 | End: 2022-01-01

## 2021-01-01 RX ORDER — IBUPROFEN 200 MG
TABLET ORAL
Qty: 28 PATCH | Refills: 1 | Status: SHIPPED | OUTPATIENT
Start: 2021-01-01 | End: 2022-01-01

## 2021-01-01 RX ORDER — TAMSULOSIN HYDROCHLORIDE 0.4 MG/1
0.4 CAPSULE ORAL DAILY
Qty: 90 CAPSULE | Refills: 1 | Status: SHIPPED | OUTPATIENT
Start: 2021-01-01 | End: 2022-01-01

## 2021-01-01 RX ORDER — PEN NEEDLE, DIABETIC 29 G X1/2"
NEEDLE, DISPOSABLE MISCELLANEOUS
Qty: 60 EACH | Refills: 5 | Status: SHIPPED | OUTPATIENT
Start: 2021-01-01 | End: 2022-01-01

## 2021-02-02 DIAGNOSIS — E11.40 TYPE 2 DIABETES MELLITUS WITH DIABETIC NEUROPATHY, WITH LONG-TERM CURRENT USE OF INSULIN (HCC): ICD-10-CM

## 2021-02-02 DIAGNOSIS — Z79.4 TYPE 2 DIABETES MELLITUS WITH DIABETIC NEUROPATHY, WITH LONG-TERM CURRENT USE OF INSULIN (HCC): ICD-10-CM

## 2021-02-02 RX ORDER — GABAPENTIN 300 MG/1
300 CAPSULE ORAL
Qty: 30 CAP | Refills: 0 | Status: SHIPPED | OUTPATIENT
Start: 2021-02-02 | End: 2021-03-08 | Stop reason: SDUPTHER

## 2021-02-02 NOTE — TELEPHONE ENCOUNTER
Patient called for refill    .   Requested Prescriptions     Pending Prescriptions Disp Refills    gabapentin (NEURONTIN) 300 mg capsule 30 Cap 0     Sig: TAKE 1 CAPSULE BY MOUTH EVERY NIGHT       Best call back # [phone]

## 2021-02-02 NOTE — TELEPHONE ENCOUNTER
No access to      Last visit 11/19/2020 MD Bandar Cesar   Next appointment 03/17/2021 MD Bandar Cesar   Previous refill encounter(s)   11/19/2020 Neurontin #30     Requested Prescriptions     Pending Prescriptions Disp Refills    gabapentin (NEURONTIN) 300 mg capsule 30 Cap 0     Sig: Take 1 Cap by mouth nightly. Max Daily Amount: 300 mg.

## 2021-02-17 DIAGNOSIS — Z79.4 TYPE 2 DIABETES MELLITUS WITH DIABETIC NEUROPATHY, WITH LONG-TERM CURRENT USE OF INSULIN (HCC): ICD-10-CM

## 2021-02-17 DIAGNOSIS — E11.40 TYPE 2 DIABETES MELLITUS WITH DIABETIC NEUROPATHY, WITH LONG-TERM CURRENT USE OF INSULIN (HCC): ICD-10-CM

## 2021-02-17 RX ORDER — SYRINGE-NEEDLE,INSULIN,0.5 ML 31 GX5/16"
SYRINGE, EMPTY DISPOSABLE MISCELLANEOUS
Qty: 100 SYRINGE | Refills: 0 | Status: SHIPPED | OUTPATIENT
Start: 2021-02-17 | End: 2021-03-08 | Stop reason: SDUPTHER

## 2021-02-17 NOTE — TELEPHONE ENCOUNTER
Last visit 11/19/2020 MD Su Wick   Next appointment 03/17/2021 MD Su Wick   Previous refill encounter(s)   12/22/2020 BD Insulin Syringe #100     Requested Prescriptions     Pending Prescriptions Disp Refills    Insulin Syringe-Needle U-100 (BD Insulin Syringe) 0.3 mL 29 gauge x 1/2\" syrg 100 Syringe 0     Sig: Use twice daily to inject Insulin

## 2021-02-27 DIAGNOSIS — I35.0 NONRHEUMATIC AORTIC VALVE STENOSIS: ICD-10-CM

## 2021-02-27 DIAGNOSIS — R60.9 EDEMA, UNSPECIFIED TYPE: ICD-10-CM

## 2021-02-28 RX ORDER — FUROSEMIDE 40 MG/1
TABLET ORAL
Qty: 90 TAB | Refills: 1 | Status: SHIPPED | OUTPATIENT
Start: 2021-02-28 | End: 2021-03-08 | Stop reason: SDUPTHER

## 2021-03-08 DIAGNOSIS — E11.40 TYPE 2 DIABETES MELLITUS WITH DIABETIC NEUROPATHY, WITH LONG-TERM CURRENT USE OF INSULIN (HCC): ICD-10-CM

## 2021-03-08 DIAGNOSIS — N40.1 BENIGN PROSTATIC HYPERPLASIA WITH URINARY FREQUENCY: ICD-10-CM

## 2021-03-08 DIAGNOSIS — R60.9 EDEMA, UNSPECIFIED TYPE: ICD-10-CM

## 2021-03-08 DIAGNOSIS — R35.0 BENIGN PROSTATIC HYPERPLASIA WITH URINARY FREQUENCY: ICD-10-CM

## 2021-03-08 DIAGNOSIS — Z79.4 TYPE 2 DIABETES MELLITUS WITH DIABETIC NEUROPATHY, WITH LONG-TERM CURRENT USE OF INSULIN (HCC): ICD-10-CM

## 2021-03-08 DIAGNOSIS — I35.0 NONRHEUMATIC AORTIC VALVE STENOSIS: ICD-10-CM

## 2021-03-08 RX ORDER — GABAPENTIN 300 MG/1
300 CAPSULE ORAL
Qty: 90 CAP | Refills: 0 | Status: SHIPPED | OUTPATIENT
Start: 2021-03-08 | End: 2021-03-14

## 2021-03-08 RX ORDER — POTASSIUM CHLORIDE 20 MEQ/1
20 TABLET, EXTENDED RELEASE ORAL DAILY
Qty: 90 TAB | Refills: 3 | Status: SHIPPED | OUTPATIENT
Start: 2021-03-08 | End: 2021-03-17 | Stop reason: SDUPTHER

## 2021-03-08 RX ORDER — FUROSEMIDE 40 MG/1
40 TABLET ORAL DAILY
Qty: 90 TAB | Refills: 1 | Status: SHIPPED | OUTPATIENT
Start: 2021-03-08 | End: 2021-03-17 | Stop reason: SDUPTHER

## 2021-03-08 RX ORDER — TAMSULOSIN HYDROCHLORIDE 0.4 MG/1
0.4 CAPSULE ORAL DAILY
Qty: 90 CAP | Refills: 0 | Status: SHIPPED | OUTPATIENT
Start: 2021-03-08 | End: 2021-01-01 | Stop reason: SDUPTHER

## 2021-03-08 RX ORDER — SYRINGE-NEEDLE,INSULIN,0.5 ML 31 GX5/16"
SYRINGE, EMPTY DISPOSABLE MISCELLANEOUS
Qty: 200 SYRINGE | Refills: 0 | Status: SHIPPED | OUTPATIENT
Start: 2021-03-08 | End: 2021-03-17 | Stop reason: SDUPTHER

## 2021-03-08 RX ORDER — METFORMIN HYDROCHLORIDE 500 MG/1
500 TABLET, EXTENDED RELEASE ORAL
Qty: 90 TAB | Refills: 1 | Status: SHIPPED | OUTPATIENT
Start: 2021-03-08

## 2021-03-08 NOTE — TELEPHONE ENCOUNTER
Request from [facility] for new rx's. Check  for Gabapentin. More requests to follow. Thanks, Myra    Last Visit: 11/19/20 MD Rin Palacios  Next Appointment: 3/17/21 MD Rin Palacios  Previous Refill Encounter(s):   Gabapentin 2/2/21 30  tamsulosin 12/9/20 90  Potassium Chloride 2/13/20 90 + 3    Requested Prescriptions     Pending Prescriptions Disp Refills    gabapentin (NEURONTIN) 300 mg capsule 90 Cap 0     Sig: Take 1 Cap by mouth nightly. Max Daily Amount: 300 mg.  tamsulosin (FLOMAX) 0.4 mg capsule 90 Cap 0     Sig: Take 1 Cap by mouth daily.  potassium chloride (K-DUR, KLOR-CON) 20 mEq tablet 90 Tab 3     Sig: Take 1 Tab by mouth daily.

## 2021-03-08 NOTE — TELEPHONE ENCOUNTER
Additional requests for [facility] mail order. Thanks, Myra    Last Visit: 11/19/20 MD Stephy Hill  Next Appointment: 3/17/21 MD Stephy Hill  Previous Refill Encounter(s):   [facility]  Metformin 2/13/20 90 + 1   Insulin syringes 2/17/21 [facility]    Requested Prescriptions     Pending Prescriptions Disp Refills    furosemide (LASIX) 40 mg tablet 90 Tab 1     Sig: Take 1 Tab by mouth daily.  metFORMIN ER (GLUCOPHAGE XR) 500 mg tablet 90 Tab 1     Sig: Take 1 Tab by mouth daily (with dinner).     Insulin Syringe-Needle U-100 (BD Insulin Syringe) 0.3 mL 29 gauge x 1/2\" syrg 200 Syringe 0     Sig: Use twice daily to inject Insulin

## 2021-03-10 DIAGNOSIS — Z79.4 TYPE 2 DIABETES MELLITUS WITH DIABETIC NEUROPATHY, WITH LONG-TERM CURRENT USE OF INSULIN (HCC): ICD-10-CM

## 2021-03-10 DIAGNOSIS — E11.40 TYPE 2 DIABETES MELLITUS WITH DIABETIC NEUROPATHY, WITH LONG-TERM CURRENT USE OF INSULIN (HCC): ICD-10-CM

## 2021-03-10 RX ORDER — NAPHAZOLINE HYDROCHLORIDE AND POLYETHYLENE GLYCOL 300 .1; 2 MG/ML; MG/ML
SOLUTION/ DROPS OPHTHALMIC
Qty: 60 SYRINGE | Refills: 1 | Status: SHIPPED | OUTPATIENT
Start: 2021-03-10 | End: 2021-03-17 | Stop reason: SDUPTHER

## 2021-03-13 DIAGNOSIS — Z79.4 TYPE 2 DIABETES MELLITUS WITH DIABETIC NEUROPATHY, WITH LONG-TERM CURRENT USE OF INSULIN (HCC): ICD-10-CM

## 2021-03-13 DIAGNOSIS — E11.40 TYPE 2 DIABETES MELLITUS WITH DIABETIC NEUROPATHY, WITH LONG-TERM CURRENT USE OF INSULIN (HCC): ICD-10-CM

## 2021-03-14 RX ORDER — GABAPENTIN 300 MG/1
CAPSULE ORAL
Qty: 30 CAP | Refills: 0 | Status: SHIPPED | OUTPATIENT
Start: 2021-03-14 | End: 2021-04-26 | Stop reason: SDUPTHER

## 2021-03-15 RX ORDER — CALCIUM CARB/VITAMIN D3/VIT K1 500-100-40
TABLET,CHEWABLE ORAL
Qty: 200 SYRINGE | Refills: 1 | Status: SHIPPED | OUTPATIENT
Start: 2021-03-15 | End: 2022-01-01 | Stop reason: SDUPTHER

## 2021-03-15 NOTE — TELEPHONE ENCOUNTER
MD Joannie Osler,    Call from [phone] mail order stating they do not have the BD low dose syringe in the 29g. Asking to change to the 0.3ml BD syringe 31g. Entered new rx if appropriate. tio Calderon  Previous Refill Encounter(s): 3/8/21 60 + 1 (local [facility])    Requested Prescriptions     Pending Prescriptions Disp Refills    Insulin Syringe-Needle U-100 (BD Insulin Syringe Ultra-Fine) 0.3 mL 31 gauge x 5/16\" syrg 200 Syringe 1     Sig: Use twice daily to inject insulin.

## 2021-03-17 ENCOUNTER — OFFICE VISIT (OUTPATIENT)
Dept: FAMILY MEDICINE CLINIC | Age: 81
End: 2021-03-17
Payer: MEDICARE

## 2021-03-17 VITALS
HEART RATE: 66 BPM | WEIGHT: 199.4 LBS | RESPIRATION RATE: 18 BRPM | HEIGHT: 71 IN | DIASTOLIC BLOOD PRESSURE: 72 MMHG | OXYGEN SATURATION: 96 % | SYSTOLIC BLOOD PRESSURE: 128 MMHG | BODY MASS INDEX: 27.92 KG/M2 | TEMPERATURE: 98.6 F

## 2021-03-17 DIAGNOSIS — Z79.4 CONTROLLED TYPE 2 DIABETES MELLITUS WITH DIABETIC AUTONOMIC NEUROPATHY, WITH LONG-TERM CURRENT USE OF INSULIN (HCC): ICD-10-CM

## 2021-03-17 DIAGNOSIS — I35.0 NONRHEUMATIC AORTIC VALVE STENOSIS: ICD-10-CM

## 2021-03-17 DIAGNOSIS — E11.43 CONTROLLED TYPE 2 DIABETES MELLITUS WITH DIABETIC AUTONOMIC NEUROPATHY, WITH LONG-TERM CURRENT USE OF INSULIN (HCC): ICD-10-CM

## 2021-03-17 DIAGNOSIS — Z00.00 MEDICARE ANNUAL WELLNESS VISIT, SUBSEQUENT: Primary | ICD-10-CM

## 2021-03-17 DIAGNOSIS — Z71.89 ADVANCED DIRECTIVES, COUNSELING/DISCUSSION: ICD-10-CM

## 2021-03-17 DIAGNOSIS — E11.21 TYPE 2 DIABETES WITH NEPHROPATHY (HCC): ICD-10-CM

## 2021-03-17 DIAGNOSIS — D69.6 THROMBOCYTOPENIA (HCC): ICD-10-CM

## 2021-03-17 DIAGNOSIS — J43.9 PULMONARY EMPHYSEMA, UNSPECIFIED EMPHYSEMA TYPE (HCC): ICD-10-CM

## 2021-03-17 DIAGNOSIS — R60.9 EDEMA, UNSPECIFIED TYPE: ICD-10-CM

## 2021-03-17 DIAGNOSIS — Z13.31 SCREENING FOR DEPRESSION: ICD-10-CM

## 2021-03-17 LAB
ALBUMIN SERPL-MCNC: 3.8 G/DL (ref 3.5–5)
ALBUMIN/GLOB SERPL: 1.1 {RATIO} (ref 1.1–2.2)
ALP SERPL-CCNC: 66 U/L (ref 45–117)
ALT SERPL-CCNC: 17 U/L (ref 12–78)
ANION GAP SERPL CALC-SCNC: 5 MMOL/L (ref 5–15)
AST SERPL-CCNC: 20 U/L (ref 15–37)
BILIRUB SERPL-MCNC: 0.7 MG/DL (ref 0.2–1)
BUN SERPL-MCNC: 20 MG/DL (ref 6–20)
BUN/CREAT SERPL: 19 (ref 12–20)
CALCIUM SERPL-MCNC: 9 MG/DL (ref 8.5–10.1)
CHLORIDE SERPL-SCNC: 106 MMOL/L (ref 97–108)
CHOLEST SERPL-MCNC: 163 MG/DL
CO2 SERPL-SCNC: 27 MMOL/L (ref 21–32)
CREAT SERPL-MCNC: 1.05 MG/DL (ref 0.7–1.3)
CREAT UR-MCNC: 120 MG/DL
EST. AVERAGE GLUCOSE BLD GHB EST-MCNC: 94 MG/DL
GLOBULIN SER CALC-MCNC: 3.4 G/DL (ref 2–4)
GLUCOSE SERPL-MCNC: 87 MG/DL (ref 65–100)
HBA1C MFR BLD: 4.9 % (ref 4–5.6)
HDLC SERPL-MCNC: 37 MG/DL
HDLC SERPL: 4.4 {RATIO} (ref 0–5)
LDLC SERPL CALC-MCNC: 99.8 MG/DL (ref 0–100)
LIPID PROFILE,FLP: NORMAL
MICROALBUMIN UR-MCNC: 86.2 MG/DL
MICROALBUMIN/CREAT UR-RTO: 718 MG/G (ref 0–30)
POTASSIUM SERPL-SCNC: 5.1 MMOL/L (ref 3.5–5.1)
PROT SERPL-MCNC: 7.2 G/DL (ref 6.4–8.2)
SODIUM SERPL-SCNC: 138 MMOL/L (ref 136–145)
TRIGL SERPL-MCNC: 131 MG/DL (ref ?–150)
VLDLC SERPL CALC-MCNC: 26.2 MG/DL

## 2021-03-17 PROCEDURE — G8419 CALC BMI OUT NRM PARAM NOF/U: HCPCS | Performed by: FAMILY MEDICINE

## 2021-03-17 PROCEDURE — G8754 DIAS BP LESS 90: HCPCS | Performed by: FAMILY MEDICINE

## 2021-03-17 PROCEDURE — G0439 PPPS, SUBSEQ VISIT: HCPCS | Performed by: FAMILY MEDICINE

## 2021-03-17 PROCEDURE — 99497 ADVNCD CARE PLAN 30 MIN: CPT | Performed by: FAMILY MEDICINE

## 2021-03-17 PROCEDURE — G8752 SYS BP LESS 140: HCPCS | Performed by: FAMILY MEDICINE

## 2021-03-17 PROCEDURE — G8510 SCR DEP NEG, NO PLAN REQD: HCPCS | Performed by: FAMILY MEDICINE

## 2021-03-17 PROCEDURE — 99214 OFFICE O/P EST MOD 30 MIN: CPT | Performed by: FAMILY MEDICINE

## 2021-03-17 PROCEDURE — G8427 DOCREV CUR MEDS BY ELIG CLIN: HCPCS | Performed by: FAMILY MEDICINE

## 2021-03-17 PROCEDURE — 1101F PT FALLS ASSESS-DOCD LE1/YR: CPT | Performed by: FAMILY MEDICINE

## 2021-03-17 PROCEDURE — G8536 NO DOC ELDER MAL SCRN: HCPCS | Performed by: FAMILY MEDICINE

## 2021-03-17 PROCEDURE — G0444 DEPRESSION SCREEN ANNUAL: HCPCS | Performed by: FAMILY MEDICINE

## 2021-03-17 RX ORDER — FUROSEMIDE 40 MG/1
40 TABLET ORAL DAILY
Qty: 90 TAB | Refills: 1 | Status: SHIPPED | OUTPATIENT
Start: 2021-03-17 | End: 2022-01-01

## 2021-03-17 RX ORDER — POTASSIUM CHLORIDE 20 MEQ/1
20 TABLET, EXTENDED RELEASE ORAL DAILY
Qty: 90 TAB | Refills: 3 | Status: SHIPPED | OUTPATIENT
Start: 2021-03-17 | End: 2022-01-01

## 2021-03-17 NOTE — PROGRESS NOTES
Chief Complaint   Patient presents with   Alma Lopezgomery Annual Wellness Visit     follow up   [facility]       1. Have you been to the ER, urgent care clinic since your last visit? Hospitalized since your last visit? No    2. Have you seen or consulted any other health care providers outside of the [address] [place] [address] since your last visit? Include any pap smears or colon screening. No    3 most recent PHQ Screens 3/17/2021   PHQ Not Done -   Little interest or pleasure in doing things Not at all   Feeling down, depressed, irritable, or hopeless Not at all   Total Score PHQ 2 0       Abuse Screening Questionnaire 3/17/2021   Do you ever feel afraid of your partner? N   Are you in a relationship with someone who physically or mentally threatens you? N   Is it safe for you to go home? Y       ADL Assessment 3/17/2021   Feeding yourself No Help Needed   Getting from bed to chair No Help Needed   Getting dressed No Help Needed   Bathing or showering No Help Needed   Walk across the room (includes cane/walker) No Help Needed   Using the telphone No Help Needed   Taking your medications No Help Needed   Preparing meals No Help Needed   Managing money (expenses/bills) No Help Needed   Moderately strenuous housework (laundry) No Help Needed   Shopping for personal items (toiletries/medicines) No Help Needed   Shopping for groceries No Help Needed   Driving Help Needed   Climbing a flight of stairs No Help Needed   Getting to places beyond walking distances No Help Needed       Fall Risk Assessment, last 12 mths 3/17/2021   Able to walk? Yes   Fall in past 12 months? 0   Do you feel unsteady? 1   Are you worried about falling 0   Is TUG test greater than 12 seconds?  0   Is the gait abnormal? 0   Number of falls in past 12 months -   Fall with injury? -         Health Maintenance Due   Topic Date Due    Hepatitis C Screening  Never done    COVID-19 Vaccine (1) Never done    Shingrix Vaccine Age 50> (1 of 2) Never done   Alma Montgomery Medicare Yearly Exam  02/13/2021    MICROALBUMIN Q1  02/13/2021

## 2021-03-17 NOTE — ASSESSMENT & PLAN NOTE
Stable, based on history, physical exam and review of pertinent labs, studies and medications; meds reconciled; continue current treatment plan, lifestyle modifications recommended, medication compliance emphasized. , This condition is managed by Specialist. counseled on smoking cessation

## 2021-03-17 NOTE — ASSESSMENT & PLAN NOTE
Stable, based on history, physical exam and review of pertinent labs, studies and medications; meds reconciled; continue current treatment plan., This condition is managed by Specialist.

## 2021-03-17 NOTE — ACP (ADVANCE CARE PLANNING)
Advance Care Planning     Advance Care Planning (ACP) Physician/NP/PA Conversation      Date of Conversation: 3/17/2021  Conducted with: Patient with Decision Making Capacity    Healthcare Decision Maker:     Primary Decision Maker (Active): Milagro Parra - Child - [phone]    Secondary Decision Maker: Anna Freeman - Daughter - [phone]    Secondary Decision Maker: Willis Becker - Spouse - [phone]  Click here to complete [address] including selection of the Healthcare Decision Maker Relationship (ie \"Primary\")  Today we documented Decision Maker(s) consistent with Legal Next of Kin hierarchy. Care Preferences:    Hospitalization: \"If your health worsens and it becomes clear that your chance of recovery is unlikely, what would be your preference regarding hospitalization? \"  The patient would prefer comfort-focused treatment without hospitalization. Ventilation: \"If you were unable to breathe on your own and your chance of recovery was unlikely, what would be your preference about the use of a ventilator (breathing machine) if it was available to you? \"   The patient would NOT desire the use of a ventilator. Resuscitation: \"In the event your heart stopped as a result of an underlying serious health condition, would you want attempts to be made to restart your heart, or would you prefer a natural death? \"   No, do NOT attempt to resuscitate.     Additional topics discussed: treatment goals, benefit/burden of treatment options, artificial nutrition, ventilation preferences, hospitalization preferences, resuscitation preferences, end of life care preferences (vegetative state/imminent death) and hospice care    Conversation Outcomes / Follow-Up Plan:   ACP in process - information provided, considering goals and options  Reviewed DNR/DNI and patient elects DNR order - referred to ACP Clinical Specialist & placed order     Length of Voluntary ACP Conversation in minutes:  16 minutes    Patricia Colón MD     Patient does not want to be on ventilator or he also does not want to go to nursing home. Strongly recommended to complete directive forms those were provided today with instruction how to complete it.

## 2021-03-17 NOTE — PATIENT INSTRUCTIONS
Medicare Wellness Visit, Male The best way to live healthy is to have a lifestyle where you eat a well-balanced diet, exercise regularly, limit alcohol use, and quit all forms of tobacco/nicotine, if applicable. Regular preventive services are another way to keep healthy. Preventive services (vaccines, screening tests, monitoring & exams) can help personalize your care plan, which helps you manage your own care. Screening tests can find health problems at the earliest stages, when they are easiest to treat. [facility] follows the current, evidence-based guidelines published by the [facility] Marck Santana ([facility]) when recommending preventive services for our patients. Because we follow these guidelines, sometimes recommendations change over time as research supports it. (For example, a prostate screening blood test is no longer routinely recommended for men with no symptoms). Of course, you and your doctor may decide to screen more often for some diseases, based on your risk and co-morbidities (chronic disease you are already diagnosed with). Preventive services for you include: - Medicare offers their members a free annual wellness visit, which is time for you and your primary care provider to discuss and plan for your preventive service needs. Take advantage of this benefit every year! 
-All adults over age 72 should receive the recommended pneumonia vaccines. Current USPSTF guidelines recommend a series of two vaccines for the best pneumonia protection.  
-All adults should have a flu vaccine yearly and tetanus vaccine every 10 years. 
-All adults age 48 and older should receive the shingles vaccines (series of two vaccines).       
-All adults age 38-68 who are overweight should have a diabetes screening test once every three years.  
-Other screening tests & preventive services for persons with diabetes include: an eye exam to screen for diabetic retinopathy, a kidney function test, a foot exam, and stricter control over your cholesterol.  
-Cardiovascular screening for adults with routine risk involves an electrocardiogram (ECG) at intervals determined by the provider.  
-Colorectal cancer screening should be done for adults age 54-65 with no increased risk factors for colorectal cancer. There are a number of acceptable methods of screening for this type of cancer. Each test has its own benefits and drawbacks. Discuss with your provider what is most appropriate for you during your annual wellness visit. The different tests include: colonoscopy (considered the best screening method), a fecal occult blood test, a fecal DNA test, and sigmoidoscopy. 
-All adults born between [facility] should be screened once for Hepatitis C. 
-An Abdominal Aortic Aneurysm (AAA) Screening is recommended for men age 73-68 who has ever smoked in their lifetime. Here is a list of your current Health Maintenance items (your personalized list of preventive services) with a due date: 
Health Maintenance Due Topic Date Due  
 Hepatitis C Test  Never done  COVID-19 Vaccine (1) Never done  Shingles Vaccine (1 of 2) Never done  Albumin Urine Test  02/13/2021 Benedict Gray [address] What is a living will? A living will is a legal form you use to write down the kind of care you want at the end of your life. It is used by the health professionals who will treat you if you aren't able to decide for yourself. If you put your wishes in writing, your loved ones and others will know what kind of care you want. They won't need to guess. This can ease your mind and be helpful to others. A living will is not the same as an estate or property will. An estate will explains what you want to happen with your money and property after you die. Is a living will a legal document? A living will is a legal document. Each state has its own laws about living [place].  If you move to another state, make sure that your living will is legal in the state where you now live. Or you might use a universal form that has been approved by many states. This kind of form can sometimes be completed and stored online. Your electronic copy will then be available wherever you have a connection to the Internet. In most cases, doctors will respect your wishes even if you have a form from a different state. · You don't need an  to complete a living will. But legal advice can be helpful if your state's laws are unclear, your health history is complicated, or your family can't agree on what should be in your living will. · You can change your living will at any time. Some people find that their wishes about end-of-life care change as their health changes. · In addition to making a living will, think about completing a medical power of  form. This form lets you name the person you want to make end-of-life treatment decisions for you (your \"health care agent\") if you're not able to. Many hospitals and nursing homes will give you the forms you need to complete a living will and a medical power of . · Your living will is used only if you can't make or communicate decisions for yourself anymore. If you become able to make decisions again, you can accept or refuse any treatment, no matter what you wrote in your living will. · Your state may offer an online registry. This is a place where you can store your living will online so the doctors and nurses who need to treat you can find it right away. What should you think about when creating a living will? Talk about your end-of-life wishes with your family members and your doctor. Let them know what you want. That way the people making decisions for you won't be surprised by your choices. Think about these questions as you make your living will: · Do you know enough about life support methods that might be used?  If not, talk to your doctor so you know what might be done if you can't breathe on your own, your heart stops, or you're unable to swallow. · What things would you still want to be able to do after you receive life-support methods? Would you want to be able to walk? To speak? To eat on your own? To live without the help of machines? · If you have a choice, where do you want to be cared for? In your home? At a hospital or nursing home? · Do you want certain Congregation practices performed if you become very ill? · If you have a choice at the end of your life, where would you prefer to die? At home? In a hospital or nursing home? Somewhere else? · Would you prefer to be buried or cremated? · Do you want your organs to be donated after you die? What should you do with your living will? · Make sure that your family members and your health care agent have copies of your living will. · Give your doctor a copy of your living will to keep in your medical record. If you have more than one doctor, make sure that each one has a copy. · You may want to put a copy of your living will where it can be easily found. Where can you learn more? Go to http://www.gray.com/. Enter D140 in the search box to learn more about \"Learning About Living [place]. \" Current as of: August 8, 2016 Content Version: 11.3 © 7493-1599 [employer]. Care instructions adapted under license by [employer] (which disclaims liability or warranty for this information). If you have questions about a medical condition or this instruction, always ask your healthcare professional. [address] any warranty or liability for your use of this information. Preventing Falls: Care Instructions Your Care Instructions Getting around your home safely can be a challenge if you have injuries or health problems that make it easy for you to fall.  Loose rugs and furniture in walkways are among the dangers for many older people who have problems walking or who have poor eyesight. People who have conditions such as arthritis, osteoporosis, or dementia also have to be careful not to fall. You can make your home safer with a few simple measures. Follow-up care is a key part of your treatment and safety. Be sure to make and go to all appointments, and call your doctor if you are having problems. It's also a good idea to know your test results and keep a list of the medicines you take. How can you care for yourself at home? Taking care of yourself · You may get dizzy if you do not drink enough water. To prevent dehydration, drink plenty of fluids, enough so that your urine is light yellow or clear like water. Choose water and other caffeine-free clear liquids. If you have kidney, heart, or liver disease and have to limit fluids, talk with your doctor before you increase the amount of fluids you drink. · Exercise regularly to improve your strength, muscle tone, and balance. Walk if you can. Swimming may be a good choice if you cannot walk easily. · Have your vision and hearing checked each year or any time you notice a change. If you have trouble seeing and hearing, you might not be able to avoid objects and could lose your balance. · Know the side effects of the medicines you take. Ask your doctor or pharmacist whether the medicines you take can affect your balance. Sleeping pills or sedatives can affect your balance. · Limit the amount of alcohol you drink. Alcohol can impair your balance and other senses. · Ask your doctor whether calluses or corns on your feet need to be removed. If you wear loose-fitting shoes because of calluses or corns, you can lose your balance and fall. · Talk to your doctor if you have numbness in your feet. Preventing falls at home · Remove raised doorway thresholds, throw rugs, and clutter. Repair loose carpet or raised areas in the floor.  
· Move furniture and electrical cords to keep them out of walking paths. · Use nonskid floor wax, and wipe up spills right away, especially on ceramic tile floors. · If you use a walker or cane, put rubber tips on it. If you use crutches, clean the bottoms of them regularly with an abrasive pad, such as steel wool. · Keep your house well lit, especially [place]rystal Martinez, and outside walkways. Use night-lights in areas such as hallways and bathrooms. Add extra light switches or use remote switches (such as switches that go on or off when you clap your hands) to make it easier to turn lights on if you have to get up during the night. · Install sturdy handrails on stairways. · Move items in your cabinets so that the things you use a lot are on the lower shelves (about waist level). · Keep a cordless phone and a flashlight with new batteries by your bed. If possible, put a phone in each of the main rooms of your house, or carry a cell phone in case you fall and cannot reach a phone. Or, you can wear a device around your neck or wrist. You push a button that sends a signal for help. · Wear low-heeled shoes that fit well and give your feet good support. Use footwear with nonskid soles. Check the heels and soles of your shoes for wear. Repair or replace worn heels or soles. · Do not wear socks without shoes on wood floors. · Walk on the grass when the sidewalks are slippery. If you live in an area that gets snow and ice in the winter, sprinkle salt on slippery steps and sidewalks. Preventing falls in the bath · Install grab bars and nonskid mats inside and outside your shower or tub and near the toilet and sinks. · Use shower chairs and bath benches. · Use a hand-held shower head that will allow you to sit while showering.  
· Get into a tub or shower by putting the weaker leg in first. Get out of a tub or shower with your strong side first. 
· Repair loose toilet seats and consider installing a raised toilet seat to make getting on and off the toilet easier. · Keep your bathroom door unlocked while you are in the shower. Where can you learn more? Go to http://www.[employer].com/. Enter 0476 [id number] in the search box to learn more about \"Preventing Falls: Care Instructions. \" Current as of: March 16, 2018 Content Version: 11.8 © 0158-0661 [employer]. Care instructions adapted under license by [employer] (which disclaims liability or warranty for this information). If you have questions about a medical condition or this instruction, always ask your healthcare professional. [place] [address] any warranty or liability for your use of this information.

## 2021-03-17 NOTE — PROGRESS NOTES
HISTORY OF PRESENT ILLNESS  Jenn Fay is a 79 y.o. male. seen for 4 months follow up on several medical issues along with wellness exam  He is still smoking  Follows urology and hematology  Follows cardiology for aortic stenosis  Follows lung specialist for his emphysema. Doing well after his hip replacement . Pain has improved significantly. HPI  Endocrine Review  He is seen for diabetes.  Since last visit he reports: no polyuria or polydipsia, no chest pain, dyspnea or TIA's, no unusual visual symptoms, no hypoglycemia, no medication side effects noted, has noticed numbness both feet.  Home glucose monitoring: is performed regularly, fasting values range 140-200  He is checking his sugars 2 per day.  He reports medication compliance: compliant all of the time.  Medication side effects: none.  Diabetic diet compliance: compliant all of the time.  Lab review: labs reviewed and discussed with patient.  Eye exam: overdue.  Referral done  His sliding scale Insulin was changed to NPH 10 units bid on last visit  Follows KAROLINE for his eye check up. Is advised to get cataract surgery, but he doesn't want to     Lab Results   Component Value Date/Time    Hemoglobin A1c 5.4 11/19/2020 12:11 PM    Hemoglobin A1c (POC) 5.4 06/28/2017 11:27 AM         COPD  Patient complains of cough, fatigue and shortness of breath. Symptoms began several years ago. Symptoms chronic dyspnea: severity = mild: course of sx: well controlled  becomes dyspneic after 5 blocks  symptoms worse with exertion. does worsen with exertion. Sputum is clear in small amounts.  Patient currently is not on home oxygen therapy. Cassi Munoz Respiratory history: pneumonia, COPD and history of 20  pack years tobacco use  Follows Dr Rainer Jolly. PFT not c/w obstructive disease. He was advised to continue on Dulera and prn Albuterol. in spite of counseling, he is still smoking.  He has not followed up with Pulmonology for a long period   As per him he takes Dulera that I prescribed before and that Only taking as needed.     H/o ITP. As per Dr Silvia Mota, have discussed platelet transfusion in past,in denial     Lab Results   Component Value Date/Time    WBC 5.8 11/19/2020 12:11 PM    HGB 10.9 (L) 11/19/2020 12:11 PM    HCT 33.8 (L) 11/19/2020 12:11 PM    PLATELET 56 (L) 01/01/1207 12:11 PM    MCV Cannot be calculated 11/19/2020 12:11 PM       Taking Furosemide along with Potassium supplement for chronic leg swellings    Review of Systems   Constitutional: Negative for chills, fever and malaise/fatigue. HENT: Negative for congestion, ear pain, sore throat and tinnitus. Eyes: Negative for blurred vision, double vision, pain and discharge. Respiratory: Negative for cough, shortness of breath and wheezing. Cardiovascular: Negative for chest pain, palpitations and leg swelling. Gastrointestinal: Negative for abdominal pain, blood in stool, constipation, diarrhea, nausea and vomiting. Genitourinary: Negative for dysuria, frequency, hematuria and urgency. Musculoskeletal: Negative for back pain, joint pain and myalgias. Skin: Negative for rash. Neurological: Negative for dizziness, tremors, seizures and headaches. Balance issues due to feeling of pad on bottom of both feet   Endo/Heme/Allergies: Negative for polydipsia. Does not bruise/bleed easily. Psychiatric/Behavioral: Negative for depression and substance abuse. The patient is not nervous/anxious. Physical Exam  Vitals signs and nursing note reviewed. Constitutional:       Appearance: He is well-developed. He is not diaphoretic. HENT:      Head: Normocephalic and atraumatic. Right Ear: External ear normal.      Mouth/Throat:      Pharynx: No oropharyngeal exudate. Eyes:      General: No scleral icterus. Conjunctiva/sclera: Conjunctivae normal.      Pupils: Pupils are equal, round, and reactive to light. Neck:      Musculoskeletal: Normal range of motion and neck supple. Thyroid: No thyromegaly. Vascular: No JVD. Cardiovascular:      Rate and Rhythm: Normal rate and regular rhythm. Heart sounds: Murmur present. Crescendo  systolic murmur present with a grade of 3/6. Pulmonary:      Effort: Pulmonary effort is normal.      Breath sounds: Normal breath sounds. No wheezing. Abdominal:      General: Bowel sounds are normal. There is no distension. Palpations: Abdomen is soft. There is no mass. Musculoskeletal: Normal range of motion. General: No tenderness. Lymphadenopathy:      Cervical: No cervical adenopathy. Skin:     General: Skin is warm and dry. Findings: No rash. Neurological:      Mental Status: He is alert and oriented to person, place, and time. Cranial Nerves: No cranial nerve deficit. Deep Tendon Reflexes: Reflexes are normal and symmetric. Reflexes normal.         ASSESSMENT and PLAN  Diagnoses and all orders for this visit:    1. Medicare annual wellness visit, subsequent    2. Controlled type 2 diabetes mellitus with diabetic autonomic neuropathy, with long-term current use of insulin (HCC)  -     MICROALBUMIN, UR, RAND W/ MICROALB/CREAT RATIO; Future  -     HEMOGLOBIN A1C WITH EAG; Future  -     METABOLIC PANEL, COMPREHENSIVE; Future    3. Type 2 diabetes with nephropathy [facility])  Assessment & Plan:  Well Controlled, based on history, physical exam and review of pertinent labs, studies and medications; meds reconciled; continue current treatment plan, lifestyle modifications recommended, medication compliance emphasized. Orders:  -     insulin NPH/insulin regular (NOVOLIN 70/30, HUMULIN 70/30) 100 unit/mL (70-30) injection; 10 Units by SubCUTAneous route Before breakfast and dinner.  -     MICROALBUMIN, UR, RAND W/ MICROALB/CREAT RATIO; Future  -     HEMOGLOBIN A1C WITH EAG; Future  -     METABOLIC PANEL, COMPREHENSIVE; Future    4.  Thrombocytopenia ([facility] [address]  Assessment & Plan:  Stable, based on history, physical exam and review of pertinent labs, studies and medications; meds reconciled; continue current treatment plan., This condition is managed by Specialist.    Orders:  -     CBC WITH AUTOMATED DIFF; Future    5. Edema, unspecified type  -     potassium chloride (K-DUR, KLOR-CON) 20 mEq tablet; Take 1 Tab by mouth daily. -     furosemide (LASIX) 40 mg tablet; Take 1 Tab by mouth daily. 6. Nonrheumatic aortic valve stenosis  -     furosemide (LASIX) 40 mg tablet; Take 1 Tab by mouth daily.  -     LIPID PANEL; Future    7. Pulmonary emphysema, unspecified emphysema type ([facility] [address]  Assessment & Plan:  Stable, based on history, physical exam and review of pertinent labs, studies and medications; meds reconciled; continue current treatment plan, lifestyle modifications recommended, medication compliance emphasized. , This condition is managed by Specialist. counseled on smoking cessation    Orders:  -     CBC WITH AUTOMATED DIFF; Future    8. Advanced directives, counseling/discussion  -     ADVANCE CARE PLANNING FIRST 30 MINS    9. Screening for depression  -     DEPRESSION SCREEN ANNUAL      Discussed lifestyle issues and health guidance given  Patient was given an after visit summary which includes diagnoses, vital signs, current medications, instructions and references & authorized prescriptions . Results of labs will be conveyed to patient, once available. Pt verbalized instructions I provided and expressed understanding of discussion that was held today. Follow-up and Dispositions    · Return in about 4 months (around 7/17/2021) for follow up, fasting.

## 2021-03-17 NOTE — PROGRESS NOTES
This is the Subsequent Medicare Annual Wellness Exam, performed 12 months or more after the Initial AWV or the last Subsequent AWV    I have reviewed the patient's medical history in detail and updated the computerized patient record. We did a Medicare Wellness evaluation including a review of past, family, and social histories with attention to age/sex appropriate screening, risk assessment, preventive care and counseling. Any cognitive, fall risk, or ADL issues identified are addressed separately. A patient specific preventive care plan was provided and appropriate screening tests were ordered as specified. Depression Risk Factor Screening:     3 most recent PHQ Screens 3/17/2021   PHQ Not Done -   Little interest or pleasure in doing things Not at all   Feeling down, depressed, irritable, or hopeless Not at all   Total Score PHQ 2 0       Alcohol Risk Screen    Do you average more than 1 drink per night or more than 7 drinks a week: No    In the past three months have you have had more than 4 drinks containing alcohol on one occasion: No        Functional Ability and Level of Safety:    Hearing: The patient needs further evaluation. Activities of Daily Living: The home contains: handrails and grab bars  Patient does total self care      Ambulation: with difficulty, uses a canewhen dark out side or walking for a long distance     Fall Risk:  Fall Risk Assessment, last 12 mths 3/17/2021   Able to walk? Yes   Fall in past 12 months? 0   Do you feel unsteady? 1   Are you worried about falling 0   Is TUG test greater than 12 seconds? 0   Is the gait abnormal? 0   Number of falls in past 12 months -   Fall with injury?  -      Abuse Screen:  Patient is not abused       Cognitive Screening    Has your family/caregiver stated any concerns about your memory: no     Cognitive Screening: Normal - MMSE (Mini Mental Status Exam)  Alert and oriented x5 to place, person and time  Assessment/Plan   Education and counseling provided:  Are appropriate based on today's review and evaluation    Diagnoses and all orders for this visit:    1. Medicare annual wellness visit, subsequent    2. Controlled type 2 diabetes mellitus with diabetic autonomic neuropathy, with long-term current use of insulin (HCC)  -     MICROALBUMIN, UR, RAND W/ MICROALB/CREAT RATIO; Future  -     HEMOGLOBIN A1C WITH EAG; Future  -     METABOLIC PANEL, COMPREHENSIVE; Future    3. Type 2 diabetes with nephropathy [facility])  Assessment & Plan:  Well Controlled, based on history, physical exam and review of pertinent labs, studies and medications; meds reconciled; continue current treatment plan, lifestyle modifications recommended, medication compliance emphasized. Orders:  -     insulin NPH/insulin regular (NOVOLIN 70/30, HUMULIN 70/30) 100 unit/mL (70-30) injection; 10 Units by SubCUTAneous route Before breakfast and dinner.  -     MICROALBUMIN, UR, RAND W/ MICROALB/CREAT RATIO; Future  -     HEMOGLOBIN A1C WITH EAG; Future  -     METABOLIC PANEL, COMPREHENSIVE; Future    4. Thrombocytopenia ([facility] [address]  Assessment & Plan:  Stable, based on history, physical exam and review of pertinent labs, studies and medications; meds reconciled; continue current treatment plan., This condition is managed by Specialist.    Orders:  -     CBC WITH AUTOMATED DIFF; Future    5. Edema, unspecified type  -     potassium chloride (K-DUR, KLOR-CON) 20 mEq tablet; Take 1 Tab by mouth daily. -     furosemide (LASIX) 40 mg tablet; Take 1 Tab by mouth daily. 6. Nonrheumatic aortic valve stenosis  -     furosemide (LASIX) 40 mg tablet; Take 1 Tab by mouth daily.  -     LIPID PANEL; Future    7. Pulmonary emphysema, unspecified emphysema type ([facility] [address]  Assessment & Plan:  Stable, based on history, physical exam and review of pertinent labs, studies and medications; meds reconciled; continue current treatment plan, lifestyle modifications recommended, medication compliance emphasized. , This condition is managed by Specialist. counseled on smoking cessation    Orders:  -     CBC WITH AUTOMATED DIFF; Future    8. Advanced directives, counseling/discussion  -     ADVANCE CARE PLANNING FIRST 30 MINS    9. Screening for depression  -     DEPRESSION SCREEN ANNUAL    Discussed lifestyle issues and health guidance given  Patient was given an after visit summary which includes diagnoses, vital signs, current medications, instructions and references & authorized prescriptions . Results of labs will be conveyed to patient, once available. Pt verbalized instructions I provided and expressed understanding of discussion that was held today. Follow-up and Dispositions    · Return in about 4 months (around 7/17/2021) for follow up, fasting.            Health Maintenance Due     Health Maintenance Due   Topic Date Due    Hepatitis C Screening  Never done    COVID-19 Vaccine (1) Never done    Shingrix Vaccine Age 50> (1 of 2) Never done    MICROALBUMIN Q1  02/13/2021       Patient Care Team   Patient Care Team:  Paul Montemayor MD as PCP - General (Family Medicine)  Paul Montemayor MD as PCP - [facility] Empaneled Provider  Tati Heart MD (Hematology and Oncology)  Sonal Ragland MD (Internal Medicine)  Nguyen Felder MD (Cardiology)  Nathaniel Selby MD (Urology)  Magan Rosen MD (Orthopedic Surgery)    History     Patient Active Problem List   Diagnosis Code    Edema R60.9    Type 2 diabetes mellitus with diabetic neuropathy, with long-term current use of insulin ([address] E11.40, Z79.4    Pulmonary emphysema ([address] J43.9    Benign prostatic hyperplasia N40.0    Thrombocytopenia ([address] D69.6    Tobacco abuse Z72.0    Nonrheumatic aortic valve stenosis I35.0    Arthritis of right hip M16.11    History of bladder cancer Z85.51    Benign essential HTN I10    Prostate nodule N40.2    Over weight E66.3    Type 2 diabetes with nephropathy ([address] E11.21    Controlled type 2 diabetes mellitus with diabetic autonomic neuropathy, with long-term current use of insulin ([facility]) E11.43, Z79.4     Past Medical History:   Diagnosis Date    COPD (chronic obstructive pulmonary disease) ([facility] [address]     Diabetes ([facility] [address]     Enlarged prostate     Moderate aortic stenosis     echo June 2016 Diony     Pneumonia       Past Surgical History:   Procedure Laterality Date    HX GI      cholecystectomy    HX RETINAL DETACHMENT REPAIR      HX UROLOGICAL      bladder surgery    NE ABDOMEN SURGERY PROC UNLISTED      hernia repair     Current Outpatient Medications   Medication Sig Dispense Refill    insulin NPH/insulin regular (NOVOLIN 70/30, HUMULIN 70/30) 100 unit/mL (70-30) injection 10 Units by SubCUTAneous route Before breakfast and dinner. 10 mL 1    potassium chloride (K-DUR, KLOR-CON) 20 mEq tablet Take 1 Tab by mouth daily. 90 Tab 3    furosemide (LASIX) 40 mg tablet Take 1 Tab by mouth daily. 90 Tab 1    Insulin Syringe-Needle U-100 (BD Insulin Syringe Ultra-Fine) 0.3 mL 31 gauge x 5/16\" syrg Use twice daily to inject insulin. 200 Syringe 1    gabapentin (NEURONTIN) 300 mg capsule TAKE 1 CAPSULE BY MOUTH ONCE NIGHTLY 30 Cap 0    tamsulosin (FLOMAX) 0.4 mg capsule Take 1 Cap by mouth daily. 90 Cap 0    metFORMIN ER (GLUCOPHAGE XR) 500 mg tablet Take 1 Tab by mouth daily (with dinner). 90 Tab 1    glucose blood VI test strips ([place] BLOOD GLUCOSE TEST STRIP) strip USE TO TEST TWICE DAILY      multivit with minerals/lutein (MULTIVITAMIN 50 PLUS PO) Take  by mouth.  mometasone-formoterol (DULERA) 100-5 mcg/actuation HFA inhaler INL 2 PFS PO BID      ONETOUCH ULTRA TEST strip USE TO TEST TWICE DAILY 100 Strip 0    albuterol (PROAIR HFA) 90 mcg/actuation inhaler Take 1 Puff by inhalation.        No Known Allergies    Family History   Problem Relation Age of Onset    Diabetes Mother     Diabetes Father         had angina    Diabetes Paternal Grandmother      Social History     Tobacco Use    Smoking status: Current Every Day Smoker     Packs/day: 0.50    Smokeless tobacco: Never Used    Tobacco comment: 1 pack a day  ( 16-18 ) a day    Substance Use Topics    Alcohol use: Yes     Alcohol/week: 14.0 standard drinks     Types: 14 Cans of beer per week     Comment: Social    Discussed lifestyle issues and health guidance given  Patient was given an after visit summary which includes diagnoses, vital signs, current medications, instructions and references & authorized prescriptions . Results of labs will be conveyed to patient, once available. Pt verbalized instructions I provided and expressed understanding of discussion that was held today. Follow-up and Dispositions    · Return in about 4 months (around 7/17/2021) for follow up, fasting.

## 2021-03-18 ENCOUNTER — TELEPHONE (OUTPATIENT)
Dept: FAMILY MEDICINE CLINIC | Age: 81
End: 2021-03-18

## 2021-03-18 LAB
ERYTHROCYTE [DISTWIDTH] IN BLOOD BY AUTOMATED COUNT: 18.8 % (ref 11.5–14.5)
HCT VFR BLD AUTO: 35.1 % (ref 36.6–50.3)
HGB BLD-MCNC: 12 G/DL (ref 12.1–17)
MCH RBC QN AUTO: 34.1 PG (ref 26–34)
MCHC RBC AUTO-ENTMCNC: 34.2 G/DL (ref 30–36.5)
MCV RBC AUTO: 99.7 FL (ref 80–99)
NRBC # BLD: 0 K/UL (ref 0–0.01)
NRBC BLD-RTO: 0 PER 100 WBC
PLATELET # BLD AUTO: 45 K/UL (ref 150–400)
PMV BLD AUTO: 11.8 FL (ref 8.9–12.9)
RBC # BLD AUTO: 3.52 M/UL (ref 4.1–5.7)
WBC # BLD AUTO: 4.9 K/UL (ref 4.1–11.1)

## 2021-03-18 NOTE — TELEPHONE ENCOUNTER
He has h/o neck thrombocytopenia and he follows Dr. Rebecca Martinez from [address] [place] [address]. He has an appointment next week and Dr. Rebecca Martinez is closely following his labs. Patient is in denial for platelet transfusion or steroid.

## 2021-03-18 NOTE — TELEPHONE ENCOUNTER
Markie call from [facility] and was informed by her that patient Mariano Anderson has a critical value .  Platelet is 45

## 2021-03-18 NOTE — TELEPHONE ENCOUNTER
Arik Esquivel called from [facility] lab with critical lab results.     [facility] [phone]

## 2021-03-19 NOTE — PROGRESS NOTES
Please inform patient and fax results to Dr Lesli Smallwood (Phone: [phone]; Fax: [phone])  CBC shows low platelets ( he has significant thrombocytopenia) but his hgb and RBC count has improved from prior. Kidney,liver,cholesterol and A1C ( diabetes marker ) are very normal,tell him A1C is 4.9 and he can reduce his Insulin to 6-8 unis before meals. Urine protein is worsening ,likely from his chronic medical conditions and cancer.   thanks

## 2021-03-19 NOTE — PROGRESS NOTES
Called patient. Two patient identifiers verified. Discussed lab results. Informed that platelets count is low and Hgb and RBC have improved. Advisee to take 6 -8 units of insulin before meals as A1c is 4.9. He  Verbalized understanding. Letter placed in the mail.

## 2021-04-26 DIAGNOSIS — Z79.4 TYPE 2 DIABETES MELLITUS WITH DIABETIC NEUROPATHY, WITH LONG-TERM CURRENT USE OF INSULIN (HCC): ICD-10-CM

## 2021-04-26 DIAGNOSIS — E11.40 TYPE 2 DIABETES MELLITUS WITH DIABETIC NEUROPATHY, WITH LONG-TERM CURRENT USE OF INSULIN (HCC): ICD-10-CM

## 2021-04-26 RX ORDER — GABAPENTIN 300 MG/1
300 CAPSULE ORAL
Qty: 30 CAP | Refills: 0 | Status: SHIPPED | OUTPATIENT
Start: 2021-04-26 | End: 2021-06-01

## 2021-04-26 NOTE — TELEPHONE ENCOUNTER
MD Autumn Roche,    Request for refill of gabapentin. Check . Thanks, Myra    Last Visit: 3/17/21 MD Autumn Roche  Next Appointment: 7/19/21 MD Courtney Frank  Previous Refill Encounter(s): 3/14/21 30  Filled: 3/16/21 per pharmacy fax    Requested Prescriptions     Pending Prescriptions Disp Refills    gabapentin (NEURONTIN) 300 mg capsule 30 Cap 0     Sig: Take 1 Cap by mouth nightly. Max Daily Amount: 300 mg.

## 2021-06-01 DIAGNOSIS — Z79.4 TYPE 2 DIABETES MELLITUS WITH DIABETIC NEUROPATHY, WITH LONG-TERM CURRENT USE OF INSULIN (HCC): ICD-10-CM

## 2021-06-01 DIAGNOSIS — E11.40 TYPE 2 DIABETES MELLITUS WITH DIABETIC NEUROPATHY, WITH LONG-TERM CURRENT USE OF INSULIN (HCC): ICD-10-CM

## 2021-06-01 RX ORDER — GABAPENTIN 300 MG/1
CAPSULE ORAL
Qty: 30 CAPSULE | Refills: 0 | Status: SHIPPED | OUTPATIENT
Start: 2021-06-01 | End: 2021-07-18

## 2021-06-07 ENCOUNTER — OFFICE VISIT (OUTPATIENT)
Dept: CARDIOLOGY CLINIC | Age: 81
End: 2021-06-07

## 2021-06-07 ENCOUNTER — ANCILLARY PROCEDURE (OUTPATIENT)
Dept: CARDIOLOGY CLINIC | Age: 81
End: 2021-06-07
Payer: MEDICARE

## 2021-06-07 VITALS
DIASTOLIC BLOOD PRESSURE: 60 MMHG | SYSTOLIC BLOOD PRESSURE: 120 MMHG | HEIGHT: 71 IN | HEART RATE: 62 BPM | OXYGEN SATURATION: 98 % | RESPIRATION RATE: 16 BRPM | BODY MASS INDEX: 27.3 KG/M2 | WEIGHT: 195 LBS

## 2021-06-07 VITALS — WEIGHT: 195 LBS | HEIGHT: 71 IN | BODY MASS INDEX: 27.3 KG/M2

## 2021-06-07 DIAGNOSIS — I35.0 MODERATE AORTIC STENOSIS: Primary | ICD-10-CM

## 2021-06-07 DIAGNOSIS — E11.21 TYPE 2 DIABETES WITH NEPHROPATHY (HCC): ICD-10-CM

## 2021-06-07 DIAGNOSIS — Z72.0 TOBACCO USE: ICD-10-CM

## 2021-06-07 DIAGNOSIS — I35.0 AORTIC VALVE STENOSIS, ETIOLOGY OF CARDIAC VALVE DISEASE UNSPECIFIED: ICD-10-CM

## 2021-06-07 DIAGNOSIS — J44.9 CHRONIC OBSTRUCTIVE PULMONARY DISEASE, UNSPECIFIED COPD TYPE (HCC): ICD-10-CM

## 2021-06-07 DIAGNOSIS — Z71.6 ENCOUNTER FOR COUNSELING FOR TOBACCO USE DISORDER: ICD-10-CM

## 2021-06-07 DIAGNOSIS — I10 BENIGN ESSENTIAL HTN: ICD-10-CM

## 2021-06-07 LAB
ECHO AV AREA PEAK VELOCITY: 0.86 CM2
ECHO AV AREA VTI: 0.99 CM2
ECHO AV AREA/BSA PEAK VELOCITY: 0.4 CM2/M2
ECHO AV AREA/BSA VTI: 0.5 CM2/M2
ECHO AV MEAN GRADIENT: 40.87 MMHG
ECHO AV PEAK GRADIENT: 59.12 MMHG
ECHO AV PEAK VELOCITY: 384.46 CM/S
ECHO AV VTI: 81.97 CM
ECHO IVC PROX: 1.81 CM
ECHO LA AREA 4C: 24.63 CM2
ECHO LA VOL 2C: 104.06 ML (ref 18–58)
ECHO LA VOL 4C: 86.49 ML (ref 18–58)
ECHO LA VOL BP: 99.03 ML (ref 18–58)
ECHO LA VOL/BSA BIPLANE: 47.38 ML/M2 (ref 16–28)
ECHO LA VOLUME INDEX A2C: 49.79 ML/M2 (ref 16–28)
ECHO LA VOLUME INDEX A4C: 41.38 ML/M2 (ref 16–28)
ECHO LV E' LATERAL VELOCITY: 8.2 CM/S
ECHO LV E' SEPTAL VELOCITY: 6.68 CM/S
ECHO LV EDV A2C: 78.57 ML
ECHO LV EDV A4C: 95.18 ML
ECHO LV EDV BP: 88.89 ML (ref 67–155)
ECHO LV EDV INDEX A4C: 45.5 ML/M2
ECHO LV EDV INDEX BP: 42.5 ML/M2
ECHO LV EDV NDEX A2C: 37.6 ML/M2
ECHO LV EJECTION FRACTION A2C: 59 PERCENT
ECHO LV EJECTION FRACTION A4C: 55 PERCENT
ECHO LV EJECTION FRACTION BIPLANE: 55.3 PERCENT (ref 55–100)
ECHO LV ESV A2C: 32.28 ML
ECHO LV ESV A4C: 42.36 ML
ECHO LV ESV BP: 39.75 ML (ref 22–58)
ECHO LV ESV INDEX A2C: 15.4 ML/M2
ECHO LV ESV INDEX A4C: 20.3 ML/M2
ECHO LV ESV INDEX BP: 19 ML/M2
ECHO LV INTERNAL DIMENSION DIASTOLIC: 4.16 CM (ref 4.2–5.9)
ECHO LV INTERNAL DIMENSION SYSTOLIC: 2.97 CM
ECHO LV IVSD: 1.29 CM (ref 0.6–1)
ECHO LV MASS 2D: 214 G (ref 88–224)
ECHO LV MASS INDEX 2D: 102.4 G/M2 (ref 49–115)
ECHO LV POSTERIOR WALL DIASTOLIC: 1.45 CM (ref 0.6–1)
ECHO LVOT DIAM: 2.01 CM
ECHO LVOT PEAK GRADIENT: 4.3 MMHG
ECHO LVOT PEAK VELOCITY: 103.7 CM/S
ECHO LVOT SV: 80.8 ML
ECHO LVOT VTI: 25.39 CM
ECHO MV A VELOCITY: 117.83 CM/S
ECHO MV E DECELERATION TIME (DT): 426.97 MS
ECHO MV E VELOCITY: 87.61 CM/S
ECHO MV E/A RATIO: 0.74
ECHO MV E/E' LATERAL: 10.68
ECHO MV E/E' RATIO (AVERAGED): 11.9
ECHO MV E/E' SEPTAL: 13.12
ECHO PV MAX VELOCITY: 91.6 CM/S
ECHO PV PEAK INSTANTANEOUS GRADIENT SYSTOLIC: 3.36 MMHG
ECHO RA MINOR AXIS: 3.65 CM
ECHO RV INTERNAL DIMENSION: 3.68 CM
ECHO RV TAPSE: 1.88 CM (ref 1.5–2)
LA VOL DISK BP: 94.7 ML (ref 18–58)

## 2021-06-07 PROCEDURE — G8754 DIAS BP LESS 90: HCPCS | Performed by: INTERNAL MEDICINE

## 2021-06-07 PROCEDURE — G8752 SYS BP LESS 140: HCPCS | Performed by: INTERNAL MEDICINE

## 2021-06-07 PROCEDURE — 1101F PT FALLS ASSESS-DOCD LE1/YR: CPT | Performed by: INTERNAL MEDICINE

## 2021-06-07 PROCEDURE — 93306 TTE W/DOPPLER COMPLETE: CPT | Performed by: INTERNAL MEDICINE

## 2021-06-07 PROCEDURE — G8510 SCR DEP NEG, NO PLAN REQD: HCPCS | Performed by: INTERNAL MEDICINE

## 2021-06-07 PROCEDURE — G8427 DOCREV CUR MEDS BY ELIG CLIN: HCPCS | Performed by: INTERNAL MEDICINE

## 2021-06-07 PROCEDURE — 99214 OFFICE O/P EST MOD 30 MIN: CPT | Performed by: INTERNAL MEDICINE

## 2021-06-07 PROCEDURE — G8419 CALC BMI OUT NRM PARAM NOF/U: HCPCS | Performed by: INTERNAL MEDICINE

## 2021-06-07 PROCEDURE — G8536 NO DOC ELDER MAL SCRN: HCPCS | Performed by: INTERNAL MEDICINE

## 2021-06-07 NOTE — PROGRESS NOTES
[facility] [place]: [place]  [phone]    History of Present Illness:   Mr. Jonathan Tao is 78 yo M with mild to moderate aortic stenosis, RBBB, COPD, HTN. Echo 2018 with normal LV function with moderate aortic stenosis, mean gradient of 24 mmHg. This is slightly increased from 17 mmHg in 2016. H/o bladder cancer and follows Dr Amaya Jack s/p TURP. Followed by hematology for chronic thrombocytopenia. Since his last visit, overall he has been feeling okay. He is going to have a bladder surgery for polyp with Dr. Amaya Jack at [facility] on 07/02/2021. He is also followed by Dr. Nina Foley for low platelets, immune response. He does plan on trying to stop smoking on his birthday coming up this month. From a symptom standpoint, he denies any chest pain. His breathing has been stable. No significant palpitations, lightheadedness or dizziness. He is compensated on exam with clear lungs, III/VI systolic murmur at the left sternal border. His echocardiogram today was overall unchanged with continued preserved LV function and he does have moderate stenosis and his gradient is increased from 31 to 32 mmHg. Soc hx. ; his wife is followed by Dr. Charlene Gamboa and Plan:   1. Moderate aortic stenosis. Asymptomatic. His gradient has stabilized in the 31-32 mmHg range. Follow back in six months. Consider repeat echocardiogram in one year. We did talk about possibly a TAVR in the future, but is not indicated at this time. 2. Cardiac evaluation. He is stable cardiac wise and can proceed with surgeries or procedures and will send a clearance letter to Dr. Amaya Jack.   3. Essential hypertension. Blood pressure is controlled. 4. Tobacco use. Plans for cessation and encouraged this. 5. Low platelet count. Followed by Dr. Nina Foley. He  has a past medical history of COPD (chronic obstructive pulmonary disease) ([address] Diabetes ([address] Enlarged prostate, Moderate aortic stenosis, and Pneumonia.     All other systems negative except as above. PE  Vitals:    06/07/21 1049   BP: 120/60   Pulse: 62   Resp: 16   SpO2: 98%   Weight: 195 lb (88.5 kg)   Height: 5' 11\" (1.803 m)    Body mass index is 27.2 kg/m².    General appearance - alert, well appearing, and in no distress  Mental status - affect appropriate to mood  Eyes - sclera anicteric, moist mucous membranes  Neck - supple, no JVD  Chest - clear to auscultation, no wheezes, rales or rhonchi  Heart - normal rate, regular rhythm, normal S1, S2, II-III/IV systolic murmur LUSB  Abdomen - soft, nontender, nondistended, no masses or organomegaly  Extremities - peripheral pulses normal, no pedal edema    Recent Labs:  Lab Results   Component Value Date/Time    Cholesterol, total 163 03/17/2021 12:31 PM    HDL Cholesterol 37 03/17/2021 12:31 PM    LDL, calculated 99.8 03/17/2021 12:31 PM    Triglyceride 131 03/17/2021 12:31 PM    CHOL/HDL Ratio 4.4 03/17/2021 12:31 PM     Lab Results   Component Value Date/Time    Creatinine 1.05 03/17/2021 12:31 PM     Lab Results   Component Value Date/Time    BUN 20 03/17/2021 12:31 PM     Lab Results   Component Value Date/Time    Potassium 5.1 03/17/2021 12:31 PM     Lab Results   Component Value Date/Time    Hemoglobin A1c 4.9 03/17/2021 12:31 PM     Lab Results   Component Value Date/Time    HGB 12.0 (L) 03/17/2021 12:31 PM     Lab Results   Component Value Date/Time    PLATELET 45 (LL) 07/66/7319 12:31 PM       Reviewed:  Past Medical History:   Diagnosis Date    [facility]) [facility] [address]     Diabetes [facility] [address]     Enlarged prostate     Moderate aortic stenosis     echo June 2016 Damon Arbour     Pneumonia      Social History     Tobacco Use   Smoking Status Current Every Day Smoker    Packs/day: 0.50   Smokeless Tobacco Never Used   Tobacco Comment    1 pack a day  ( 16-18 ) a day      Social History     Substance and Sexual Activity   Alcohol Use Yes    Alcohol/week: 14.0 standard drinks    Types: 14 Cans of beer per week    Comment: Social      No Known Allergies    Current Outpatient Medications   Medication Sig    gabapentin (NEURONTIN) 300 mg capsule TAKE 1 CAPSULE BY MOUTH NIGHTLY    insulin NPH/insulin regular (NOVOLIN 70/30, HUMULIN 70/30) 100 unit/mL (70-30) injection 10 Units by SubCUTAneous route Before breakfast and dinner.  potassium chloride (K-DUR, KLOR-CON) 20 mEq tablet Take 1 Tab by mouth daily.  furosemide (LASIX) 40 mg tablet Take 1 Tab by mouth daily.  Insulin Syringe-Needle U-100 (BD Insulin Syringe Ultra-Fine) 0.3 mL 31 gauge x 5/16\" syrg Use twice daily to inject insulin.  tamsulosin (FLOMAX) 0.4 mg capsule Take 1 Cap by mouth daily.  metFORMIN ER (GLUCOPHAGE XR) 500 mg tablet Take 1 Tab by mouth daily (with dinner).  glucose blood VI test strips ([place] BLOOD GLUCOSE TEST STRIP) strip USE TO TEST TWICE DAILY    multivit with minerals/lutein (MULTIVITAMIN 50 PLUS PO) Take  by mouth.  albuterol (PROAIR HFA) 90 mcg/actuation inhaler Take 1 Puff by inhalation.  mometasone-formoterol (DULERA) 100-5 mcg/actuation HFA inhaler INL 2 PFS PO BID    ONETOUCH ULTRA TEST strip USE TO TEST TWICE DAILY     No current facility-administered medications for this visit.        Rivka Edgar MD  [facility] [place]  [address] [facility] [address]  [address]

## 2021-06-07 NOTE — LETTER
Patient:  Fernando Hart YOB: 1940 Date of Visit: 6/7/2021 Dear MD [address] [id number] [address] [employer] Houston Moses MD 
[address] [address] [address] [employer] Osiel Ramos MD 
[address] 
[address] [address] Via Fax: [phone]: Mr. Gab Bloom is 78 yo M with mild to moderate aortic stenosis, RBBB, COPD, HTN. Echo 2018 with normal LV function with moderate aortic stenosis, mean gradient of 24 mmHg. This is slightly increased from 17 mmHg in 2016. H/o bladder cancer and follows Dr Xander Chavez s/p MAURICE. Followed by hematology for chronic thrombocytopenia. Since his last visit, overall he has been feeling okay. He is going to have a bladder surgery for polyp with Dr. Xander Chavez at [facility] on 07/02/2021. He is also followed by Dr. Irlanda Calix for low platelets, immune response. He does plan on trying to stop smoking on his birthday coming up this month. From a symptom standpoint, he denies any chest pain. His breathing has been stable. No significant palpitations, lightheadedness or dizziness. He is compensated on exam with clear lungs, III/VI systolic murmur at the left sternal border. His echocardiogram today was overall unchanged with continued preserved LV function and he does have moderate stenosis and his gradient is increased from 31 to 32 mmHg. Soc hx. ; his wife is followed by Dr. Wilde Flow Assessment and Plan: 1. Moderate aortic stenosis. Asymptomatic. His gradient has stabilized in the 31-32 mmHg range. Follow back in six months. Consider repeat echocardiogram in one year. We did talk about possibly a TAVR in the future, but is not indicated at this time. 2. Cardiac evaluation. He is stable cardiac wise and can proceed with surgeries or procedures and will send a clearance letter to Dr. Xander Chavez.  
3. Essential hypertension. Blood pressure is controlled.    
4. Tobacco use.  Plans for cessation and encouraged this. 5. Low platelet count. Followed by Dr. Larry Shoemaker. If you have questions, please do not hesitate to call me. Sincerely, Iram Mejia MD

## 2021-07-16 DIAGNOSIS — Z79.4 TYPE 2 DIABETES MELLITUS WITH DIABETIC NEUROPATHY, WITH LONG-TERM CURRENT USE OF INSULIN (HCC): ICD-10-CM

## 2021-07-16 DIAGNOSIS — E11.40 TYPE 2 DIABETES MELLITUS WITH DIABETIC NEUROPATHY, WITH LONG-TERM CURRENT USE OF INSULIN (HCC): ICD-10-CM

## 2021-07-18 RX ORDER — GABAPENTIN 300 MG/1
CAPSULE ORAL
Qty: 30 CAPSULE | Refills: 0 | Status: SHIPPED | OUTPATIENT
Start: 2021-07-18 | End: 2021-01-01

## 2021-07-19 ENCOUNTER — OFFICE VISIT (OUTPATIENT)
Dept: FAMILY MEDICINE CLINIC | Age: 81
End: 2021-07-19
Payer: MEDICARE

## 2021-07-19 VITALS
WEIGHT: 196 LBS | TEMPERATURE: 98.3 F | BODY MASS INDEX: 27.44 KG/M2 | HEART RATE: 67 BPM | DIASTOLIC BLOOD PRESSURE: 70 MMHG | SYSTOLIC BLOOD PRESSURE: 130 MMHG | HEIGHT: 71 IN | OXYGEN SATURATION: 99 %

## 2021-07-19 DIAGNOSIS — Z79.4 CONTROLLED TYPE 2 DIABETES MELLITUS WITH DIABETIC AUTONOMIC NEUROPATHY, WITH LONG-TERM CURRENT USE OF INSULIN (HCC): Primary | ICD-10-CM

## 2021-07-19 DIAGNOSIS — F17.210 SMOKING GREATER THAN 20 PACK YEARS: ICD-10-CM

## 2021-07-19 DIAGNOSIS — M19.031 PRIMARY OSTEOARTHRITIS OF BOTH WRISTS: ICD-10-CM

## 2021-07-19 DIAGNOSIS — E11.21 TYPE 2 DIABETES WITH NEPHROPATHY (HCC): ICD-10-CM

## 2021-07-19 DIAGNOSIS — M19.032 PRIMARY OSTEOARTHRITIS OF BOTH WRISTS: ICD-10-CM

## 2021-07-19 DIAGNOSIS — I35.0 NONRHEUMATIC AORTIC VALVE STENOSIS: ICD-10-CM

## 2021-07-19 DIAGNOSIS — E11.43 CONTROLLED TYPE 2 DIABETES MELLITUS WITH DIABETIC AUTONOMIC NEUROPATHY, WITH LONG-TERM CURRENT USE OF INSULIN (HCC): Primary | ICD-10-CM

## 2021-07-19 DIAGNOSIS — J43.9 PULMONARY EMPHYSEMA, UNSPECIFIED EMPHYSEMA TYPE (HCC): ICD-10-CM

## 2021-07-19 DIAGNOSIS — I10 BENIGN ESSENTIAL HTN: ICD-10-CM

## 2021-07-19 DIAGNOSIS — M25.50 CHRONIC PAIN OF MULTIPLE JOINTS: ICD-10-CM

## 2021-07-19 DIAGNOSIS — G89.29 CHRONIC PAIN OF MULTIPLE JOINTS: ICD-10-CM

## 2021-07-19 DIAGNOSIS — D69.6 THROMBOCYTOPENIA (HCC): ICD-10-CM

## 2021-07-19 PROCEDURE — 99214 OFFICE O/P EST MOD 30 MIN: CPT | Performed by: FAMILY MEDICINE

## 2021-07-19 PROCEDURE — G8536 NO DOC ELDER MAL SCRN: HCPCS | Performed by: FAMILY MEDICINE

## 2021-07-19 PROCEDURE — G8754 DIAS BP LESS 90: HCPCS | Performed by: FAMILY MEDICINE

## 2021-07-19 PROCEDURE — G8419 CALC BMI OUT NRM PARAM NOF/U: HCPCS | Performed by: FAMILY MEDICINE

## 2021-07-19 PROCEDURE — G8510 SCR DEP NEG, NO PLAN REQD: HCPCS | Performed by: FAMILY MEDICINE

## 2021-07-19 PROCEDURE — G8427 DOCREV CUR MEDS BY ELIG CLIN: HCPCS | Performed by: FAMILY MEDICINE

## 2021-07-19 PROCEDURE — G8752 SYS BP LESS 140: HCPCS | Performed by: FAMILY MEDICINE

## 2021-07-19 PROCEDURE — 1101F PT FALLS ASSESS-DOCD LE1/YR: CPT | Performed by: FAMILY MEDICINE

## 2021-07-19 RX ORDER — PHENAZOPYRIDINE HYDROCHLORIDE 200 MG/1
TABLET, FILM COATED ORAL
COMMUNITY
Start: 2021-07-02 | End: 2021-07-19 | Stop reason: ALTCHOICE

## 2021-07-19 RX ORDER — IBUPROFEN 200 MG
1 TABLET ORAL EVERY 24 HOURS
Qty: 30 PATCH | Refills: 0 | Status: SHIPPED | OUTPATIENT
Start: 2021-07-19 | End: 2021-08-12

## 2021-07-19 NOTE — PATIENT INSTRUCTIONS
Stopping Smoking: Care Instructions  Your Care Instructions     Cigarette smokers crave the nicotine in cigarettes. Giving it up is much harder than simply changing a habit. Your body has to stop craving the nicotine. It is hard to quit, but you can do it. There are many tools that people use to quit smoking. You may find that combining tools works best for you. There are several steps to quitting. First you get ready to quit. Then you get support to help you. After that, you learn new skills and behaviors to become a nonsmoker. For many people, a necessary step is getting and using medicine. Your doctor will help you set up the plan that best meets your needs. You may want to attend a smoking cessation program to help you quit smoking. When you choose a program, look for one that has proven success. Ask your doctor for ideas. You will greatly increase your chances of success if you take medicine as well as get counseling or join a cessation program.  Some of the changes you feel when you first quit tobacco are uncomfortable. Your body will miss the nicotine at first, and you may feel short-tempered and grumpy. You may have trouble sleeping or concentrating. Medicine can help you deal with these symptoms. You may struggle with changing your smoking habits and rituals. The last step is the tricky one: Be prepared for the smoking urge to continue for a time. This is a lot to deal with, but keep at it. You will feel better. Follow-up care is a key part of your treatment and safety. Be sure to make and go to all appointments, and call your doctor if you are having problems. It's also a good idea to know your test results and keep a list of the medicines you take. How can you care for yourself at home? · Ask your family, friends, and coworkers for support. You have a better chance of quitting if you have help and support.   · Join a support group, such as Nicotine Anonymous, for people who are trying to quit smoking. · Consider signing up for a smoking cessation program, such as the American Lung Association's Freedom from Smoking program.  · Get text messaging support. Go to the website at www.smokefree. gov to sign up for the [facility] program.  · Set a quit date. Pick your date carefully so that it is not right in the middle of a big deadline or stressful time. Once you quit, do not even take a puff. Get rid of all ashtrays and lighters after your last cigarette. Clean your house and your clothes so that they do not smell of smoke. · Learn how to be a nonsmoker. Think about ways you can avoid those things that make you reach for a cigarette. ? Avoid situations that put you at greatest risk for smoking. For some people, it is hard to have a drink with friends without smoking. For others, they might skip a coffee break with coworkers who smoke. ? Change your daily routine. Take a different route to work or eat a meal in a different place. · Cut down on stress. Calm yourself or release tension by doing an activity you enjoy, such as reading a book, taking a hot bath, or gardening. · Talk to your doctor or pharmacist about nicotine replacement therapy, which replaces the nicotine in your body. You still get nicotine but you do not use tobacco. Nicotine replacement products help you slowly reduce the amount of nicotine you need. These products come in several forms, many of them available over-the-counter:  ? Nicotine patches  ? Nicotine gum and lozenges  ? Nicotine inhaler  · Ask your doctor about bupropion (Wellbutrin) or varenicline (Chantix), which are prescription medicines. They do not contain nicotine. They help you by reducing withdrawal symptoms, such as stress and anxiety. · Some people find hypnosis, acupuncture, and massage helpful for ending the smoking habit. · Eat a healthy diet and get regular exercise. Having healthy habits will help your body move past its craving for nicotine.   · Be prepared to keep trying. Most people are not successful the first few times they try to quit. Do not get mad at yourself if you smoke again. Make a list of things you learned and think about when you want to try again, such as next week, next month, or next year. Where can you learn more? Go to http://www.gray.com/  Enter [id number] in the search box to learn more about \"Stopping Smoking: Care Instructions. \"  Current as of: March 12, 2020               Content Version: 12.8  © 6196-2146 [employer]. Care instructions adapted under license by [employer] (which disclaims liability or warranty for this information). If you have questions about a medical condition or this instruction, always ask your healthcare professional. [address] any warranty or liability for your use of this information.

## 2021-07-19 NOTE — PROGRESS NOTES
HISTORY OF PRESENT ILLNESS  Brissa Galvez is a [de-identified] y.o. male. Patient was seen today for follow-up on diabetes, dyslipidemia, hypertension, thrombocytopenia, and CKD. Is working hard on quitting smoking cigarettes. Is down to 1 to 2 cigarettes/day now. Is complaining of bilateral wrist and hand pain. Recently he followed up with cardiology for aortic stenosis. HPI  Endocrine Review  He is seen for diabetes.  Since last visit he reports: no polyuria or polydipsia, no chest pain, dyspnea or TIA's, no unusual visual symptoms, no hypoglycemia, no medication side effects noted, has noticed numbness both feet.  Home glucose monitoring: is performed regularly, fasting values range 140-200  He is checking his sugars 2 per day.  He reports medication compliance: compliant all of the time.  Medication side effects: none.  Diabetic diet compliance: compliant all of the time.  Lab review: labs reviewed and discussed with patient.  Eye exam: overdue.  Referral done  His sliding scale Insulin was changed to NPH 6 units bid on last visit  Follows [facility] for his eye check up. Is advised to get cataract surgery, but he doesn't want to     Lab Results   Component Value Date/Time    Hemoglobin A1c 4.9 03/17/2021 12:31 PM    Hemoglobin A1c (POC) 5.4 06/28/2017 11:27 AM        COPD  Patient complains of cough, fatigue and shortness of breath. Symptoms began several years ago. Symptoms chronic dyspnea: severity = mild: course of sx: well controlled  becomes dyspneic after 5 blocks  symptoms worse with exertion. does worsen with exertion. Sputum is clear in small amounts.  Patient currently is not on home oxygen therapy. Anais Suazo Respiratory history: pneumonia, COPD and history of 20  pack years tobacco use  Follows Dr Sloane Ruiz. PFT not c/w obstructive disease. He was advised to continue on Dulera and prn Albuterol. in spite of counseling, he is still smoking.  He has not followed up with Pulmonology for a long period   As per him he takes Dulera that I prescribed before and that Only taking as needed.         H/o ITP. As per Dr Mortimer Hand, have discussed platelet transfusion in past,in denial     Lab Results   Component Value Date/Time    WBC 4.9 03/17/2021 12:31 PM    HGB 12.0 (L) 03/17/2021 12:31 PM    HCT 35.1 (L) 03/17/2021 12:31 PM    PLATELET 45 (LL) 26/52/5779 12:31 PM    MCV 99.7 (H) 03/17/2021 12:31 PM          Taking Furosemide along with Potassium supplement for chronic leg swellings    Wrist Pain  Patient complains of bilateral wrist pain. The pain is moderate, worsens with movement, and some relief by rest.  There is not associated numbness, tingling, weakness in the bilateral hand. Pain has been present for several months. There is a history of overuse. His wife is bedridden and wheelchair-bound. 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 Review of Systems   Constitutional: Negative for chills, fever and malaise/fatigue. HENT: Negative for congestion, ear pain, sore throat and tinnitus. Eyes: Negative for blurred vision, double vision, pain and discharge. Respiratory: Negative for cough, shortness of breath and wheezing. Cardiovascular: Negative for chest pain, palpitations and leg swelling. Gastrointestinal: Negative for abdominal pain, blood in stool, constipation, diarrhea, nausea and vomiting. Genitourinary: Negative for dysuria, frequency, hematuria and urgency. Musculoskeletal: Negative for back pain, joint pain and myalgias. Bilateral wrist and hand pain   Skin: Negative for rash. Neurological: Negative for dizziness, tremors, seizures and headaches. Endo/Heme/Allergies: Negative for polydipsia. Does not bruise/bleed easily. Psychiatric/Behavioral: Negative for depression and substance abuse. The patient is not nervous/anxious. Physical Exam  Vitals and nursing note reviewed. Constitutional:       Appearance: He is well-developed. He is not diaphoretic.    HENT:      Head: Normocephalic and atraumatic. Right Ear: External ear normal.      Mouth/Throat:      Pharynx: No oropharyngeal exudate. Eyes:      General: No scleral icterus. Conjunctiva/sclera: Conjunctivae normal.      Pupils: Pupils are equal, round, and reactive to light. Neck:      Thyroid: No thyromegaly. Vascular: No JVD. Cardiovascular:      Rate and Rhythm: Normal rate and regular rhythm. Heart sounds: Normal heart sounds. No murmur heard. Pulmonary:      Effort: Pulmonary effort is normal.      Breath sounds: Normal breath sounds. No wheezing. Abdominal:      General: Bowel sounds are normal. There is no distension. Palpations: Abdomen is soft. There is no mass. Musculoskeletal:         General: No tenderness. Normal range of motion. Cervical back: Normal range of motion and neck supple. No rigidity. No muscular tenderness. Comments: Feet:warm, good capillary refill, no trophic changes or ulcerative lesions, normal DP and PT pulses and reduced sensation at both feet. Has yellow discoloration with thickening of all toes of both feet   Lymphadenopathy:      Cervical: No cervical adenopathy. Skin:     General: Skin is warm and dry. Findings: No rash. Neurological:      Mental Status: He is alert and oriented to person, place, and time. Cranial Nerves: No cranial nerve deficit. Deep Tendon Reflexes: Reflexes are normal and symmetric. Reflexes normal.         ASSESSMENT and PLAN  Diagnoses and all orders for this visit:    1. Controlled type 2 diabetes mellitus with diabetic autonomic neuropathy, with long-term current use of insulin ([address]  Assessment & Plan:   well controlled, continue current medications pending work up below, medication adherence emphasized, lifestyle modifications recommended    Orders:  -      DIABETES FOOT EXAM  -     HEMOGLOBIN A1C WITH EAG; Future  -     LIPID PANEL; Future  -     METABOLIC PANEL, COMPREHENSIVE; Future    2.  Type 2 diabetes with nephropathy ([facility]  Assessment & Plan:   well controlled, continue current medications pending work up below, medication adherence emphasized, lifestyle modifications recommended    Orders:  -      DIABETES FOOT EXAM  -     HEMOGLOBIN A1C WITH EAG; Future  -     LIPID PANEL; Future  -     METABOLIC PANEL, COMPREHENSIVE; Future    3. Pulmonary emphysema, unspecified emphysema type ([facility].)  Assessment & Plan:   asymptomatic, continue current medications, medication adherence emphasized, lifestyle modifications recommended, Strongly recommended to stop smoking. Orders:  -     CBC WITH AUTOMATED DIFF; Future    4. Thrombocytopenia ([facility].)  Assessment & Plan:   monitored by specialist. No acute findings meriting change in the plan follows [address] [facility], Dr. Mono Perez    Orders:  -     [address] DIFF; Future    5. Nonrheumatic aortic valve stenosis  -     LIPID PANEL; Future    6. Smoking greater than 20 pack years  -     nicotine (NICODERM CQ) 21 mg/24 hr; 1 Patch by TransDERmal route every twenty-four (24) hours for 30 days. 7. Chronic pain of multiple joints  -     REFERRAL TO PAIN MANAGEMENT    8. Primary osteoarthritis of both wrists    9. Benign essential HTN  -     METABOLIC PANEL, COMPREHENSIVE; Future    Discussed wrist and hand exercises for arthritis. Recommended to take Tylenol. Referred to pain management if his symptoms are not getting better with medication and exercise. He cannot be on any NSAIDs due to his underlying medical conditions and medications. Discussed lifestyle issues and health guidance given  Patient was given an after visit summary which includes diagnoses, vital signs, current medications, instructions and references & authorized prescriptions . Results of labs will be conveyed to patient, once available. Pt verbalized instructions I provided and expressed understanding of discussion that was held today.   Follow-up and Dispositions    · Return in about 4 months (around 11/19/2021) for follow up, fasting.

## 2021-07-19 NOTE — ASSESSMENT & PLAN NOTE
asymptomatic, continue current medications, medication adherence emphasized, lifestyle modifications recommended, Strongly recommended to stop smoking.

## 2021-07-19 NOTE — PROGRESS NOTES
Olden Alert Derrenbacker is a [de-identified] y.o. male    Chief Complaint   Patient presents with    Follow-up     Type 2 diabetes; arthritis pain in hands and wrist bilateraly; wants to discuss medications; Interested in stopping smoking, Interested in meds or patches       1. Have you been to the ER, urgent care clinic since your last visit? Hospitalized since your last visit? No    2. Have you seen or consulted any other health care providers outside of the [address] [place] [address] since your last visit? Include any pap smears or colon screening.  No    Visit Vitals  BP (!) 149/71 (BP 1 Location: Left upper arm, BP Patient Position: Sitting)   Pulse 67   Temp 98.3 °F (36.8 °C) (Temporal)   Ht 5' 11\" (1.803 m)   Wt 196 lb (88.9 kg)   SpO2 99%   BMI 27.34 kg/m²

## 2021-07-19 NOTE — ASSESSMENT & PLAN NOTE
monitored by specialist. No acute findings meriting change in the plan follows [facility], Dr. Ashley Slater

## 2021-07-20 LAB
ALBUMIN SERPL-MCNC: 3.6 G/DL (ref 3.5–5)
ALBUMIN/GLOB SERPL: 1.1 {RATIO} (ref 1.1–2.2)
ALP SERPL-CCNC: 81 U/L (ref 45–117)
ALT SERPL-CCNC: 16 U/L (ref 12–78)
ANION GAP SERPL CALC-SCNC: 4 MMOL/L (ref 5–15)
AST SERPL-CCNC: 16 U/L (ref 15–37)
BASOPHILS # BLD: 0.1 K/UL (ref 0–0.1)
BASOPHILS NFR BLD: 1 % (ref 0–1)
BILIRUB SERPL-MCNC: 0.6 MG/DL (ref 0.2–1)
BUN SERPL-MCNC: 19 MG/DL (ref 6–20)
BUN/CREAT SERPL: 18 (ref 12–20)
CALCIUM SERPL-MCNC: 8.7 MG/DL (ref 8.5–10.1)
CHLORIDE SERPL-SCNC: 108 MMOL/L (ref 97–108)
CHOLEST SERPL-MCNC: 151 MG/DL
CO2 SERPL-SCNC: 26 MMOL/L (ref 21–32)
CREAT SERPL-MCNC: 1.06 MG/DL (ref 0.7–1.3)
DIFFERENTIAL METHOD BLD: ABNORMAL
EOSINOPHIL # BLD: 0.2 K/UL (ref 0–0.4)
EOSINOPHIL NFR BLD: 4 % (ref 0–7)
EST. AVERAGE GLUCOSE BLD GHB EST-MCNC: 108 MG/DL
GLOBULIN SER CALC-MCNC: 3.3 G/DL (ref 2–4)
GLUCOSE SERPL-MCNC: 129 MG/DL (ref 65–100)
HBA1C MFR BLD: 5.4 % (ref 4–5.6)
HCT VFR BLD AUTO: 29.8 % (ref 36.6–50.3)
HDLC SERPL-MCNC: 41 MG/DL
HDLC SERPL: 3.7 {RATIO} (ref 0–5)
HGB BLD-MCNC: 10.9 G/DL (ref 12.1–17)
IMM GRANULOCYTES # BLD AUTO: 0.1 K/UL (ref 0–0.04)
IMM GRANULOCYTES NFR BLD AUTO: 1 % (ref 0–0.5)
LDLC SERPL CALC-MCNC: 93.6 MG/DL (ref 0–100)
LYMPHOCYTES # BLD: 1.1 K/UL (ref 0.8–3.5)
LYMPHOCYTES NFR BLD: 20 % (ref 12–49)
MCH RBC QN AUTO: 39.6 PG (ref 26–34)
MCHC RBC AUTO-ENTMCNC: 36.6 G/DL (ref 30–36.5)
MCV RBC AUTO: 108.4 FL (ref 80–99)
MONOCYTES # BLD: 0.4 K/UL (ref 0–1)
MONOCYTES NFR BLD: 8 % (ref 5–13)
NEUTS SEG # BLD: 3.4 K/UL (ref 1.8–8)
NEUTS SEG NFR BLD: 66 % (ref 32–75)
NRBC # BLD: 0 K/UL (ref 0–0.01)
NRBC BLD-RTO: 0 PER 100 WBC
PLATELET # BLD AUTO: 71 K/UL (ref 150–400)
PLATELET COMMENTS,PCOM: ABNORMAL
PMV BLD AUTO: 11.3 FL (ref 8.9–12.9)
POTASSIUM SERPL-SCNC: 4.8 MMOL/L (ref 3.5–5.1)
PROT SERPL-MCNC: 6.9 G/DL (ref 6.4–8.2)
RBC # BLD AUTO: 2.75 M/UL (ref 4.1–5.7)
RBC MORPH BLD: ABNORMAL
SODIUM SERPL-SCNC: 138 MMOL/L (ref 136–145)
TRIGL SERPL-MCNC: 82 MG/DL (ref ?–150)
VLDLC SERPL CALC-MCNC: 16.4 MG/DL
WBC # BLD AUTO: 5.3 K/UL (ref 4.1–11.1)

## 2021-07-23 NOTE — PROGRESS NOTES
Please inform patient and fax results to Dr. Glenis Tillman, hematologyCBC shows low RBC, low hemoglobin likely from silent blood loss. His platelets are low from his thrombocytopenia but are very stable. Kidney and liver functions are normal except elevated fasting sugar but average blood sugar is still in the normal range, 5.5. No change in current insulin dose and to continue with 6 units twice daily. Cholesterol results are excellent. Overall stable results except low hemoglobin and RBC. Will route results to hematology. Thanks

## 2021-07-23 NOTE — PROGRESS NOTES
Attempted to call patient, unable to leave message. Letter placed in the mail. Results faxed to hematology.

## 2021-08-12 DIAGNOSIS — F17.210 SMOKING GREATER THAN 20 PACK YEARS: ICD-10-CM

## 2021-08-12 RX ORDER — IBUPROFEN 200 MG
TABLET ORAL
Qty: 28 PATCH | Refills: 1 | Status: SHIPPED | OUTPATIENT
Start: 2021-08-12 | End: 2021-01-01 | Stop reason: SDUPTHER

## 2021-08-30 NOTE — TELEPHONE ENCOUNTER
MD Chloe Ward,    Patient call requesting 90 d/s of tamsulosin and gabapentin.  (gabapentin already sent 8/29/21). Thanks, Delsie Galeazzi    Last Visit: 4/19/21 MD Chloe Ward  Next Appointment: 11/18/21 MD Chloe Ward  Previous Refill Encounter(s): 3/8/21 90    Requested Prescriptions     Pending Prescriptions Disp Refills    tamsulosin (FLOMAX) 0.4 mg capsule 90 Capsule 1     Sig: Take 1 Capsule by mouth daily.

## 2021-11-18 NOTE — PROGRESS NOTES
Chief Complaint   Patient presents with    Diabetes     follow up         1. \"Have you been to the ER, urgent care clinic since your last visit? Hospitalized since your last visit? \" No    2. \"Have you seen or consulted any other health care providers outside of the [address] [place] [address] since your last visit? \" Yes When: a few months ago Where: [facility] [place] Reason for visit: surgery     3. For patients over 45: Has the patient had a colonoscopy?  No         3 most recent PHQ Screens 11/18/2021   PHQ Not Done -   Little interest or pleasure in doing things Not at all   Feeling down, depressed, irritable, or hopeless Not at all   Total Score PHQ 2 0       Health Maintenance Due   Topic Date Due    Shingrix Vaccine Age 50> (1 of 2) Never done    COVID-19 Vaccine (3 - Booster) 10/18/2021

## 2021-11-18 NOTE — PROGRESS NOTES
HISTORY OF PRESENT ILLNESS  Ermelinda Heredia is a [de-identified] y.o. male. Patient was seen today for 4 months follow-up on diabetes, hypertension, COPD, neuropathy. He follows hematology for thrombocytopenia, urology for history of bladder cancer, cardiology for aortic stenosis. HPI  Endocrine Review  He is seen for diabetes.  Since last visit he reports: no polyuria or polydipsia, no chest pain, dyspnea or TIA's, no unusual visual symptoms, no hypoglycemia, no medication side effects noted, has noticed numbness both feet.  Home glucose monitoring: is performed regularly, fasting values range 140-200  He is checking his sugars 2 per day.  He reports medication compliance: compliant all of the time.  Medication side effects: none.  Diabetic diet compliance: compliant all of the time.  Lab review: labs reviewed and discussed with patient.  Eye exam: overdue.  Referral done  His sliding scale Insulin was changed to NPH 6 units bid on last visit  Follows KAROLINE for his eye check up. Is advised to get cataract surgery, but he doesn't want to     Lab Results   Component Value Date/Time    Hemoglobin A1c 5.4 07/19/2021 12:15 PM    Hemoglobin A1c (POC) 5.4 06/28/2017 11:27 AM        COPD  Patient complains of cough, fatigue and shortness of breath. Symptoms began several years ago. Symptoms chronic dyspnea: severity = mild: course of sx: well controlled  becomes dyspneic after 5 blocks  symptoms worse with exertion. does worsen with exertion. Sputum is clear in small amounts.  Patient currently is not on home oxygen therapy. Corey Mcconnell Respiratory history: pneumonia, COPD and history of 20  pack years tobacco use  Follows Dr Jacky England. PFT not c/w obstructive disease. He was advised to continue on Dulera and prn Albuterol. in spite of counseling, he is still smoking. He has not followed up with Pulmonology for a long period   As per him he takes Dulera that I prescribed before and that Only taking as needed.           H/o ITP.  As per  Hero Dillon, have discussed platelet transfusion in past,in denial     Lab Results   Component Value Date/Time    WBC 5.3 07/19/2021 12:15 PM    HGB 10.9 (L) 07/19/2021 12:15 PM    HCT 29.8 (L) 07/19/2021 12:15 PM    PLATELET 71 (L) 01/89/5898 12:15 PM    .4 (H) 07/19/2021 12:15 PM          Taking Furosemide along with Potassium supplement for chronic leg swellings      Review of Systems   Constitutional: Negative for chills, fever and malaise/fatigue. HENT: Negative for congestion, ear pain, sore throat and tinnitus. Eyes: Negative for blurred vision, double vision, pain and discharge. Respiratory: Negative for cough, shortness of breath and wheezing. Cardiovascular: Negative for chest pain, palpitations and leg swelling. Gastrointestinal: Negative for abdominal pain, blood in stool, constipation, diarrhea, nausea and vomiting. Genitourinary: Negative for dysuria, frequency, hematuria and urgency. Musculoskeletal: Negative for back pain, joint pain and myalgias. Skin: Negative for rash. Neurological: Negative for dizziness, tremors, seizures and headaches. Endo/Heme/Allergies: Negative for polydipsia. Does not bruise/bleed easily. Psychiatric/Behavioral: Negative for depression and substance abuse. The patient is not nervous/anxious. Physical Exam  Vitals and nursing note reviewed. Constitutional:       Appearance: He is well-developed. He is not diaphoretic. HENT:      Head: Normocephalic and atraumatic. Right Ear: External ear normal.      Mouth/Throat:      Pharynx: No oropharyngeal exudate. Eyes:      General: No scleral icterus. Conjunctiva/sclera: Conjunctivae normal.      Pupils: Pupils are equal, round, and reactive to light. Neck:      Thyroid: No thyromegaly. Vascular: No JVD. Cardiovascular:      Rate and Rhythm: Normal rate and regular rhythm. Heart sounds: Murmur heard.     Crescendo systolic murmur is present with a grade of 3/6.      Pulmonary:      Effort: Pulmonary effort is normal.      Breath sounds: Normal breath sounds. No wheezing. Abdominal:      General: Bowel sounds are normal. There is no distension. Palpations: Abdomen is soft. There is no mass. Musculoskeletal:         General: No tenderness. Normal range of motion. Cervical back: Normal range of motion and neck supple. Lymphadenopathy:      Cervical: No cervical adenopathy. Skin:     General: Skin is warm and dry. Findings: No rash. Neurological:      Mental Status: He is alert and oriented to person, place, and time. Cranial Nerves: No cranial nerve deficit. Deep Tendon Reflexes: Reflexes are normal and symmetric. Reflexes normal.         ASSESSMENT and PLAN  Diagnoses and all orders for this visit:    1. Type 2 diabetes with nephropathy ([facility]  Assessment & Plan:   well controlled, continue current medications pending work up below, medication adherence emphasized, lifestyle modifications recommended    Orders:  -     MICROALBUMIN, UR, RAND W/ MICROALB/CREAT RATIO; Future  -     HEMOGLOBIN A1C WITH EAG; Future  -     LIPID PANEL; Future    2. Thrombocytopenia ([facility]  Assessment & Plan:   monitored by specialist. No acute findings meriting change in the plan  Stable. Follows DR Lesli Smallwood    Orders:  -     CBC WITH AUTOMATED DIFF; Future    3. Pulmonary emphysema, unspecified emphysema type ([facility]  Assessment & Plan:   unclear control, medication adherence emphasized, lifestyle modifications recommended, Not using his inhaler as prescribed. Working on decreasing cigarette smoking      4.  Controlled type 2 diabetes mellitus with diabetic autonomic neuropathy, with long-term current use of insulin ([facility]  Assessment & Plan:   well controlled, continue current medications, medication adherence emphasized, lifestyle modifications recommended    Orders:  -     MICROALBUMIN, UR, RAND W/ MICROALB/CREAT RATIO; Future  -     HEMOGLOBIN A1C WITH EAG; Future  -     LIPID PANEL; Future    5. History of bladder cancer    6. Benign essential HTN  -     METABOLIC PANEL, COMPREHENSIVE; Future    7. Nonrheumatic aortic valve stenosis    Discussed lifestyle issues and health guidance given  Patient was given an after visit summary which includes diagnoses, vital signs, current medications, instructions and references & authorized prescriptions . Results of labs will be conveyed to patient, once available. Pt verbalized instructions I provided and expressed understanding of discussion that was held today. Follow-up and Dispositions    · Return in about 4 months (around 3/18/2022) for medicare wellness, fasting. Please note that this dictation was completed with [employer], the [employer] voice recognition software. Quite often unanticipated grammatical, syntax, homophones, and other interpretive errors are inadvertently transcribed by the computer software. Please disregard these errors. Please excuse any errors that have escaped final proofreading. Thank you.

## 2021-11-18 NOTE — ASSESSMENT & PLAN NOTE
unclear control, medication adherence emphasized, lifestyle modifications recommended, Not using his inhaler as prescribed.   Working on decreasing cigarette smoking

## 2021-11-18 NOTE — ASSESSMENT & PLAN NOTE
monitored by specialist. No acute findings meriting change in the plan  Stable.  Follows DR Riki Ryan

## 2021-11-19 NOTE — PROGRESS NOTES
Called patient. Two patient identifiers verified. Discussed lab results per provider's note. He Verbalized understanding.  Letter placed in the mail and results faxed to [facility] [address]

## 2021-11-19 NOTE — PROGRESS NOTES
Please inform patient and fax results to Dr. Winston Ambriz, hematology,Fax: [phone] count shows low red blood cell count. Hemoglobin is improved from last blood work report as well as platelet count have been very stable. All other results including kidney and liver function, cholesterol results and average blood sugar A1c, (5.3) are very normal.Urine is positive for protein but improving. No change in current medications and to continue with current dose of insulin. Thanks

## 2021-12-13 NOTE — PROGRESS NOTES
[facility] [place]: Eren Rich  [phone]    History of Present Illness:   Mr. Chito Patel is [de-identified] yo M with mild to moderate aortic stenosis, RBBB, COPD, HTN. Echo 2018 with normal LV function with moderate aortic stenosis, mean gradient of 24 mmHg. This is slightly increased from 17 mmHg in 2016. H/o bladder cancer and follows Dr Taye Aiken s/p MAURICE. Followed by hematology for chronic thrombocytopenia. Since his last visit, overall he has been feeling okay. He does have some baseline shortness of breath, but this is unchanged. No exertional chest pain. No significant palpitations, lightheadedness or dizziness. He is on insulin for his diabetes. He did stop smoking since July. He is compensated on exam with clear lungs and III-IV/VI systolic murmur at the left sternal border. His echocardiogram last time demonstrated preserved LV function, but at least moderate aortic stenosis with a mean gradient of 32 mmHg. Assessment and Plan:   1. Moderate aortic stenosis. Continues asymptomatic and his gradient has been in the low 30s range. Will have him follow up with a same day echocardiogram in six months. We did talk in the past about possibility of TAVR, but not indicated at this time. 2. Essential hypertension. Blood pressure is controlled. 3. Tobacco use. Continue to encourage cessation. 4. Low platelet count. Followed by Dr. Aiden Estrella. He  has a past medical history of COPD (chronic obstructive pulmonary disease) ([facility] Diabetes ([facility] Enlarged prostate, Moderate aortic stenosis, and Pneumonia. All other systems negative except as above. PE  Vitals:    12/13/21 1324   BP: 110/70   Pulse: 68   Resp: 16   SpO2: 99%   Weight: 195 lb (88.5 kg)   Height: 5' 11\" (1.803 m)    Body mass index is 27.2 kg/m².    General appearance - alert, well appearing, and in no distress  Mental status - affect appropriate to mood  Eyes - sclera anicteric, moist mucous membranes  Neck - supple, no JVD  Chest - clear to auscultation, no wheezes, rales or rhonchi  Heart - normal rate, regular rhythm, normal S1, S2, II-III/IV systolic murmur LUSB  Abdomen - soft, nontender, nondistended, no masses or organomegaly  Extremities - peripheral pulses normal, no pedal edema    Recent Labs:  Lab Results   Component Value Date/Time    Cholesterol, total 159 11/18/2021 12:50 PM    HDL Cholesterol 37 11/18/2021 12:50 PM    LDL, calculated 92.8 11/18/2021 12:50 PM    Triglyceride 146 11/18/2021 12:50 PM    CHOL/HDL Ratio 4.3 11/18/2021 12:50 PM     Lab Results   Component Value Date/Time    Creatinine 1.12 11/18/2021 12:50 PM     Lab Results   Component Value Date/Time    BUN 21 (H) 11/18/2021 12:50 PM     Lab Results   Component Value Date/Time    Potassium 4.5 11/18/2021 12:50 PM     Lab Results   Component Value Date/Time    Hemoglobin A1c 5.3 11/18/2021 12:50 PM     Lab Results   Component Value Date/Time    HGB 11.3 (L) 11/18/2021 12:50 PM     Lab Results   Component Value Date/Time    PLATELET 87 (L) 08/91/8704 12:50 PM       Reviewed:  Past Medical History:   Diagnosis Date    COPD [facility]chronic obstructive [facility] disease) ([facility]     Diabetes ([facility]     Enlarged prostate     Moderate aortic stenosis     echo June 2016 Sher Fontan     Pneumonia      Social History     Tobacco Use   Smoking Status Current Every Day Smoker    Packs/day: 0.50   Smokeless Tobacco Never Used   Tobacco Comment    1 pack a day  ( 16-18 ) a day      Social History     Substance and Sexual Activity   Alcohol Use Yes    Alcohol/week: 14.0 standard drinks    Types: 14 Cans of beer per week    Comment: Social      No Known Allergies    Current Outpatient Medications   Medication Sig    gabapentin (NEURONTIN) 300 mg capsule TAKE 1 CAPSULE BY MOUTH EVERY NIGHT    nicotine (NICODERM CQ) 21 mg/24 hr APPLY 1 PATCH TO SKIN ONCE EVERY 24 HOURS    tamsulosin (FLOMAX) 0.4 mg capsule TAKE 1 CAPSULE BY MOUTH DAILY    insulin NPH/insulin regular (NovoLIN 70/30 U-100 Insulin) 100 unit/mL (70-30) injection 6 Units by SubCUTAneous route Before breakfast and dinner.  BD Insulin Syringe Ultra-Fine 0.5 mL 30 gauge x 1/2\" syrg USE TWICE DAILY TO INJECT INSULIN    potassium chloride (K-DUR, KLOR-CON) 20 mEq tablet Take 1 Tab by mouth daily.  furosemide (LASIX) 40 mg tablet Take 1 Tab by mouth daily.  Insulin Syringe-Needle U-100 (BD Insulin Syringe Ultra-Fine) 0.3 mL 31 gauge x 5/16\" syrg Use twice daily to inject insulin.  metFORMIN ER (GLUCOPHAGE XR) 500 mg tablet Take 1 Tab by mouth daily (with dinner).  glucose blood VI test strips (LIBRADO BLOOD GLUCOSE TEST STRIP) strip USE TO TEST TWICE DAILY    multivit with minerals/lutein (MULTIVITAMIN 50 PLUS PO) Take  by mouth.  albuterol (PROAIR HFA) 90 mcg/actuation inhaler Take 1 Puff by inhalation.  mometasone-formoterol (DULERA) 100-5 mcg/actuation HFA inhaler INL 2 PFS PO BID    ONETOUCH ULTRA TEST strip USE TO TEST TWICE DAILY     No current facility-administered medications for this visit.        Carlos An MD  [facility] heart and Vascular [place]  [address] [facility] [address]  [address]

## 2022-01-01 ENCOUNTER — PATIENT OUTREACH (OUTPATIENT)
Dept: CASE MANAGEMENT | Age: 82
End: 2022-01-01

## 2022-01-01 ENCOUNTER — PATIENT OUTREACH (OUTPATIENT)
Dept: FAMILY MEDICINE CLINIC | Age: 82
End: 2022-01-01

## 2022-01-01 ENCOUNTER — TELEPHONE (OUTPATIENT)
Dept: CARDIOLOGY CLINIC | Age: 82
End: 2022-01-01

## 2022-01-01 ENCOUNTER — DOCUMENTATION ONLY (OUTPATIENT)
Dept: CARDIOLOGY CLINIC | Age: 82
End: 2022-01-01

## 2022-01-01 ENCOUNTER — ANCILLARY PROCEDURE (OUTPATIENT)
Dept: CARDIOLOGY CLINIC | Age: 82
End: 2022-01-01
Payer: MEDICARE

## 2022-01-01 ENCOUNTER — TELEPHONE (OUTPATIENT)
Dept: FAMILY MEDICINE CLINIC | Age: 82
End: 2022-01-01

## 2022-01-01 ENCOUNTER — OFFICE VISIT (OUTPATIENT)
Dept: CARDIOLOGY CLINIC | Age: 82
End: 2022-01-01

## 2022-01-01 ENCOUNTER — HOSPITAL ENCOUNTER (OUTPATIENT)
Age: 82
Setting detail: OUTPATIENT SURGERY
Discharge: HOME | End: 2022-03-25
Attending: INTERNAL MEDICINE | Admitting: INTERNAL MEDICINE
Payer: MEDICARE

## 2022-01-01 ENCOUNTER — OFFICE VISIT (OUTPATIENT)
Dept: CARDIOLOGY CLINIC | Age: 82
End: 2022-01-01
Payer: MEDICARE

## 2022-01-01 ENCOUNTER — APPOINTMENT (OUTPATIENT)
Dept: GENERAL RADIOLOGY | Age: 82
End: 2022-01-01
Attending: NURSE PRACTITIONER
Payer: MEDICARE

## 2022-01-01 ENCOUNTER — CLINICAL SUPPORT (OUTPATIENT)
Dept: CARDIOLOGY CLINIC | Age: 82
End: 2022-01-01

## 2022-01-01 ENCOUNTER — OFFICE VISIT (OUTPATIENT)
Dept: FAMILY MEDICINE CLINIC | Age: 82
End: 2022-01-01
Payer: MEDICARE

## 2022-01-01 ENCOUNTER — OFFICE VISIT (OUTPATIENT)
Dept: FAMILY MEDICINE CLINIC | Age: 82
End: 2022-01-01

## 2022-01-01 ENCOUNTER — CLINICAL SUPPORT (OUTPATIENT)
Dept: CARDIOLOGY CLINIC | Age: 82
End: 2022-01-01
Payer: MEDICARE

## 2022-01-01 VITALS
BODY MASS INDEX: 28.28 KG/M2 | HEART RATE: 60 BPM | RESPIRATION RATE: 19 BRPM | HEIGHT: 71 IN | WEIGHT: 202 LBS | DIASTOLIC BLOOD PRESSURE: 73 MMHG | SYSTOLIC BLOOD PRESSURE: 176 MMHG | OXYGEN SATURATION: 97 % | TEMPERATURE: 97.7 F

## 2022-01-01 VITALS
HEART RATE: 72 BPM | RESPIRATION RATE: 16 BRPM | OXYGEN SATURATION: 96 % | SYSTOLIC BLOOD PRESSURE: 99 MMHG | BODY MASS INDEX: 29.85 KG/M2 | WEIGHT: 213.2 LBS | DIASTOLIC BLOOD PRESSURE: 60 MMHG | TEMPERATURE: 97.7 F | HEIGHT: 71 IN

## 2022-01-01 VITALS — BODY MASS INDEX: 28.42 KG/M2 | WEIGHT: 203 LBS | HEIGHT: 71 IN

## 2022-01-01 VITALS
SYSTOLIC BLOOD PRESSURE: 136 MMHG | DIASTOLIC BLOOD PRESSURE: 78 MMHG | HEART RATE: 82 BPM | BODY MASS INDEX: 28.42 KG/M2 | RESPIRATION RATE: 24 BRPM | HEIGHT: 71 IN | WEIGHT: 203 LBS | OXYGEN SATURATION: 98 %

## 2022-01-01 VITALS
SYSTOLIC BLOOD PRESSURE: 132 MMHG | DIASTOLIC BLOOD PRESSURE: 60 MMHG | TEMPERATURE: 98.2 F | WEIGHT: 197 LBS | HEIGHT: 71 IN | HEART RATE: 41 BPM | RESPIRATION RATE: 16 BRPM | OXYGEN SATURATION: 97 % | BODY MASS INDEX: 27.58 KG/M2

## 2022-01-01 VITALS — BODY MASS INDEX: 28.28 KG/M2 | WEIGHT: 202 LBS | HEIGHT: 71 IN

## 2022-01-01 VITALS
WEIGHT: 196.4 LBS | BODY MASS INDEX: 27.5 KG/M2 | HEIGHT: 71 IN | SYSTOLIC BLOOD PRESSURE: 130 MMHG | RESPIRATION RATE: 13 BRPM | DIASTOLIC BLOOD PRESSURE: 70 MMHG | OXYGEN SATURATION: 97 % | HEART RATE: 79 BPM

## 2022-01-01 VITALS
DIASTOLIC BLOOD PRESSURE: 60 MMHG | WEIGHT: 196 LBS | HEIGHT: 71 IN | SYSTOLIC BLOOD PRESSURE: 124 MMHG | BODY MASS INDEX: 27.44 KG/M2

## 2022-01-01 VITALS
WEIGHT: 202 LBS | BODY MASS INDEX: 28.28 KG/M2 | OXYGEN SATURATION: 99 % | RESPIRATION RATE: 18 BRPM | DIASTOLIC BLOOD PRESSURE: 75 MMHG | SYSTOLIC BLOOD PRESSURE: 140 MMHG | HEART RATE: 72 BPM | HEIGHT: 71 IN | TEMPERATURE: 98.6 F

## 2022-01-01 VITALS
RESPIRATION RATE: 18 BRPM | HEART RATE: 58 BPM | HEIGHT: 71 IN | BODY MASS INDEX: 28.28 KG/M2 | SYSTOLIC BLOOD PRESSURE: 130 MMHG | WEIGHT: 202 LBS | OXYGEN SATURATION: 100 % | DIASTOLIC BLOOD PRESSURE: 72 MMHG

## 2022-01-01 DIAGNOSIS — Z79.4 CONTROLLED TYPE 2 DIABETES MELLITUS WITH DIABETIC AUTONOMIC NEUROPATHY, WITH LONG-TERM CURRENT USE OF INSULIN (HCC): ICD-10-CM

## 2022-01-01 DIAGNOSIS — I35.0 SEVERE AORTIC STENOSIS: ICD-10-CM

## 2022-01-01 DIAGNOSIS — L08.9: ICD-10-CM

## 2022-01-01 DIAGNOSIS — E11.40 TYPE 2 DIABETES MELLITUS WITH DIABETIC NEUROPATHY, WITH LONG-TERM CURRENT USE OF INSULIN (HCC): ICD-10-CM

## 2022-01-01 DIAGNOSIS — E11.21 TYPE 2 DIABETES WITH NEPHROPATHY (HCC): ICD-10-CM

## 2022-01-01 DIAGNOSIS — R60.9 EDEMA, UNSPECIFIED TYPE: ICD-10-CM

## 2022-01-01 DIAGNOSIS — I10 BENIGN ESSENTIAL HTN: ICD-10-CM

## 2022-01-01 DIAGNOSIS — L03.116 CELLULITIS OF LEFT LOWER EXTREMITY: ICD-10-CM

## 2022-01-01 DIAGNOSIS — F17.210 SMOKING GREATER THAN 20 PACK YEARS: ICD-10-CM

## 2022-01-01 DIAGNOSIS — Z95.0 PACEMAKER: ICD-10-CM

## 2022-01-01 DIAGNOSIS — I20.0 UNSTABLE ANGINA (HCC): Primary | ICD-10-CM

## 2022-01-01 DIAGNOSIS — I44.2 THIRD DEGREE AV BLOCK (HCC): ICD-10-CM

## 2022-01-01 DIAGNOSIS — R35.0 BENIGN PROSTATIC HYPERPLASIA WITH URINARY FREQUENCY: ICD-10-CM

## 2022-01-01 DIAGNOSIS — Z72.0 TOBACCO USE: ICD-10-CM

## 2022-01-01 DIAGNOSIS — Z79.4 TYPE 2 DIABETES MELLITUS WITH DIABETIC NEUROPATHY, WITH LONG-TERM CURRENT USE OF INSULIN (HCC): ICD-10-CM

## 2022-01-01 DIAGNOSIS — R00.1 SYMPTOMATIC SINUS BRADYCARDIA: ICD-10-CM

## 2022-01-01 DIAGNOSIS — I50.23 SYSTOLIC CHF, ACUTE ON CHRONIC (HCC): Primary | ICD-10-CM

## 2022-01-01 DIAGNOSIS — I35.0 NONRHEUMATIC AORTIC VALVE STENOSIS: ICD-10-CM

## 2022-01-01 DIAGNOSIS — Z95.0 CARDIAC PACEMAKER IN SITU: Primary | ICD-10-CM

## 2022-01-01 DIAGNOSIS — J96.01 ACUTE RESPIRATORY FAILURE WITH HYPOXIA (HCC): ICD-10-CM

## 2022-01-01 DIAGNOSIS — J43.9 PULMONARY EMPHYSEMA, UNSPECIFIED EMPHYSEMA TYPE (HCC): Primary | ICD-10-CM

## 2022-01-01 DIAGNOSIS — I20.0 UNSTABLE ANGINA (HCC): ICD-10-CM

## 2022-01-01 DIAGNOSIS — R00.1 BRADYCARDIA: ICD-10-CM

## 2022-01-01 DIAGNOSIS — J43.9 PULMONARY EMPHYSEMA, UNSPECIFIED EMPHYSEMA TYPE (HCC): ICD-10-CM

## 2022-01-01 DIAGNOSIS — Z87.891 HISTORY OF TOBACCO USE: ICD-10-CM

## 2022-01-01 DIAGNOSIS — E11.43 CONTROLLED TYPE 2 DIABETES MELLITUS WITH DIABETIC AUTONOMIC NEUROPATHY, WITH LONG-TERM CURRENT USE OF INSULIN (HCC): ICD-10-CM

## 2022-01-01 DIAGNOSIS — I49.5 SSS (SICK SINUS SYNDROME) (HCC): ICD-10-CM

## 2022-01-01 DIAGNOSIS — I35.0 MODERATE AORTIC STENOSIS: ICD-10-CM

## 2022-01-01 DIAGNOSIS — R42 DIZZINESS: ICD-10-CM

## 2022-01-01 DIAGNOSIS — Z13.31 SCREENING FOR DEPRESSION: ICD-10-CM

## 2022-01-01 DIAGNOSIS — D69.6 THROMBOCYTOPENIA (HCC): ICD-10-CM

## 2022-01-01 DIAGNOSIS — L98.491: ICD-10-CM

## 2022-01-01 DIAGNOSIS — N40.1 BENIGN PROSTATIC HYPERPLASIA WITH URINARY FREQUENCY: ICD-10-CM

## 2022-01-01 DIAGNOSIS — R00.1 BRADYCARDIA: Primary | ICD-10-CM

## 2022-01-01 DIAGNOSIS — Z09 HOSPITAL DISCHARGE FOLLOW-UP: ICD-10-CM

## 2022-01-01 DIAGNOSIS — Z71.89 ADVANCED DIRECTIVES, COUNSELING/DISCUSSION: ICD-10-CM

## 2022-01-01 DIAGNOSIS — Z09 HOSPITAL DISCHARGE FOLLOW-UP: Primary | ICD-10-CM

## 2022-01-01 DIAGNOSIS — Z91.81 AT HIGH RISK FOR FALLS: ICD-10-CM

## 2022-01-01 DIAGNOSIS — B37.2 CANDIDAL DERMATITIS: ICD-10-CM

## 2022-01-01 DIAGNOSIS — Z00.00 MEDICARE ANNUAL WELLNESS VISIT, SUBSEQUENT: Primary | ICD-10-CM

## 2022-01-01 LAB
ALBUMIN SERPL-MCNC: 3.5 G/DL (ref 3.5–5)
ALBUMIN/GLOB SERPL: 1 {RATIO} (ref 1.1–2.2)
ALP SERPL-CCNC: 100 U/L (ref 45–117)
ALT SERPL-CCNC: 20 U/L (ref 12–78)
ANION GAP SERPL CALC-SCNC: 0 MMOL/L (ref 5–15)
ANION GAP SERPL CALC-SCNC: 7 MMOL/L (ref 5–15)
AST SERPL-CCNC: 16 U/L (ref 15–37)
BASOPHILS # BLD: 0.1 K/UL (ref 0–0.1)
BASOPHILS NFR BLD: 1 % (ref 0–1)
BILIRUB SERPL-MCNC: 0.5 MG/DL (ref 0.2–1)
BUN SERPL-MCNC: 30 MG/DL (ref 6–20)
BUN SERPL-MCNC: 33 MG/DL (ref 6–20)
BUN/CREAT SERPL: 21 (ref 12–20)
BUN/CREAT SERPL: 25 (ref 12–20)
CALCIUM SERPL-MCNC: 8.4 MG/DL (ref 8.5–10.1)
CALCIUM SERPL-MCNC: 9.6 MG/DL (ref 8.5–10.1)
CHLORIDE SERPL-SCNC: 106 MMOL/L (ref 97–108)
CHLORIDE SERPL-SCNC: 112 MMOL/L (ref 97–108)
CHOLEST SERPL-MCNC: 150 MG/DL
CO2 SERPL-SCNC: 26 MMOL/L (ref 21–32)
CO2 SERPL-SCNC: 27 MMOL/L (ref 21–32)
CREAT SERPL-MCNC: 1.2 MG/DL (ref 0.7–1.3)
CREAT SERPL-MCNC: 1.54 MG/DL (ref 0.7–1.3)
DIFFERENTIAL METHOD BLD: ABNORMAL
ECHO AO ROOT DIAM: 2.8 CM
ECHO AO ROOT INDEX: 1.32 CM/M2
ECHO AV AREA PEAK VELOCITY: 0.7 CM2
ECHO AV AREA PEAK VELOCITY: 0.9 CM2
ECHO AV AREA VTI: 0.7 CM2
ECHO AV AREA VTI: 0.9 CM2
ECHO AV AREA/BSA PEAK VELOCITY: 0.3 CM2/M2
ECHO AV AREA/BSA PEAK VELOCITY: 0.4 CM2/M2
ECHO AV AREA/BSA VTI: 0.3 CM2/M2
ECHO AV AREA/BSA VTI: 0.4 CM2/M2
ECHO AV MEAN GRADIENT: 40 MMHG
ECHO AV MEAN GRADIENT: 41 MMHG
ECHO AV MEAN VELOCITY: 3 M/S
ECHO AV MEAN VELOCITY: 3.1 M/S
ECHO AV PEAK GRADIENT: 57 MMHG
ECHO AV PEAK GRADIENT: 71 MMHG
ECHO AV PEAK VELOCITY: 3.8 M/S
ECHO AV PEAK VELOCITY: 4.2 M/S
ECHO AV VELOCITY RATIO: 0.21
ECHO AV VELOCITY RATIO: 0.26
ECHO AV VTI: 76.6 CM
ECHO AV VTI: 94.5 CM
ECHO EST RA PRESSURE: 3 MMHG
ECHO LA DIAMETER INDEX: 1.75 CM/M2
ECHO LA DIAMETER INDEX: 1.98 CM/M2
ECHO LA DIAMETER: 3.7 CM
ECHO LA DIAMETER: 4.2 CM
ECHO LA TO AORTIC ROOT RATIO: 1.32
ECHO LA VOL 2C: 81 ML (ref 18–58)
ECHO LA VOL 4C: 96 ML (ref 18–58)
ECHO LA VOL BP: 88 ML (ref 18–58)
ECHO LA VOL/BSA BIPLANE: 42 ML/M2 (ref 16–34)
ECHO LA VOLUME AREA LENGTH: 93 ML
ECHO LA VOLUME INDEX A2C: 38 ML/M2 (ref 16–34)
ECHO LA VOLUME INDEX A4C: 45 ML/M2 (ref 16–34)
ECHO LA VOLUME INDEX AREA LENGTH: 44 ML/M2 (ref 16–34)
ECHO LV E' LATERAL VELOCITY: 5 CM/S
ECHO LV E' SEPTAL VELOCITY: 5 CM/S
ECHO LV EDV A2C: 122 ML
ECHO LV EDV A2C: 156 ML
ECHO LV EDV A4C: 140 ML
ECHO LV EDV A4C: 142 ML
ECHO LV EDV BP: 133 ML (ref 67–155)
ECHO LV EDV BP: 156 ML (ref 67–155)
ECHO LV EDV INDEX A4C: 66 ML/M2
ECHO LV EDV INDEX A4C: 67 ML/M2
ECHO LV EDV INDEX BP: 63 ML/M2
ECHO LV EDV INDEX BP: 74 ML/M2
ECHO LV EDV NDEX A2C: 58 ML/M2
ECHO LV EDV NDEX A2C: 74 ML/M2
ECHO LV EJECTION FRACTION A2C: 28 %
ECHO LV EJECTION FRACTION A2C: 68 %
ECHO LV EJECTION FRACTION A4C: 37 %
ECHO LV EJECTION FRACTION A4C: 65 %
ECHO LV EJECTION FRACTION BIPLANE: 34 % (ref 55–100)
ECHO LV EJECTION FRACTION BIPLANE: 64 % (ref 55–100)
ECHO LV ESV A2C: 112 ML
ECHO LV ESV A2C: 39 ML
ECHO LV ESV A4C: 49 ML
ECHO LV ESV A4C: 88 ML
ECHO LV ESV BP: 103 ML (ref 22–58)
ECHO LV ESV BP: 47 ML (ref 22–58)
ECHO LV ESV INDEX A2C: 18 ML/M2
ECHO LV ESV INDEX A2C: 53 ML/M2
ECHO LV ESV INDEX A4C: 23 ML/M2
ECHO LV ESV INDEX A4C: 42 ML/M2
ECHO LV ESV INDEX BP: 22 ML/M2
ECHO LV ESV INDEX BP: 49 ML/M2
ECHO LV FRACTIONAL SHORTENING: 31 % (ref 28–44)
ECHO LV FRACTIONAL SHORTENING: 36 % (ref 28–44)
ECHO LV INTERNAL DIMENSION DIASTOLE INDEX: 2.45 CM/M2
ECHO LV INTERNAL DIMENSION DIASTOLE INDEX: 2.5 CM/M2
ECHO LV INTERNAL DIMENSION DIASTOLIC: 5.2 CM (ref 4.2–5.9)
ECHO LV INTERNAL DIMENSION DIASTOLIC: 5.3 CM (ref 4.2–5.9)
ECHO LV INTERNAL DIMENSION SYSTOLIC INDEX: 1.6 CM/M2
ECHO LV INTERNAL DIMENSION SYSTOLIC INDEX: 1.7 CM/M2
ECHO LV INTERNAL DIMENSION SYSTOLIC: 3.4 CM
ECHO LV INTERNAL DIMENSION SYSTOLIC: 3.6 CM
ECHO LV IVSD: 1.3 CM (ref 0.6–1)
ECHO LV IVSD: 1.4 CM (ref 0.6–1)
ECHO LV MASS 2D: 278.4 G (ref 88–224)
ECHO LV MASS 2D: 302.7 G (ref 88–224)
ECHO LV MASS INDEX 2D: 131.3 G/M2 (ref 49–115)
ECHO LV MASS INDEX 2D: 142.8 G/M2 (ref 49–115)
ECHO LV POSTERIOR WALL DIASTOLIC: 1.3 CM (ref 0.6–1)
ECHO LV POSTERIOR WALL DIASTOLIC: 1.3 CM (ref 0.6–1)
ECHO LV RELATIVE WALL THICKNESS RATIO: 0.49
ECHO LV RELATIVE WALL THICKNESS RATIO: 0.5
ECHO LVOT AREA: 3.1 CM2
ECHO LVOT AREA: 3.8 CM2
ECHO LVOT AV VTI INDEX: 0.21
ECHO LVOT AV VTI INDEX: 0.25
ECHO LVOT DIAM: 2 CM
ECHO LVOT DIAM: 2.2 CM
ECHO LVOT MEAN GRADIENT: 2 MMHG
ECHO LVOT MEAN GRADIENT: 3 MMHG
ECHO LVOT PEAK GRADIENT: 3 MMHG
ECHO LVOT PEAK GRADIENT: 5 MMHG
ECHO LVOT PEAK VELOCITY: 0.8 M/S
ECHO LVOT PEAK VELOCITY: 1.1 M/S
ECHO LVOT STROKE VOLUME INDEX: 23.6 ML/M2
ECHO LVOT STROKE VOLUME INDEX: 42.7 ML/M2
ECHO LVOT SV: 49.9 ML
ECHO LVOT SV: 90.4 ML
ECHO LVOT VTI: 15.9 CM
ECHO LVOT VTI: 23.8 CM
ECHO MV A VELOCITY: 1.28 M/S
ECHO MV A VELOCITY: 1.32 M/S
ECHO MV AREA PHT: 2.1 CM2
ECHO MV AREA VTI: 2 CM2
ECHO MV E DECELERATION TIME (DT): 280.1 MS
ECHO MV E DECELERATION TIME (DT): 364.1 MS
ECHO MV E VELOCITY: 0.87 M/S
ECHO MV E VELOCITY: 0.93 M/S
ECHO MV E/A RATIO: 0.68
ECHO MV E/A RATIO: 0.7
ECHO MV E/E' LATERAL: 18.6
ECHO MV E/E' RATIO (AVERAGED): 18.6
ECHO MV E/E' SEPTAL: 18.6
ECHO MV LVOT VTI INDEX: 1.93
ECHO MV MAX VELOCITY: 1.5 M/S
ECHO MV MEAN GRADIENT: 4 MMHG
ECHO MV MEAN VELOCITY: 0.9 M/S
ECHO MV PEAK GRADIENT: 9 MMHG
ECHO MV PRESSURE HALF TIME (PHT): 105.6 MS
ECHO MV VTI: 46 CM
ECHO PULMONARY ARTERY SYSTOLIC PRESSURE (PASP): 15 MMHG
ECHO RIGHT VENTRICULAR SYSTOLIC PRESSURE (RVSP): 15 MMHG
ECHO RV INTERNAL DIMENSION: 4.3 CM
ECHO RV TAPSE: 2.3 CM (ref 1.5–2)
ECHO TV REGURGITANT MAX VELOCITY: 1.74 M/S
ECHO TV REGURGITANT PEAK GRADIENT: 12 MMHG
EOSINOPHIL # BLD: 0.2 K/UL (ref 0–0.4)
EOSINOPHIL NFR BLD: 3 % (ref 0–7)
ERYTHROCYTE [DISTWIDTH] IN BLOOD BY AUTOMATED COUNT: 16.9 % (ref 11.5–14.5)
ERYTHROCYTE [DISTWIDTH] IN BLOOD BY AUTOMATED COUNT: 19.6 % (ref 11.5–14.5)
EST. AVERAGE GLUCOSE BLD GHB EST-MCNC: 114 MG/DL
GLOBULIN SER CALC-MCNC: 3.6 G/DL (ref 2–4)
GLUCOSE BLD STRIP.AUTO-MCNC: 116 MG/DL (ref 65–117)
GLUCOSE SERPL-MCNC: 111 MG/DL (ref 65–100)
GLUCOSE SERPL-MCNC: 98 MG/DL (ref 65–100)
HBA1C MFR BLD: 5.6 % (ref 4–5.6)
HCT VFR BLD AUTO: 28.9 % (ref 36.6–50.3)
HCT VFR BLD AUTO: 37.3 % (ref 36.6–50.3)
HDLC SERPL-MCNC: 34 MG/DL
HDLC SERPL: 4.4 {RATIO} (ref 0–5)
HGB BLD-MCNC: 10.7 G/DL (ref 12.1–17)
HGB BLD-MCNC: 11.8 G/DL (ref 12.1–17)
IMM GRANULOCYTES # BLD AUTO: 0.1 K/UL (ref 0–0.04)
IMM GRANULOCYTES NFR BLD AUTO: 1 % (ref 0–0.5)
LDLC SERPL CALC-MCNC: 93.8 MG/DL (ref 0–100)
LYMPHOCYTES # BLD: 1 K/UL (ref 0.8–3.5)
LYMPHOCYTES NFR BLD: 19 % (ref 12–49)
MCH RBC QN AUTO: 32.5 PG (ref 26–34)
MCH RBC QN AUTO: 39.2 PG (ref 26–34)
MCHC RBC AUTO-ENTMCNC: 31.6 G/DL (ref 30–36.5)
MCHC RBC AUTO-ENTMCNC: 37 G/DL (ref 30–36.5)
MCV RBC AUTO: 102.8 FL (ref 80–99)
MCV RBC AUTO: 105.9 FL (ref 80–99)
MONOCYTES # BLD: 0.5 K/UL (ref 0–1)
MONOCYTES NFR BLD: 10 % (ref 5–13)
NEUTS SEG # BLD: 3.4 K/UL (ref 1.8–8)
NEUTS SEG NFR BLD: 66 % (ref 32–75)
NRBC # BLD: 0 K/UL (ref 0–0.01)
NRBC # BLD: 0 K/UL (ref 0–0.01)
NRBC BLD-RTO: 0 PER 100 WBC
NRBC BLD-RTO: 0 PER 100 WBC
NUC STRESS EJECTION FRACTION: 61 %
PLATELET # BLD AUTO: 54 K/UL (ref 150–400)
PLATELET # BLD AUTO: 64 K/UL (ref 150–400)
PMV BLD AUTO: 11.1 FL (ref 8.9–12.9)
PMV BLD AUTO: 11.7 FL (ref 8.9–12.9)
POTASSIUM SERPL-SCNC: 4.6 MMOL/L (ref 3.5–5.1)
POTASSIUM SERPL-SCNC: 5.2 MMOL/L (ref 3.5–5.1)
PROT SERPL-MCNC: 7.1 G/DL (ref 6.4–8.2)
RBC # BLD AUTO: 2.73 M/UL (ref 4.1–5.7)
RBC # BLD AUTO: 3.63 M/UL (ref 4.1–5.7)
RBC MORPH BLD: ABNORMAL
SERVICE CMNT-IMP: NORMAL
SODIUM SERPL-SCNC: 139 MMOL/L (ref 136–145)
SODIUM SERPL-SCNC: 139 MMOL/L (ref 136–145)
STRESS BASELINE DIAS BP: 72 MMHG
STRESS BASELINE HR: 62 BPM
STRESS BASELINE ST DEPRESSION: 0 MM
STRESS BASELINE SYS BP: 142 MMHG
STRESS O2 SAT PEAK: 97 %
STRESS O2 SAT REST: 98 %
STRESS PEAK DIAS BP: 54 MMHG
STRESS PEAK SYS BP: 108 MMHG
STRESS PERCENT HR ACHIEVED: 61 %
STRESS POST PEAK HR: 86 BPM
STRESS RATE PRESSURE PRODUCT: 9288 BPM*MMHG
STRESS ST DEPRESSION: 0 MM
STRESS TARGET HR: 140 BPM
T4 FREE SERPL-MCNC: 0.9 NG/DL (ref 0.8–1.5)
TRIGL SERPL-MCNC: 111 MG/DL (ref ?–150)
TSH SERPL DL<=0.05 MIU/L-ACNC: 3.69 UIU/ML (ref 0.36–3.74)
VLDLC SERPL CALC-MCNC: 22.2 MG/DL
WBC # BLD AUTO: 5.3 K/UL (ref 4.1–11.1)
WBC # BLD AUTO: 5.6 K/UL (ref 4.1–11.1)

## 2022-01-01 PROCEDURE — 93325 DOPPLER ECHO COLOR FLOW MAPG: CPT | Performed by: INTERNAL MEDICINE

## 2022-01-01 PROCEDURE — G8510 SCR DEP NEG, NO PLAN REQD: HCPCS | Performed by: FAMILY MEDICINE

## 2022-01-01 PROCEDURE — 99214 OFFICE O/P EST MOD 30 MIN: CPT | Performed by: INTERNAL MEDICINE

## 2022-01-01 PROCEDURE — G8752 SYS BP LESS 140: HCPCS | Performed by: INTERNAL MEDICINE

## 2022-01-01 PROCEDURE — G8417 CALC BMI ABV UP PARAM F/U: HCPCS | Performed by: INTERNAL MEDICINE

## 2022-01-01 PROCEDURE — C1893 INTRO/SHEATH, FIXED,NON-PEEL: HCPCS | Performed by: INTERNAL MEDICINE

## 2022-01-01 PROCEDURE — G8536 NO DOC ELDER MAL SCRN: HCPCS | Performed by: FAMILY MEDICINE

## 2022-01-01 PROCEDURE — 74011250636 HC RX REV CODE- 250/636: Performed by: INTERNAL MEDICINE

## 2022-01-01 PROCEDURE — 33208 INSRT HEART PM ATRIAL & VENT: CPT | Performed by: INTERNAL MEDICINE

## 2022-01-01 PROCEDURE — 1101F PT FALLS ASSESS-DOCD LE1/YR: CPT | Performed by: FAMILY MEDICINE

## 2022-01-01 PROCEDURE — G8754 DIAS BP LESS 90: HCPCS | Performed by: INTERNAL MEDICINE

## 2022-01-01 PROCEDURE — G8536 NO DOC ELDER MAL SCRN: HCPCS | Performed by: INTERNAL MEDICINE

## 2022-01-01 PROCEDURE — 71045 X-RAY EXAM CHEST 1 VIEW: CPT

## 2022-01-01 PROCEDURE — 99153 MOD SED SAME PHYS/QHP EA: CPT | Performed by: INTERNAL MEDICINE

## 2022-01-01 PROCEDURE — 99215 OFFICE O/P EST HI 40 MIN: CPT | Performed by: FAMILY MEDICINE

## 2022-01-01 PROCEDURE — 99215 OFFICE O/P EST HI 40 MIN: CPT | Performed by: INTERNAL MEDICINE

## 2022-01-01 PROCEDURE — 1101F PT FALLS ASSESS-DOCD LE1/YR: CPT | Performed by: INTERNAL MEDICINE

## 2022-01-01 PROCEDURE — G8427 DOCREV CUR MEDS BY ELIG CLIN: HCPCS | Performed by: FAMILY MEDICINE

## 2022-01-01 PROCEDURE — G8419 CALC BMI OUT NRM PARAM NOF/U: HCPCS | Performed by: INTERNAL MEDICINE

## 2022-01-01 PROCEDURE — G8510 SCR DEP NEG, NO PLAN REQD: HCPCS | Performed by: INTERNAL MEDICINE

## 2022-01-01 PROCEDURE — 99214 OFFICE O/P EST MOD 30 MIN: CPT | Performed by: FAMILY MEDICINE

## 2022-01-01 PROCEDURE — 74011250636 HC RX REV CODE- 250/636: Performed by: NURSE PRACTITIONER

## 2022-01-01 PROCEDURE — G8427 DOCREV CUR MEDS BY ELIG CLIN: HCPCS | Performed by: INTERNAL MEDICINE

## 2022-01-01 PROCEDURE — 99152 MOD SED SAME PHYS/QHP 5/>YRS: CPT | Performed by: INTERNAL MEDICINE

## 2022-01-01 PROCEDURE — 77030022704 HC SUT VLOC COVD -B: Performed by: INTERNAL MEDICINE

## 2022-01-01 PROCEDURE — 74011000250 HC RX REV CODE- 250: Performed by: NURSE PRACTITIONER

## 2022-01-01 PROCEDURE — A9500 TC99M SESTAMIBI: HCPCS | Performed by: INTERNAL MEDICINE

## 2022-01-01 PROCEDURE — 78452 HT MUSCLE IMAGE SPECT MULT: CPT | Performed by: INTERNAL MEDICINE

## 2022-01-01 PROCEDURE — C1898 LEAD, PMKR, OTHER THAN TRANS: HCPCS | Performed by: INTERNAL MEDICINE

## 2022-01-01 PROCEDURE — 93227 XTRNL ECG REC<48 HR R&I: CPT | Performed by: INTERNAL MEDICINE

## 2022-01-01 PROCEDURE — 93280 PM DEVICE PROGR EVAL DUAL: CPT | Performed by: INTERNAL MEDICINE

## 2022-01-01 PROCEDURE — 77030041075 HC DRSG AG OPTIFRM MDII -B: Performed by: INTERNAL MEDICINE

## 2022-01-01 PROCEDURE — 2709999900 HC NON-CHARGEABLE SUPPLY: Performed by: INTERNAL MEDICINE

## 2022-01-01 PROCEDURE — 93321 DOPPLER ECHO F-UP/LMTD STD: CPT | Performed by: INTERNAL MEDICINE

## 2022-01-01 PROCEDURE — 93294 REM INTERROG EVL PM/LDLS PM: CPT | Performed by: INTERNAL MEDICINE

## 2022-01-01 PROCEDURE — 77030032060 HC PWDR HEMSTAT ARISTA ASRB 3GM BARD -C: Performed by: INTERNAL MEDICINE

## 2022-01-01 PROCEDURE — 93015 CV STRESS TEST SUPVJ I&R: CPT | Performed by: INTERNAL MEDICINE

## 2022-01-01 PROCEDURE — C1785 PMKR, DUAL, RATE-RESP: HCPCS | Performed by: INTERNAL MEDICINE

## 2022-01-01 PROCEDURE — C1892 INTRO/SHEATH,FIXED,PEEL-AWAY: HCPCS | Performed by: INTERNAL MEDICINE

## 2022-01-01 PROCEDURE — 99497 ADVNCD CARE PLAN 30 MIN: CPT | Performed by: FAMILY MEDICINE

## 2022-01-01 PROCEDURE — C1781 MESH (IMPLANTABLE): HCPCS | Performed by: INTERNAL MEDICINE

## 2022-01-01 PROCEDURE — G0439 PPPS, SUBSEQ VISIT: HCPCS | Performed by: FAMILY MEDICINE

## 2022-01-01 PROCEDURE — 93308 TTE F-UP OR LMTD: CPT | Performed by: INTERNAL MEDICINE

## 2022-01-01 PROCEDURE — 1123F ACP DISCUSS/DSCN MKR DOCD: CPT | Performed by: INTERNAL MEDICINE

## 2022-01-01 PROCEDURE — G8419 CALC BMI OUT NRM PARAM NOF/U: HCPCS | Performed by: FAMILY MEDICINE

## 2022-01-01 PROCEDURE — G8752 SYS BP LESS 140: HCPCS | Performed by: FAMILY MEDICINE

## 2022-01-01 PROCEDURE — 74011000250 HC RX REV CODE- 250: Performed by: INTERNAL MEDICINE

## 2022-01-01 PROCEDURE — 93306 TTE W/DOPPLER COMPLETE: CPT | Performed by: SPECIALIST

## 2022-01-01 PROCEDURE — 77030018547 HC SUT ETHBND1 J&J -B: Performed by: INTERNAL MEDICINE

## 2022-01-01 PROCEDURE — 93296 REM INTERROG EVL PM/IDS: CPT | Performed by: INTERNAL MEDICINE

## 2022-01-01 PROCEDURE — G8754 DIAS BP LESS 90: HCPCS | Performed by: FAMILY MEDICINE

## 2022-01-01 PROCEDURE — G8753 SYS BP > OR = 140: HCPCS | Performed by: FAMILY MEDICINE

## 2022-01-01 PROCEDURE — 93225 XTRNL ECG REC<48 HRS REC: CPT | Performed by: INTERNAL MEDICINE

## 2022-01-01 PROCEDURE — 82962 GLUCOSE BLOOD TEST: CPT

## 2022-01-01 DEVICE — ENVELOPE CMRM6133 ABSORB LRG MR
Type: IMPLANTABLE DEVICE | Status: FUNCTIONAL
Brand: TYRX™

## 2022-01-01 DEVICE — LEAD 5076-52 MRI US RCMCRD
Type: IMPLANTABLE DEVICE | Status: FUNCTIONAL
Brand: CAPSUREFIX NOVUS MRI™ SURESCAN®

## 2022-01-01 DEVICE — IPG W1DR01 AZURE XT DR MRI USA
Type: IMPLANTABLE DEVICE | Status: FUNCTIONAL
Brand: AZURE™ XT DR MRI SURESCAN™

## 2022-01-01 DEVICE — LEAD 407458 MRI US BI MVC RCMRCD
Type: IMPLANTABLE DEVICE | Status: FUNCTIONAL
Brand: CAPSURE SENSE MRI™ SURESCAN™

## 2022-01-01 RX ORDER — CALCIUM CARB/VITAMIN D3/VIT K1 500-100-40
TABLET,CHEWABLE ORAL
Qty: 200 EACH | Refills: 2 | Status: SHIPPED | OUTPATIENT
Start: 2022-01-01

## 2022-01-01 RX ORDER — CHLORHEXIDINE GLUCONATE 4 G/100ML
SOLUTION TOPICAL
Qty: 1 EACH | Refills: 0 | Status: SHIPPED | OUTPATIENT
Start: 2022-01-01 | End: 2022-01-01

## 2022-01-01 RX ORDER — POTASSIUM CHLORIDE 1500 MG/1
TABLET, EXTENDED RELEASE ORAL
Qty: 90 TABLET | Refills: 3 | Status: SHIPPED | OUTPATIENT
Start: 2022-01-01 | End: 2022-01-01 | Stop reason: ALTCHOICE

## 2022-01-01 RX ORDER — IBUPROFEN 200 MG
TABLET ORAL
Qty: 28 PATCH | Refills: 1 | Status: SHIPPED | OUTPATIENT
Start: 2022-01-01 | End: 2022-01-01

## 2022-01-01 RX ORDER — LIDOCAINE HYDROCHLORIDE 10 MG/ML
INJECTION INFILTRATION; PERINEURAL AS NEEDED
Status: DISCONTINUED | OUTPATIENT
Start: 2022-01-01 | End: 2022-01-01 | Stop reason: HOSPADM

## 2022-01-01 RX ORDER — SODIUM CHLORIDE 0.9 % (FLUSH) 0.9 %
5-40 SYRINGE (ML) INJECTION AS NEEDED
Status: DISCONTINUED | OUTPATIENT
Start: 2022-01-01 | End: 2022-01-01 | Stop reason: HOSPADM

## 2022-01-01 RX ORDER — GABAPENTIN 300 MG/1
CAPSULE ORAL
Qty: 30 CAPSULE | Refills: 0 | Status: SHIPPED | OUTPATIENT
Start: 2022-01-01

## 2022-01-01 RX ORDER — PHENAZOPYRIDINE HYDROCHLORIDE 200 MG/1
200 TABLET, FILM COATED ORAL
Qty: 9 TABLET | Refills: 0 | Status: SHIPPED | OUTPATIENT
Start: 2022-01-01 | End: 2022-01-01

## 2022-01-01 RX ORDER — GABAPENTIN 300 MG/1
CAPSULE ORAL
Qty: 30 CAPSULE | Refills: 0 | Status: SHIPPED | OUTPATIENT
Start: 2022-01-01 | End: 2022-01-01

## 2022-01-01 RX ORDER — IBUPROFEN 200 MG
TABLET ORAL
Qty: 28 PATCH | Refills: 1 | Status: SHIPPED | OUTPATIENT
Start: 2022-01-01

## 2022-01-01 RX ORDER — ONDANSETRON 2 MG/ML
4 INJECTION INTRAMUSCULAR; INTRAVENOUS
Status: DISCONTINUED | OUTPATIENT
Start: 2022-01-01 | End: 2022-01-01 | Stop reason: HOSPADM

## 2022-01-01 RX ORDER — SODIUM CHLORIDE 0.9 % (FLUSH) 0.9 %
5-40 SYRINGE (ML) INJECTION EVERY 8 HOURS
Status: DISCONTINUED | OUTPATIENT
Start: 2022-01-01 | End: 2022-01-01 | Stop reason: HOSPADM

## 2022-01-01 RX ORDER — ACETAMINOPHEN 325 MG/1
650 TABLET ORAL
Status: DISCONTINUED | OUTPATIENT
Start: 2022-01-01 | End: 2022-01-01 | Stop reason: HOSPADM

## 2022-01-01 RX ORDER — BUDESONIDE AND FORMOTEROL FUMARATE DIHYDRATE 160; 4.5 UG/1; UG/1
1 AEROSOL RESPIRATORY (INHALATION) 2 TIMES DAILY
Qty: 10.2 G | Refills: 2 | Status: SHIPPED | OUTPATIENT
Start: 2022-01-01

## 2022-01-01 RX ORDER — CEPHALEXIN 500 MG/1
500 CAPSULE ORAL 2 TIMES DAILY
Qty: 20 CAPSULE | Refills: 0 | Status: SHIPPED | OUTPATIENT
Start: 2022-01-01 | End: 2022-01-01

## 2022-01-01 RX ORDER — TAMSULOSIN HYDROCHLORIDE 0.4 MG/1
CAPSULE ORAL
Qty: 90 CAPSULE | Refills: 1 | Status: SHIPPED | OUTPATIENT
Start: 2022-01-01

## 2022-01-01 RX ORDER — PREDNISONE 10 MG/1
TABLET ORAL
COMMUNITY
Start: 2022-01-01

## 2022-01-01 RX ORDER — TETRAKIS(2-METHOXYISOBUTYLISOCYANIDE)COPPER(I) TETRAFLUOROBORATE 1 MG/ML
10 INJECTION, POWDER, LYOPHILIZED, FOR SOLUTION INTRAVENOUS ONCE
Status: COMPLETED | OUTPATIENT
Start: 2022-01-01 | End: 2022-01-01

## 2022-01-01 RX ORDER — FUROSEMIDE 40 MG/1
40 TABLET ORAL DAILY
Qty: 90 TABLET | Refills: 0 | Status: SHIPPED | OUTPATIENT
Start: 2022-01-01 | End: 2022-01-01 | Stop reason: SDUPTHER

## 2022-01-01 RX ORDER — HUMAN INSULIN 100 [IU]/ML
INJECTION, SUSPENSION SUBCUTANEOUS
Qty: 10 ML | Refills: 1 | Status: SHIPPED | OUTPATIENT
Start: 2022-01-01

## 2022-01-01 RX ORDER — FUROSEMIDE 40 MG/1
TABLET ORAL
Qty: 90 TABLET | Refills: 0 | OUTPATIENT
Start: 2022-01-01

## 2022-01-01 RX ORDER — SODIUM CHLORIDE 0.9 % (FLUSH) 0.9 %
5-40 SYRINGE (ML) INJECTION AS NEEDED
Status: CANCELLED | OUTPATIENT
Start: 2022-01-01

## 2022-01-01 RX ORDER — NITROGLYCERIN 0.4 MG/1
0.4 TABLET SUBLINGUAL
Qty: 21 TABLET | Refills: 1 | Status: SHIPPED | OUTPATIENT
Start: 2022-01-01

## 2022-01-01 RX ORDER — PHENAZOPYRIDINE HYDROCHLORIDE 200 MG/1
TABLET, FILM COATED ORAL
Qty: 30 TABLET | Refills: 0 | Status: CANCELLED | OUTPATIENT
Start: 2022-01-01

## 2022-01-01 RX ORDER — DIPHENHYDRAMINE HYDROCHLORIDE 50 MG/ML
25 INJECTION, SOLUTION INTRAMUSCULAR; INTRAVENOUS
Status: CANCELLED | OUTPATIENT
Start: 2022-01-01 | End: 2022-01-01

## 2022-01-01 RX ORDER — TETRAKIS(2-METHOXYISOBUTYLISOCYANIDE)COPPER(I) TETRAFLUOROBORATE 1 MG/ML
30 INJECTION, POWDER, LYOPHILIZED, FOR SOLUTION INTRAVENOUS ONCE
Status: COMPLETED | OUTPATIENT
Start: 2022-01-01 | End: 2022-01-01

## 2022-01-01 RX ORDER — HYDROCODONE BITARTRATE AND ACETAMINOPHEN 5; 325 MG/1; MG/1
1 TABLET ORAL
Status: DISCONTINUED | OUTPATIENT
Start: 2022-01-01 | End: 2022-01-01 | Stop reason: HOSPADM

## 2022-01-01 RX ORDER — ALBUTEROL SULFATE 0.83 MG/ML
2.5 SOLUTION RESPIRATORY (INHALATION)
Qty: 30 NEBULE | Refills: 0 | Status: SHIPPED | OUTPATIENT
Start: 2022-01-01

## 2022-01-01 RX ORDER — BUMETANIDE 1 MG/1
1 TABLET ORAL 2 TIMES DAILY
Qty: 60 TABLET | Refills: 0 | Status: SHIPPED | OUTPATIENT
Start: 2022-01-01

## 2022-01-01 RX ORDER — MIDAZOLAM HYDROCHLORIDE 1 MG/ML
INJECTION, SOLUTION INTRAMUSCULAR; INTRAVENOUS AS NEEDED
Status: DISCONTINUED | OUTPATIENT
Start: 2022-01-01 | End: 2022-01-01 | Stop reason: HOSPADM

## 2022-01-01 RX ORDER — FUROSEMIDE 80 MG/1
80 TABLET ORAL DAILY
Qty: 90 TABLET | Refills: 2 | Status: SHIPPED | OUTPATIENT
Start: 2022-01-01 | End: 2022-01-01 | Stop reason: ALTCHOICE

## 2022-01-01 RX ORDER — HUMAN INSULIN 100 [IU]/ML
INJECTION, SUSPENSION SUBCUTANEOUS
Qty: 10 ML | Refills: 1 | Status: SHIPPED | OUTPATIENT
Start: 2022-01-01 | End: 2022-01-01

## 2022-01-01 RX ORDER — SODIUM CHLORIDE 0.9 % (FLUSH) 0.9 %
5-40 SYRINGE (ML) INJECTION EVERY 8 HOURS
Status: CANCELLED | OUTPATIENT
Start: 2022-01-01

## 2022-01-01 RX ORDER — NYSTATIN AND TRIAMCINOLONE ACETONIDE 100000; 1 [USP'U]/G; MG/G
OINTMENT TOPICAL 2 TIMES DAILY
Qty: 30 G | Refills: 1 | Status: SHIPPED | OUTPATIENT
Start: 2022-01-01

## 2022-01-01 RX ORDER — PEN NEEDLE, DIABETIC 29 G X1/2"
NEEDLE, DISPOSABLE MISCELLANEOUS
Qty: 60 EACH | Refills: 5 | Status: SHIPPED | OUTPATIENT
Start: 2022-01-01

## 2022-01-01 RX ORDER — SPIRONOLACTONE 25 MG/1
25 TABLET ORAL DAILY
COMMUNITY
Start: 2022-01-01

## 2022-01-01 RX ORDER — METOPROLOL SUCCINATE 25 MG/1
TABLET, EXTENDED RELEASE ORAL
COMMUNITY
Start: 2022-01-01

## 2022-01-01 RX ORDER — CEPHALEXIN 500 MG/1
500 CAPSULE ORAL 4 TIMES DAILY
Qty: 40 CAPSULE | Refills: 0 | Status: SHIPPED | OUTPATIENT
Start: 2022-01-01 | End: 2022-01-01 | Stop reason: DRUGHIGH

## 2022-01-01 RX ORDER — BUMETANIDE 1 MG/1
1 TABLET ORAL DAILY
COMMUNITY
Start: 2022-01-01 | End: 2022-01-01 | Stop reason: SDUPTHER

## 2022-01-01 RX ADMIN — TETRAKIS(2-METHOXYISOBUTYLISOCYANIDE)COPPER(I) TETRAFLUOROBORATE 26.4 MILLICURIE: 1 INJECTION, POWDER, LYOPHILIZED, FOR SOLUTION INTRAVENOUS at 10:55

## 2022-01-01 RX ADMIN — TETRAKIS(2-METHOXYISOBUTYLISOCYANIDE)COPPER(I) TETRAFLUOROBORATE 8.8 MILLICURIE: 1 INJECTION, POWDER, LYOPHILIZED, FOR SOLUTION INTRAVENOUS at 10:00

## 2022-03-03 NOTE — PROGRESS NOTES
[facility] [place]: Jose Manuel Carpenter  [phone]    History of Present Illness:   Mr. Aleksander Fields is [de-identified] yo M with mild to moderate aortic stenosis, RBBB, COPD, HTN. Echo 2018 with normal LV function with moderate aortic stenosis, mean gradient of 24 mmHg. This is slightly increased from 17 mmHg in 2016. H/o bladder cancer and follows Dr Kee Osgood s/p MAURICE. Followed by hematology for chronic thrombocytopenia. The last few weeks, he has had severe episodes where just going to the mailbox he will feel short of breath and \"flushed\". On occasions, he has been having severe episodes of dizziness. When he has checked his heart rate, he has noted it to be low in the 30s at times. He denies any chest pain with any of these spells. He has been compliant with his medications. He is compensated on exam with clear lungs. He does have a IV/VI systolic murmur at the left sternal border. His blood pressure was 130/70 with a heart rate of 79. Assessment and Plan:   1. Unstable angina. Shortness of breath with exertion, possible anginal equivalent and multiple risk factors. I do think there is high concern for either ischemia or sick sinus with this presentation. Will try to obtain a stress test as soon as possible to evaluate for possible ischemia. Will also try to obtain an echocardiogram given his history of aortic stenosis as soon as possible. 2. Dizziness. Will have him wear a 24 hour Holter monitor to evaluate for possible sick sinus or arrhythmia. 3. Moderate aortic stenosis. His gradient in the past was 32 on his last echocardiogram.   4. Essential hypertension. Blood pressure is controlled. 5. Tobacco use. Encouraged continued cessation. 6. Low platelet count. Followed by Dr. Gerardo Brian. He  has a past medical history of COPD (chronic obstructive pulmonary disease) ([facility] Diabetes ([facility] Enlarged prostate, Moderate aortic stenosis, and Pneumonia. All other systems negative except as above. PE  Vitals:    03/03/22 0957   BP: 130/70   Pulse: 79   Resp: 13   SpO2: 97%   Weight: 196 lb 6.4 oz (89.1 kg)   Height: 5' 11\" (1.803 m)    Body mass index is 27.39 kg/m².    General appearance - alert, well appearing, and in no distress  Mental status - affect appropriate to mood  Eyes - sclera anicteric, moist mucous membranes  Neck - supple, no JVD  Chest - clear to auscultation, no wheezes, rales or rhonchi  Heart - normal rate, regular rhythm, normal S1, H0,ML/RK systolic murmur LUSB  Abdomen - soft, nontender, nondistended, no masses or organomegaly  Extremities - peripheral pulses normal, no pedal edema    Recent Labs:  Lab Results   Component Value Date/Time    Cholesterol, total 159 11/18/2021 12:50 PM    HDL Cholesterol 37 11/18/2021 12:50 PM    LDL, calculated 92.8 11/18/2021 12:50 PM    Triglyceride 146 11/18/2021 12:50 PM    CHOL/HDL Ratio 4.3 11/18/2021 12:50 PM     Lab Results   Component Value Date/Time    Creatinine 1.12 11/18/2021 12:50 PM     Lab Results   Component Value Date/Time    BUN 21 (H) 11/18/2021 12:50 PM     Lab Results   Component Value Date/Time    Potassium 4.5 11/18/2021 12:50 PM     Lab Results   Component Value Date/Time    Hemoglobin A1c 5.3 11/18/2021 12:50 PM     Lab Results   Component Value Date/Time    HGB 11.3 (L) 11/18/2021 12:50 PM     Lab Results   Component Value Date/Time    PLATELET 87 (L) 45/23/8071 12:50 PM       Reviewed:  Past Medical History:   Diagnosis Date    [facility]ch[facility]) ([facility]     Diabetes ([facility]     Enlarged prostate     Moderate aortic stenosis     echo June 2016 Diony     Pneumonia      Social History     Tobacco Use   Smoking Status Current Every Day Smoker    Packs/day: 0.50   Smokeless Tobacco Never Used   Tobacco Comment    1 pack a day  ( 16-18 ) a day      Social History     Substance and Sexual Activity   Alcohol Use Yes    Alcohol/week: 14.0 standard drinks    Types: 14 Cans of beer per week Comment: Social      No Known Allergies    Current Outpatient Medications   Medication Sig    nicotine (NICODERM CQ) 21 mg/24 hr APPLY 1 PATCH TO SKIN ONCE EVERY 24 HOURS    gabapentin (NEURONTIN) 300 mg capsule TAKE 1 CAPSULE BY MOUTH EVERY DAY AT NIGHT    furosemide (LASIX) 40 mg tablet TAKE 1 TABLET BY MOUTH EVERY DAY    tamsulosin (FLOMAX) 0.4 mg capsule TAKE 1 CAPSULE BY MOUTH DAILY    insulin NPH/insulin regular (NovoLIN 70/30 U-100 Insulin) 100 unit/mL (70-30) injection 6 Units by SubCUTAneous route Before breakfast and dinner.  BD Insulin Syringe Ultra-Fine 0.5 mL 30 gauge x 1/2\" syrg USE TWICE DAILY TO INJECT INSULIN    potassium chloride (K-DUR, KLOR-CON) 20 mEq tablet Take 1 Tab by mouth daily.  Insulin Syringe-Needle U-100 (BD Insulin Syringe Ultra-Fine) 0.3 mL 31 gauge x 5/16\" syrg Use twice daily to inject insulin.  metFORMIN ER (GLUCOPHAGE XR) 500 mg tablet Take 1 Tab by mouth daily (with dinner).  glucose blood VI test strips (LIBRADO BLOOD GLUCOSE TEST STRIP) strip USE TO TEST TWICE DAILY    multivit with minerals/lutein (MULTIVITAMIN 50 PLUS PO) Take  by mouth.  albuterol (PROAIR HFA) 90 mcg/actuation inhaler Take 1 Puff by inhalation.  mometasone-formoterol (DULERA) 100-5 mcg/actuation HFA inhaler INL 2 PFS PO BID    ONETOUCH ULTRA TEST strip USE TO TEST TWICE DAILY     No current facility-administered medications for this visit.        Loida Mitchell MD  [address] [facility] heart and Vascular [place]  [address] [facility] [address]  [address]

## 2022-03-03 NOTE — PATIENT INSTRUCTIONS
You will be scheduled for a Nuclear Stress Test after your appointment today. Nuclear stress testing evaluates blood flow to your heart muscle and assesses cardiac function. There are 2 parts (Rest/Stress) to this procedure and will include either an exercise on a treadmill or an IV administration of a stressing medication called Lexiscan. Your cardiologist will determine which type of testing is best for you. This test can be performed in one day unless it is determined that better quality images will be obtained by performing the test over two days. *Please arrive 15 minutes prior to your appointment time    Test Duration:    -One day testing will take 4 hours   -Two day testing will take 2 hours each day. Your second day(resting images) will be scheduled after the first day is completed    Day of testing instructions:    1. NO CAFFEINE (not even decaffeinated products) 24 HOURS PRIOR TO TESTING. This includes coffee, soda, tea, chocolate, multivitamins, and migraine medication, like Excedrin or Fioricet that contains caffeine. 2. Nothing to eat or drink 4 HOURS prior to testing  3. NO NICOTINE 12 hours prior to testing  4. Hold any medications requested by your cardiologist. Otherwise take medications as directed with a few sips of water. If you are unsure you may bring your medications with you to take after instructed by your stressing nurse. It is recommended you hold no medications prior to your test. DIABETIC PATIENTS: Take half of your insulin with a light meal 4 hours before your test.  5. Wear comfortable clothes and shoes (Shirts with no metal, shorts or pants, tennis shoes, no heels or flip flops)    IMPORTANT: This testing involves a cardiac tracer ordered specifically for you. If you are unable to make your appointment, please call to cancel/reschedule AT LEAST 24 hours prior to your appointment so your tracer can be cancelled. [phone].

## 2022-03-10 NOTE — TELEPHONE ENCOUNTER
----- Message from Lorrie Fuentes MD sent at 3/9/2022  2:35 PM EST -----  Please let pt know stress test was normal. Keep scheduled follow up.  thx

## 2022-03-15 NOTE — PROGRESS NOTES
Intermittent third degree av block on holter    Future Appointments   Date Time Provider Cny Heath   3/22/2022 11:20 AM MD JENNY Andersen BS AMB   4/6/2022 11:00 AM VASCULAR, JEREMIE PATEL BS AMB   6/13/2022  1:00 PM JEREMIE FENTON AMB   6/13/2022  1:40 PM MD AMANDA Diallo BS AMB

## 2022-03-15 NOTE — TELEPHONE ENCOUNTER
Runell Sever, MD  You 5 hours ago (9:37 AM)     SW    Please let pt know holter was abnormal, recommend appt with Dr. Eloy Kumar to discuss results. HR went very slow at times.  thx

## 2022-03-22 PROBLEM — Z91.81 AT HIGH RISK FOR FALLS: Status: ACTIVE | Noted: 2022-01-01

## 2022-03-22 PROBLEM — R00.1 SYMPTOMATIC SINUS BRADYCARDIA: Status: ACTIVE | Noted: 2022-01-01

## 2022-03-22 NOTE — PROGRESS NOTES
Chief Complaint   Patient presents with    Complete Physical     follow up     Dizziness     x 1 - 2 months    Nasal Congestion     runny nose on/off       1. \"Have you been to the ER, urgent care clinic since your last visit? Hospitalized since your last visit? \" No    2. \"Have you seen or consulted any other health care providers outside of the [address] [place] [address] [place] since your last visit? \" Yes When: 2-3 weeks ago Where: cardiologist  Reason for visit: follow up     3. For patients aged 39-70: Has the patient had a colonoscopy / FIT/ Cologuard? NA - based on age      If the patient is female:    4. For patients aged 41-77: Has the patient had a mammogram within the past 2 years? NA - based on age or sex      11. For patients aged 21-65: Has the patient had a pap smear? NA - based on age or sex         3 most recent PHQ Screens 3/22/2022   PHQ Not Done -   Little interest or pleasure in doing things Not at all   Feeling down, depressed, irritable, or hopeless Not at all   Total Score PHQ 2 0       Abuse Screening Questionnaire 3/22/2022   Do you ever feel afraid of your partner? N   Are you in a relationship with someone who physically or mentally threatens you? N   Is it safe for you to go home?  Y       ADL Assessment 3/22/2022   Feeding yourself No Help Needed   Getting from bed to chair No Help Needed   Getting dressed No Help Needed   Bathing or showering No Help Needed   Walk across the room (includes cane/walker) No Help Needed   Using the telphone No Help Needed   Taking your medications No Help Needed   Preparing meals No Help Needed   Managing money (expenses/bills) No Help Needed   Moderately strenuous housework (laundry) No Help Needed   Shopping for personal items (toiletries/medicines) No Help Needed   Shopping for groceries No Help Needed   Driving No Help Needed   Climbing a flight of stairs No Help Needed   Getting to places beyond walking distances No Help Needed       Fall Risk Assessment, last 12 mths 3/22/2022   Able to walk? Yes   Fall in past 12 months? 1   Do you feel unsteady? 1   Are you worried about falling 1   Is TUG test greater than 12 seconds? 0   Is the gait abnormal? 1   Number of falls in past 12 months 2   Fall with injury?  1         Health Maintenance Due   Topic Date Due    Shingrix Vaccine Age 49> (1 of 2) Never done    Medicare Yearly Exam  03/18/2022

## 2022-03-22 NOTE — ASSESSMENT & PLAN NOTE
monitored by specialist. No acute findings meriting change in the plan   Follows Dr. Lei Grossman. Summaries from his office reviewed.

## 2022-03-22 NOTE — PROGRESS NOTES
This is the Subsequent Medicare Annual Wellness Exam, performed 12 months or more after the Initial AWV or the last Subsequent AWV    I have reviewed the patient's medical history in detail and updated the computerized patient record. We did a Medicare Wellness evaluation including a review of past, family, and social histories with attention to age/sex appropriate screening, risk assessment, preventive care and counseling. Any cognitive, fall risk, or ADL issues identified are addressed separately. A patient specific preventive care plan was provided and appropriate screening tests were ordered as specified. Assessment/Plan   Education and counseling provided:  Are appropriate based on today's review and evaluation    1. Medicare annual wellness visit, subsequent  2. Advanced directives, counseling/discussion  -     ADVANCE CARE PLANNING FIRST 30 MINS  -     REFERRAL TO ACP CLINICAL SPECIALIST  3. Screening for depression  -     DEPRESSION SCREEN ANNUAL  4. Controlled type 2 diabetes mellitus with diabetic autonomic neuropathy, with long-term current use of insulin ([facility]  Assessment & Plan:   well controlled, continue current medications, continue current treatment plan, medication adherence emphasized, lifestyle modifications recommended  Orders:  -     HEMOGLOBIN A1C WITH EAG; Future  -     METABOLIC PANEL, COMPREHENSIVE; Future  -     LIPID PANEL; Future  5. Pulmonary emphysema, unspecified emphysema type ([facility]  Assessment & Plan:   borderline controlled, continue current medications, continue current treatment plan, medication adherence emphasized, lifestyle modifications recommended strongly recommended to use inhaler more often. He has stopped smoking  Orders:  -     CBC WITH AUTOMATED DIFF; Future  6. Thrombocytopenia ([facility]  Assessment & Plan:   monitored by specialist. No acute findings meriting change in the plan   Follows Dr. Ibeth Parkinson. Summaries from his office reviewed.   Orders:  -     CBC WITH AUTOMATED DIFF; Future  7. Type 2 diabetes with nephropathy ([facility]  Assessment & Plan:   well controlled, continue current medications, continue current treatment plan, medication adherence emphasized, lifestyle modifications recommended  Orders:  -     HEMOGLOBIN A1C WITH EAG; Future  8. Nonrheumatic aortic valve stenosis  9. Benign prostatic hyperplasia with urinary frequency  10. Symptomatic sinus bradycardia  -     TSH 3RD GENERATION; Future  -     T4, FREE; Future  11. At high risk for falls       Depression Risk Factor Screening     3 most recent PHQ Screens 3/22/2022   PHQ Not Done -   Little interest or pleasure in doing things Not at all   Feeling down, depressed, irritable, or hopeless Not at all   Total Score PHQ 2 0   We spent 10 minutes on importance of depression screening . Depression screening is a way to see if you have depression symptoms. It may be done by a doctor or counselor. It's often part of a routine checkup. That's because your mental health is just as important as your physical health. Depression is a medical illness that affects how you feel, think, and act. You may:  · Have less energy. · Lose interest in your daily activities. · Feel sad and grouchy for a long time. Depression is very common. It affects men and women of all ages. Many things can trigger depression. Some people become depressed after they have a stroke or find out they have a major illness like cancer or heart disease. The death of a loved one, a breakup, or changes in the natural brain chemicals may lead to depression. It can run in families. Most experts believe that a combination of inherited genes and stressful life events can cause it.       Alcohol & Drug Abuse Risk Screen    Do you average more than 1 drink per night or more than 7 drinks a week: No    In the past three months have you have had more than 4 drinks containing alcohol on one occasion: No          Functional Ability and Level of Safety Hearing: Hearing is good. Activities of Daily Living: The home contains: handrails and grab bars  Patient does total self care      Ambulation: with difficulty, uses a cane     Fall Risk:  Fall Risk Assessment, last 12 mths 3/22/2022   Able to walk? Yes   Fall in past 12 months? 1   Do you feel unsteady? 1   Are you worried about falling 1   Is TUG test greater than 12 seconds? 0   Is the gait abnormal? 1   Number of falls in past 12 months 2   Fall with injury?  1      Abuse Screen:  Patient is not abused       Cognitive Screening    Has your family/caregiver stated any concerns about your memory: no     Cognitive Screening: Normal - MMSE (Mini Mental Status Exam)  Alert and oriented x 5 to time,place and person    Health Maintenance Due     Health Maintenance Due   Topic Date Due    Shingrix Vaccine Age 49> (1 of 2) Never done       Patient Care Team   Patient Care Team:  Eloy Zimmerman MD as PCP - General (Family Medicine)  Eloy Zimmerman MD as PCP - [address] [place] [address] Provider  Sarina Joyce MD (Hematology and Oncology)  Brice Last MD (Internal Medicine)  Lynette Lopez MD (Cardiology)  Mary Gonzalez MD (Urology)  Bridgette Grace MD (Orthopedic Surgery)    History     Patient Active Problem List   Diagnosis Code    Edema R60.9    Pulmonary emphysema ([address] J43.9    Benign prostatic hyperplasia N40.0    Thrombocytopenia ([address] D69.6    Tobacco abuse Z72.0    Nonrheumatic aortic valve stenosis I35.0    Arthritis of right hip M16.11    History of bladder cancer Z85.51    Benign essential HTN I10    Prostate nodule N40.2    Over weight E66.3    Type 2 diabetes with nephropathy ([address] E11.21    Controlled type 2 diabetes mellitus with diabetic autonomic neuropathy, with long-term current use of insulin (HCC) E11.43, Z79.4    Symptomatic sinus bradycardia R00.1    At high risk for falls Z91.81     Past Medical History:   Diagnosis Date    COPD (chronic obstructive pulmonary disease) [facility]     Diabetes ([facility]     Enlarged prostate     Moderate aortic stenosis     echo June 2016 [place]     Pneumonia       Past Surgical History:   Procedure Laterality Date    HX GI      cholecystectomy    HX RETINAL DETACHMENT REPAIR      HX UROLOGICAL      bladder surgery    ND ABDOMEN SURGERY PROC UNLISTED      hernia repair     Current Outpatient Medications   Medication Sig Dispense Refill    BD Insulin Syringe Ultra-Fine 0.5 mL 30 gauge x 1/2\" syrg USE AS DIRECTED TWICE A DAY TO INJECT INSULIN 60 Each 5    NovoLIN 70/30 U-100 Insulin 100 unit/mL (70-30) injection INJECT 6 UNITS UNDER THE SKIN BEFORE BREAFAST AND DINNER 10 mL 1    nicotine (NICODERM CQ) 21 mg/24 hr APPLY 1 PATCH TO SKIN ONCE EVERY 24 HOURS 28 Patch 1    gabapentin (NEURONTIN) 300 mg capsule TAKE 1 CAPSULE BY MOUTH EVERY DAY AT NIGHT 30 Capsule 0    furosemide (LASIX) 40 mg tablet TAKE 1 TABLET BY MOUTH EVERY DAY 90 Tablet 0    tamsulosin (FLOMAX) 0.4 mg capsule TAKE 1 CAPSULE BY MOUTH DAILY 90 Capsule 1    potassium chloride (K-DUR, KLOR-CON) 20 mEq tablet Take 1 Tab by mouth daily. 90 Tab 3    Insulin Syringe-Needle U-100 (BD Insulin Syringe Ultra-Fine) 0.3 mL 31 gauge x 5/16\" syrg Use twice daily to inject insulin. 200 Syringe 1    metFORMIN ER (GLUCOPHAGE XR) 500 mg tablet Take 1 Tab by mouth daily (with dinner). 90 Tab 1    glucose blood VI test strips (LIBRADO BLOOD GLUCOSE TEST STRIP) strip USE TO TEST TWICE DAILY      multivit with minerals/lutein (MULTIVITAMIN 50 PLUS PO) Take  by mouth.  mometasone-formoterol (DULERA) 100-5 mcg/actuation HFA inhaler INL 2 PFS PO BID      ONETOUCH ULTRA TEST strip USE TO TEST TWICE DAILY 100 Strip 0    albuterol (PROAIR HFA) 90 mcg/actuation inhaler Take 1 Puff by inhalation.  (Patient not taking: Reported on 3/22/2022)       No Known Allergies    Family History   Problem Relation Age of Onset    Diabetes Mother     Diabetes Father         had angina    Diabetes Paternal Grandmother      Social History     Tobacco Use    Smoking status: Former Smoker     Packs/day: 0.50     Quit date: 7/10/2021     Years since quittin.6    Smokeless tobacco: Never Used    Tobacco comment: 1 pack a day  ( 16-18 ) a day    Substance Use Topics    Alcohol use:  Yes     Alcohol/week: 14.0 standard drinks     Types: 14 Cans of beer per week     Comment: Mikaela Zavala MD

## 2022-03-22 NOTE — ACP (ADVANCE CARE PLANNING)
Advance Care Planning     Advance Care Planning (ACP) Physician/NP/PA Conversation      Date of Conversation: 3/22/2022  Conducted with: Patient with [address] Decision Maker:     Primary Decision Maker: Jon Dupree - Child - [phone]    Secondary Decision Maker: James Barrios - Daughter - [phone]    Secondary Decision Maker: Jj Hutchins - Spouse - [phone]  Click here to complete [address] including selection of the Healthcare Decision Maker Relationship (ie \"Primary\")        Today we documented Decision Maker(s) consistent with Legal Next of Kin hierarchy. Care Preferences:    Hospitalization: \"If your health worsens and it becomes clear that your chance of recovery is unlikely, what would be your preference regarding hospitalization? \"  The patient would prefer hospitalization. Ventilation: \"If you were unable to breathe on your own and your chance of recovery was unlikely, what would be your preference about the use of a ventilator (breathing machine) if it was available to you? \"   The patient would NOT desire the use of a ventilator. Resuscitation: \"In the event your heart stopped as a result of an underlying serious health condition, would you want attempts to be made to restart your heart, or would you prefer a natural death? \"   No, do NOT attempt to resuscitate. Additional topics discussed: treatment goals, benefit/burden of treatment options, artificial nutrition, ventilation preferences, hospitalization preferences, resuscitation preferences, end of life care preferences (vegetative state/imminent death) and hospice care    Conversation Outcomes / Follow-Up Plan:   ACP in process - information provided, considering goals and options  Patient does not want to be on life-prolonging measures.   Strongly recommended to complete directive to document his preferences and wish  Reviewed DNR/DNI and patient elects DNR order - referred to ACP Clinical Specialist & placed order     Length of Voluntary ACP Conversation in minutes:  16 minutes    Carissa Moya MD

## 2022-03-22 NOTE — PROGRESS NOTES
HISTORY OF PRESENT ILLNESS  Llana Gowers is a [de-identified] y.o. male. Patient was scheduled for Medicare wellness visit and follow-up on routine medical conditions but patient was found to have concerning vitals as well as symptoms of dizzy spells. He follows hematology for thrombocytopenia, cardiology for aortic stenosis and bradycardia and etiology for history of bladder cancer. Patient was recently followed by cardiology getting worsening shortness of breath. He has history of severe aortic stenosis. Patient with frequent dizzy spells and bradycardia. He had a Holter monitor that showed significant events of bradycardia and possible third-degree AV block. He is referred to Dr Long Greco for further evaluation and management. Today patient informed me that he had 2 falls, in both occasions he felt lightheaded and lost balance. Endocrine Review  He is seen for diabetes.  Since last visit he reports: no polyuria or polydipsia, no chest pain, dyspnea or TIA's, no unusual visual symptoms, no hypoglycemia, no medication side effects noted, has noticed numbness both feet.  Home glucose monitoring: is performed regularly, fasting values range 140-200  He is checking his sugars 2 per day.  He reports medication compliance: compliant all of the time.  Medication side effects: none.  Diabetic diet compliance: compliant all of the time.  Lab review: labs reviewed and discussed with patient.  Eye exam: overdue.  Referral done  His sliding scale Insulin was changed to NPH 6 units bid on last visit  Follows KAROLINE for his eye check up. Is advised to get cataract surgery, but he doesn't want to       COPD  Patient complains of cough, fatigue and shortness of breath. Symptoms began several years ago. Symptoms chronic dyspnea: severity = mild: course of sx: well controlled  becomes dyspneic after 5 blocks  symptoms worse with exertion. does worsen with exertion.  Sputum is clear in small amounts.  Patient currently is not on home oxygen therapy. Angela Campos Respiratory history: pneumonia, COPD and history of 20  pack years tobacco use  Follows Dr Bola Faulkner. PFT not c/w obstructive disease. He was advised to continue on Dulera and prn Albuterol. in spite of counseling, he is still smoking. He has not followed up with Pulmonology for a long period   As per him he takes Dulera that I prescribed before and that Only taking as needed.           H/o ITP. As per Dr Claribel Bentley, have discussed platelet transfusion in past,in denial        Taking Furosemide along with Potassium supplement for chronic leg swellings      Review of Systems   Constitutional: Negative for chills, fever and malaise/fatigue. HENT: Negative for congestion, ear pain, sore throat and tinnitus. Eyes: Negative for blurred vision, double vision, pain and discharge. Respiratory: Negative for cough, shortness of breath and wheezing. Cardiovascular: Negative for chest pain, palpitations and leg swelling. Gastrointestinal: Negative for abdominal pain, blood in stool, constipation, diarrhea, nausea and vomiting. Genitourinary: Negative for dysuria, frequency, hematuria and urgency. Musculoskeletal: Negative for back pain, joint pain and myalgias. Skin: Negative for rash. Neurological: Positive for dizziness. Negative for tremors, seizures and headaches. Endo/Heme/Allergies: Negative for polydipsia. Does not bruise/bleed easily. Psychiatric/Behavioral: Negative for depression and substance abuse. The patient is not nervous/anxious. Physical Exam  Vitals and nursing note reviewed. Constitutional:       Appearance: He is well-developed. He is not diaphoretic. HENT:      Head: Normocephalic and atraumatic. Right Ear: External ear normal.      Mouth/Throat:      Pharynx: No oropharyngeal exudate. Eyes:      General: No scleral icterus. Conjunctiva/sclera: Conjunctivae normal.      Pupils: Pupils are equal, round, and reactive to light.    Neck: Thyroid: No thyromegaly. Vascular: No JVD. Cardiovascular:      Rate and Rhythm: Regular rhythm. Bradycardia present. Heart sounds: Normal heart sounds. No murmur heard. Comments: Holosystolic grade 3 murmur at aortic area  Pulmonary:      Effort: Pulmonary effort is normal.      Breath sounds: Normal breath sounds. No wheezing. Abdominal:      General: Bowel sounds are normal. There is no distension. Palpations: Abdomen is soft. There is no mass. Musculoskeletal:         General: No tenderness. Normal range of motion. Cervical back: Normal range of motion and neck supple. Lymphadenopathy:      Cervical: No cervical adenopathy. Skin:     General: Skin is warm and dry. Findings: No rash. Neurological:      Mental Status: He is alert and oriented to person, place, and time. Cranial Nerves: No cranial nerve deficit. Deep Tendon Reflexes: Reflexes are normal and symmetric. Reflexes normal.         ASSESSMENT and PLAN  Diagnoses and all orders for this visit:    1. Medicare annual wellness visit, subsequent    2. Advanced directives, counseling/discussion  -     ADVANCE CARE PLANNING FIRST 30 MINS  -     REFERRAL TO ACP CLINICAL SPECIALIST    3. Screening for depression  -     DEPRESSION SCREEN ANNUAL    4. Controlled type 2 diabetes mellitus with diabetic autonomic neuropathy, with long-term current use of insulin ([facility]  Assessment & Plan:   well controlled, continue current medications, continue current treatment plan, medication adherence emphasized, lifestyle modifications recommended    Orders:  -     HEMOGLOBIN A1C WITH EAG; Future  -     METABOLIC PANEL, COMPREHENSIVE; Future  -     LIPID PANEL; Future    5.  Pulmonary emphysema, unspecified emphysema type ([facility]  Assessment & Plan:   borderline controlled, continue current medications, continue current treatment plan, medication adherence emphasized, lifestyle modifications recommended strongly recommended to use inhaler more often. He has stopped smoking    Orders:  -     CBC WITH AUTOMATED DIFF; Future    6. Thrombocytopenia ([address]  Assessment & Plan:   monitored by specialist. No acute findings meriting change in the plan   Follows Dr. Radha Flores. Summaries from his office reviewed. Orders:  -     CBC WITH AUTOMATED DIFF; Future    7. Type 2 diabetes with nephropathy ([address]  Assessment & Plan:   well controlled, continue current medications, continue current treatment plan, medication adherence emphasized, lifestyle modifications recommended    Orders:  -     HEMOGLOBIN A1C WITH EAG; Future    8. Nonrheumatic aortic valve stenosis    9. Benign prostatic hyperplasia with urinary frequency    10. Symptomatic sinus bradycardia  -     TSH 3RD GENERATION; Future  -     T4, FREE; Future    11. At high risk for falls  HOW TO PREVENT FALLS   Wear shoes with rubber soles that do not have an opening for your toes.  Keep the inside and outside of your house well lit.  Use night lights throughout your home.  Remove clutter from floors.  Clean up floor spills.  Remove throw rugs or fasten them to the floor with carpet tape.  Do not place electrical cords across pathways.  Put grab bars by your tub, shower, and toilet. Do not use towel bars as grab bars.  Put handrails on both sides of the stairway. Fix loose handrails.  Do not climb on stools or stepladders, if possible.  Do not wax your floors.  Repair uneven or unsafe sidewalks, walkways, or stairs.  Keep items you use a lot within reach.  Be aware of pets.  Keep emergency numbers next to the telephone.  Put smoke detectors in your home and near bedrooms. Ask your doctor what other things you can do to prevent falls. We had a long discussion on fall prevention. Will recommend to see cardiology earlier.     Discussed lifestyle issues and health guidance given  Patient was given an after visit summary which includes diagnoses, vital signs, current medications, instructions and references & authorized prescriptions . Results of labs will be conveyed to patient, once available. Pt verbalized instructions I provided and expressed understanding of discussion that was held today. Follow-up and Dispositions    · Return in about 4 months (around 7/22/2022) for follow up, fasting. Please note that this dictation was completed with [employer], the computer voice recognition software. Quite often unanticipated grammatical, syntax, homophones, and other interpretive errors are inadvertently transcribed by the computer software. Please disregard these errors. Please excuse any errors that have escaped final proofreading. Thank you.

## 2022-03-22 NOTE — PATIENT INSTRUCTIONS
Learning About a Pacemaker  What is a pacemaker? A pacemaker is a small device. It sends out mild electrical signals that keep your heart beating normally. The signals are painless. It can help stop the dizziness, fainting, and shortness of breath caused by a slow or unsteady heartbeat. A pacemaker is powered by batteries. Most pacemakers are placed under the skin of your chest. They have thin wires, called leads. The leads pass through a vein into your heart. A pacemaker can help restore a normal heart rate. It is used when certain problems have damaged the heart's electrical system, which normally keeps your heart beating steadily. You may feel worried about having a pacemaker. This is common. It can help if you learn about how the pacemaker helps your heart. Talk to your doctor about your concerns. How is a pacemaker put in place? You will get medicine before the procedure. It helps you relax and helps prevent pain. The doctor makes a cut in the skin just below your collarbone. The cut may be on either side of your chest. The doctor will put the pacemaker leads through the cut. The leads go into a large blood vessel in the upper chest. Then the doctor will guide the leads through the blood vessel into the heart. The leads are placed in one or two of the chambers in the heart. The doctor will place the pacemaker under the skin of your chest. The doctor will attach the leads to the pacemaker. Then the cut will be closed with stitches. What can you expect when you have a pacemaker? A pacemaker can help you return to a more normal, more active life. You'll need to use certain electric devices with caution. Some devices have a strong electromagnetic field. This field can keep your pacemaker from working right for a short time. These devices include things in your home, garage, or workplace.  Check with your doctor about what you need to avoid and what you need to keep a short distance away from your pacemaker. Many household and office electronics do not affect your pacemaker. Your doctor will check your pacemaker regularly to make sure it is working right. Pacemaker batteries usually last 5 to 15 years before they need to be replaced. Follow-up care is a key part of your treatment and safety. Be sure to make and go to all appointments, and call your doctor if you are having problems. It's also a good idea to know your test results and keep a list of the medicines you take. Where can you learn more? Go to http://www.gray.com/  Enter D396 in the search box to learn more about \"Learning About a Pacemaker. \"  Current as of: January 10, 2022               Content Version: 13.2  © 2006-2022 [facility]. Care instructions adapted under license by [employer] (which disclaims liability or warranty for this information). If you have questions about a medical condition or this instruction, always ask your healthcare professional. [place] [address] any warranty or liability for your use of this information. Medicare Wellness Visit, Male    The best way to live healthy is to have a lifestyle where you eat a well-balanced diet, exercise regularly, limit alcohol use, and quit all forms of tobacco/nicotine, if applicable. Regular preventive services are another way to keep healthy. Preventive services (vaccines, screening tests, monitoring & exams) can help personalize your care plan, which helps you manage your own care. Screening tests can find health problems at the earliest stages, when they are easiest to treat. [facility] follows the current, evidence-based guidelines published by the [place] [employer] (USPSTF) when recommending preventive services for our patients. Because we follow these guidelines, sometimes recommendations change over time as research supports it.  (For example, a prostate screening blood test is no longer routinely recommended for men with no symptoms). Of course, you and your doctor may decide to screen more often for some diseases, based on your risk and co-morbidities (chronic disease you are already diagnosed with). Preventive services for you include:  - Medicare offers their members a free annual wellness visit, which is time for you and your primary care provider to discuss and plan for your preventive service needs. Take advantage of this benefit every year!  -All adults over age 72 should receive the recommended pneumonia vaccines. Current USPSTF guidelines recommend a series of two vaccines for the best pneumonia protection.   -All adults should have a flu vaccine yearly and tetanus vaccine every 10 years.  -All adults age 48 and older should receive the shingles vaccines (series of two vaccines). -All adults age 38-68 who are overweight should have a diabetes screening test once every three years.   -Other screening tests & preventive services for persons with diabetes include: an eye exam to screen for diabetic retinopathy, a kidney function test, a foot exam, and stricter control over your cholesterol.   -Cardiovascular screening for adults with routine risk involves an electrocardiogram (ECG) at intervals determined by the provider.   -Colorectal cancer screening should be done for adults age 54-65 with no increased risk factors for colorectal cancer. There are a number of acceptable methods of screening for this type of cancer. Each test has its own benefits and drawbacks. Discuss with your provider what is most appropriate for you during your annual wellness visit.  The different tests include: colonoscopy (considered the best screening method), a fecal occult blood test, a fecal DNA test, and sigmoidoscopy.  -All adults born between [facility] should be screened once for Hepatitis C.  -An Abdominal Aortic Aneurysm (AAA) Screening is recommended for men age 73-68 who has ever smoked in their lifetime. Here is a list of your current Health Maintenance items (your personalized list of preventive services) with a due date:  Health Maintenance Due   Topic Date Due    Shingles Vaccine (1 of 2) Never done          Preventing Falls: Care Instructions  Your Care Instructions    Getting around your home safely can be a challenge if you have injuries or health problems that make it easy for you to fall. Loose rugs and furniture in walkways are among the dangers for many older people who have problems walking or who have poor eyesight. People who have conditions such as arthritis, osteoporosis, or dementia also have to be careful not to fall. You can make your home safer with a few simple measures. Follow-up care is a key part of your treatment and safety. Be sure to make and go to all appointments, and call your doctor if you are having problems. It's also a good idea to know your test results and keep a list of the medicines you take. How can you care for yourself at home? Taking care of yourself  · You may get dizzy if you do not drink enough water. To prevent dehydration, drink plenty of fluids, enough so that your urine is light yellow or clear like water. Choose water and other caffeine-free clear liquids. If you have kidney, heart, or liver disease and have to limit fluids, talk with your doctor before you increase the amount of fluids you drink. · Exercise regularly to improve your strength, muscle tone, and balance. Walk if you can. Swimming may be a good choice if you cannot walk easily. · Have your vision and hearing checked each year or any time you notice a change. If you have trouble seeing and hearing, you might not be able to avoid objects and could lose your balance. · Know the side effects of the medicines you take. Ask your doctor or pharmacist whether the medicines you take can affect your balance.  Sleeping pills or sedatives can affect your balance. · Limit the amount of alcohol you drink. Alcohol can impair your balance and other senses. · Ask your doctor whether calluses or corns on your feet need to be removed. If you wear loose-fitting shoes because of calluses or corns, you can lose your balance and fall. · Talk to your doctor if you have numbness in your feet. Preventing falls at home  · Remove raised doorway thresholds, throw rugs, and clutter. Repair loose carpet or raised areas in the floor. · Move furniture and electrical cords to keep them out of walking paths. · Use nonskid floor wax, and wipe up spills right away, especially on ceramic tile floors. · If you use a walker or cane, put rubber tips on it. If you use crutches, clean the bottoms of them regularly with an abrasive pad, such as steel wool. · Keep your house well lit, especially [place], and outside walkways. Use night-lights in areas such as hallways and bathrooms. Add extra light switches or use remote switches (such as switches that go on or off when you clap your hands) to make it easier to turn lights on if you have to get up during the night. · Install sturdy handrails on stairways. · Move items in your cabinets so that the things you use a lot are on the lower shelves (about waist level). · Keep a cordless phone and a flashlight with new batteries by your bed. If possible, put a phone in each of the main rooms of your house, or carry a cell phone in case you fall and cannot reach a phone. Or, you can wear a device around your neck or wrist. You push a button that sends a signal for help. · Wear low-heeled shoes that fit well and give your feet good support. Use footwear with nonskid soles. Check the heels and soles of your shoes for wear. Repair or replace worn heels or soles. · Do not wear socks without shoes on wood floors. · Walk on the grass when the sidewalks are slippery.  If you live in an area that gets snow and ice in the winter, sprinkle salt on slippery steps and sidewalks. Preventing falls in the bath  · Install grab bars and nonskid mats inside and outside your shower or tub and near the toilet and sinks. · Use shower chairs and bath benches. · Use a hand-held shower head that will allow you to sit while showering. · Get into a tub or shower by putting the weaker leg in first. Get out of a tub or shower with your strong side first.  · Repair loose toilet seats and consider installing a raised toilet seat to make getting on and off the toilet easier. · Keep your bathroom door unlocked while you are in the shower. Where can you learn more? Go to http://www.slaughter.com/. Enter [phone] in the search box to learn more about \"Preventing Falls: Care Instructions. \"  Current as of: March 16, 2018  Content Version: 11.8  © 7171-6319 [employer]wise, [facility]. Care instructions adapted under license by [employer] (which disclaims liability or warranty for this information). If you have questions about a medical condition or this instruction, always ask your healthcare professional. [address] any warranty or liability for your use of this information.

## 2022-03-22 NOTE — ASSESSMENT & PLAN NOTE
borderline controlled, continue current medications, continue current treatment plan, medication adherence emphasized, lifestyle modifications recommended strongly recommended to use inhaler more often.   He has stopped smoking

## 2022-03-23 NOTE — PATIENT INSTRUCTIONS
Your Dual Chamber Pacemaker procedure has been scheduled for 3/25/22 at 12:00 pm, at [facility].    Please report to Admitting Department by 10:00 am, or 2 hours prior to your scheduled procedure. Please bring a list of your current medications and medication bottles, if able, to the hospital on this day. You will be unable to drive after your procedure so please make sure to bring someone with you to your procedure. You will need to have nothing to eat or drink after midnight, the night prior to your procedure. You may have small sips of water, if needed, to take with your medication. You should not take your Insulin the morning of your procedure. After your procedure, you will need to follow up with Dr. Radha Massey nurse for a wound and device check. Your follow-up appointment has been scheduled for 3/31/22 at 11:00 am.     Hibiclens 4% topical solution has been ordered and sent into your pharmacy  Patient it start Hibiclens application 5 days prior to procedure date    Directions Hibiclens 4%: Start cleanse 5 days prior to procedure   1. Rinse area (upper chest and upper arms) with water. 2. Apply minimum amount necessary to scrub the upper chest area from shoulder/neck to mid line of chest and to below the nipple each of  5 nights before the day of the procedure  3. Let solution dry.

## 2022-03-23 NOTE — H&P (VIEW-ONLY)
Cardiac Electrophysiology OFFICE Consultation Note     Subjective:      Jeneen Landau is a [de-identified] y.o. 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 Calculated left ventricular ejection fraction is 62%. No regional wall motion abnormality noted. · Aortic Valve: Moderate to severe aortic valve stenosis is present, mean gradient is 32 mmHg. Aortic valve area is 1.5 cm2. · There is a prior study available for comparison. Prior study date: 12/9/2020. As compared to the previous study, there are no significant changes.  Aortic stenosis gradient is slightly increased, but dimensionless index is the same    Signed by: Ivette Espinal MD on 6/7/2021  8:17 PM          Patient Active Problem List   Diagnosis Code    Edema R60.9    Pulmonary emphysema ([address] J43.9    Benign prostatic hyperplasia N40.0    Thrombocytopenia ([address] D69.6    Tobacco abuse Z72.0    Nonrheumatic aortic valve stenosis I35.0    Arthritis of right hip M16.11    History of bladder cancer Z85.51    Benign essential HTN I10    Prostate nodule N40.2    Over weight E66.3    Type 2 diabetes with nephropathy ([address] E11.21    Controlled type 2 diabetes mellitus with diabetic autonomic neuropathy, with long-term current use of insulin (HCC) E11.43, Z79.4    Symptomatic sinus bradycardia R00.1    At high risk for falls Z91.81     Current Outpatient Medications   Medication Sig Dispense Refill    chlorhexidine (Hibiclens) 4 % liquid Apply to the upper chest area from shoulder/neck to mid line of chest and to below the nipple every day, 5 days prior to the procedure. 1 Each 0    BD Insulin Syringe Ultra-Fine 0.5 mL 30 gauge x 1/2\" syrg USE AS DIRECTED TWICE A DAY TO INJECT INSULIN 60 Each 5    NovoLIN 70/30 U-100 Insulin 100 unit/mL (70-30) injection INJECT 6 UNITS UNDER THE SKIN BEFORE BREAFAST AND DINNER 10 mL 1    nicotine (NICODERM CQ) 21 mg/24 hr APPLY 1 PATCH TO SKIN ONCE EVERY 24 HOURS 28 Patch 1    gabapentin (NEURONTIN) 300 mg capsule TAKE 1 CAPSULE BY MOUTH EVERY DAY AT NIGHT 30 Capsule 0    furosemide (LASIX) 40 mg tablet TAKE 1 TABLET BY MOUTH EVERY DAY 90 Tablet 0    tamsulosin (FLOMAX) 0.4 mg capsule TAKE 1 CAPSULE BY MOUTH DAILY 90 Capsule 1    potassium chloride (K-DUR, KLOR-CON) 20 mEq tablet Take 1 Tab by mouth daily. 90 Tab 3    Insulin Syringe-Needle U-100 (BD Insulin Syringe Ultra-Fine) 0.3 mL 31 gauge x 5/16\" syrg Use twice daily to inject insulin. 200 Syringe 1    metFORMIN ER (GLUCOPHAGE XR) 500 mg tablet Take 1 Tab by mouth daily (with dinner). 90 Tab 1    glucose blood VI test strips (LIBRADO BLOOD GLUCOSE TEST STRIP) strip USE TO TEST TWICE DAILY      multivit with minerals/lutein (MULTIVITAMIN 50 PLUS PO) Take  by mouth.  albuterol (PROAIR HFA) 90 mcg/actuation inhaler Take 1 Puff by inhalation.       mometasone-formoterol (DULERA) 100-5 mcg/actuation HFA inhaler INL 2 PFS PO BID      ONETOUCH ULTRA TEST strip USE TO TEST TWICE DAILY 100 Strip 0     No Known Allergies  Past Medical History:   Diagnosis Date    COPD (chronic obstructive pulmonary disease) ([address]     Diabetes ([address]     Enlarged prostate     Moderate aortic stenosis     echo June 2016 [place]     Pneumonia      Past Surgical History:   Procedure Laterality Date    HX GI      cholecystectomy    HX RETINAL DETACHMENT REPAIR      HX UROLOGICAL      bladder surgery    MT ABDOMEN SURGERY PROC UNLISTED      hernia repair     Family History   Problem Relation Age of Onset    Diabetes Mother     Diabetes Father         had angina    Diabetes Paternal Grandmother      Social History     Tobacco Use    Smoking status: Former Smoker     Packs/day: 0.50     Quit date: 7/10/2021     Years since quittin.7    Smokeless tobacco: Never Used    Tobacco comment: 1 pack a day  ( 16-18 ) a day    Substance Use Topics    Alcohol use: Yes     Alcohol/week: 14.0 standard drinks     Types: 14 Cans of beer per week     Comment: Social         Review of Systems:   Constitutional: Negative for fever, chills, weight loss, + malaise/fatigue. HEENT: Negative for nosebleeds, vision changes. Respiratory: Negative for cough, hemoptysis  Cardiovascular: Negative for chest pain, palpitations, orthopnea, claudication, leg swelling, syncope, and PND. +Dizziness and WAY  Gastrointestinal: Negative for nausea, vomiting, diarrhea, blood in stool and melena. Genitourinary: Negative for dysuria, and hematuria. Musculoskeletal: Negative for myalgias, arthralgia. Skin: Negative for rash. Heme: Does not bleed or bruise easily. Neurological: Negative for speech change and focal weakness     Objective:     Visit Vitals  /72 (BP 1 Location: Left arm, BP Patient Position: Sitting, BP Cuff Size: Adult)   Pulse (!) 58   Resp 18   Ht 5' 11\" (1.803 m)   Wt 202 lb (91.6 kg)   SpO2 100%   BMI 28.17 kg/m²      Physical Exam:   Constitutional: well-developed and well-nourished. No respiratory distress. Head: Normocephalic and atraumatic. Eyes: Pupils are equal, round  ENT: hearing normal  Neck: supple. No JVD present. Cardiovascular: slow rate, regular rhythm. Exam reveals no gallop and no friction rub. 3/6 systolic RUSB murmur heard. Pulmonary/Chest: Effort normal and breath sounds normal. No wheezes. Abdominal: Soft, no tenderness. Mildly distended   Musculoskeletal: no edema. Neurological: alert,oriented. Skin: Skin is warm and dry  Psychiatric: normal mood and affect.  Behavior is normal. Judgment and thought content normal.           Assessment/Plan:       ICD-10-CM ICD-9-CM    1. Bradycardia  R00.1 427.89    2. Third degree AV block (HCC)  I44.2 426.0    3. Benign essential HTN  I10 401.1    4. Moderate aortic stenosis  I35.0 424.1    5. Dizziness  R42 780.4       He has intermittent third degree av block on holter but symptoms have been progressive and consistent with bradycardia  This is irreversible  He agrees to dual chamber pacer implant  Risks involve device implant include but are not limited to bleeding, infection, valvular damage, heart attack, stroke, lung collapse (pneumothorax or hemothorax), heart collapse (pericardial tamponade), heart perforation, kidney failure, death. Elective or emergency surgery may be required to repair some of these complications. Prolonged hospitalization would be required. General anesthesia may be required for the procedure. Thank you for involving me in this patient's care and please call with further concerns or questions. Luz Oviedo M.D.   Electrophysiology/Cardiology  [facility]  [address] [place] [address]                             [phone]

## 2022-03-23 NOTE — PROGRESS NOTES
Cardiac Electrophysiology OFFICE Consultation Note     Subjective:      Zuri Toussaint is a [de-identified] y.o. 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 Calculated left ventricular ejection fraction is 62%. No regional wall motion abnormality noted. · Aortic Valve: Moderate to severe aortic valve stenosis is present, mean gradient is 32 mmHg. Aortic valve area is 1.5 cm2. · There is a prior study available for comparison. Prior study date: 12/9/2020. As compared to the previous study, there are no significant changes.  Aortic stenosis gradient is slightly increased, but dimensionless index is the same    Signed by: Andrew Tamayo MD on 6/7/2021  8:17 PM          Patient Active Problem List   Diagnosis Code    Edema R60.9    Pulmonary emphysema ([address] J43.9    Benign prostatic hyperplasia N40.0    Thrombocytopenia ([address] D69.6    Tobacco abuse Z72.0    Nonrheumatic aortic valve stenosis I35.0    Arthritis of right hip M16.11    History of bladder cancer Z85.51    Benign essential HTN I10    Prostate nodule N40.2    Over weight E66.3    Type 2 diabetes with nephropathy ([address] E11.21    Controlled type 2 diabetes mellitus with diabetic autonomic neuropathy, with long-term current use of insulin (HCC) E11.43, Z79.4    Symptomatic sinus bradycardia R00.1    At high risk for falls Z91.81     Current Outpatient Medications   Medication Sig Dispense Refill    chlorhexidine (Hibiclens) 4 % liquid Apply to the upper chest area from shoulder/neck to mid line of chest and to below the nipple every day, 5 days prior to the procedure. 1 Each 0    BD Insulin Syringe Ultra-Fine 0.5 mL 30 gauge x 1/2\" syrg USE AS DIRECTED TWICE A DAY TO INJECT INSULIN 60 Each 5    NovoLIN 70/30 U-100 Insulin 100 unit/mL (70-30) injection INJECT 6 UNITS UNDER THE SKIN BEFORE BREAFAST AND DINNER 10 mL 1    nicotine (NICODERM CQ) 21 mg/24 hr APPLY 1 PATCH TO SKIN ONCE EVERY 24 HOURS 28 Patch 1    gabapentin (NEURONTIN) 300 mg capsule TAKE 1 CAPSULE BY MOUTH EVERY DAY AT NIGHT 30 Capsule 0    furosemide (LASIX) 40 mg tablet TAKE 1 TABLET BY MOUTH EVERY DAY 90 Tablet 0    tamsulosin (FLOMAX) 0.4 mg capsule TAKE 1 CAPSULE BY MOUTH DAILY 90 Capsule 1    potassium chloride (K-DUR, KLOR-CON) 20 mEq tablet Take 1 Tab by mouth daily. 90 Tab 3    Insulin Syringe-Needle U-100 (BD Insulin Syringe Ultra-Fine) 0.3 mL 31 gauge x 5/16\" syrg Use twice daily to inject insulin. 200 Syringe 1    metFORMIN ER (GLUCOPHAGE XR) 500 mg tablet Take 1 Tab by mouth daily (with dinner). 90 Tab 1    glucose blood VI test strips (LIBRADO BLOOD GLUCOSE TEST STRIP) strip USE TO TEST TWICE DAILY      multivit with minerals/lutein (MULTIVITAMIN 50 PLUS PO) Take  by mouth.  albuterol (PROAIR HFA) 90 mcg/actuation inhaler Take 1 Puff by inhalation.       mometasone-formoterol (DULERA) 100-5 mcg/actuation HFA inhaler INL 2 PFS PO BID      ONETOUCH ULTRA TEST strip USE TO TEST TWICE DAILY 100 Strip 0     No Known Allergies  Past Medical History:   Diagnosis Date    COPD (chronic obstructive pulmonary disease) ([address]     Diabetes ([address]     Enlarged prostate     Moderate aortic stenosis     echo June 2016 Dion[place]     Pneumonia      Past Surgical History:   Procedure Laterality Date    HX GI      cholecystectomy    HX RETINAL DETACHMENT REPAIR      HX UROLOGICAL      bladder surgery    [place] ABDOMEN SURGERY PROC UNLISTED      hernia repair     Family History   Problem Relation Age of Onset    Diabetes Mother     Diabetes Father         had angina    Diabetes Paternal Grandmother      Social History     Tobacco Use    Smoking status: Former Smoker     Packs/day: 0.50     Quit date: 7/10/2021     Years since quittin.7    Smokeless tobacco: Never Used    Tobacco comment: 1 pack a day  ( 16-18 ) a day    Substance Use Topics    Alcohol use: Yes     Alcohol/week: 14.0 standard drinks     Types: 14 Cans of beer per week     Comment: Social         Review of Systems:   Constitutional: Negative for fever, chills, weight loss, + malaise/fatigue. HEENT: Negative for nosebleeds, vision changes. Respiratory: Negative for cough, hemoptysis  Cardiovascular: Negative for chest pain, palpitations, orthopnea, claudication, leg swelling, syncope, and PND. +Dizziness and WAY  Gastrointestinal: Negative for nausea, vomiting, diarrhea, blood in stool and melena. Genitourinary: Negative for dysuria, and hematuria. Musculoskeletal: Negative for myalgias, arthralgia. Skin: Negative for rash. Heme: Does not bleed or bruise easily. Neurological: Negative for speech change and focal weakness     Objective:     Visit Vitals  /72 (BP 1 Location: Left arm, BP Patient Position: Sitting, BP Cuff Size: Adult)   Pulse (!) 58   Resp 18   Ht 5' 11\" (1.803 m)   Wt 202 lb (91.6 kg)   SpO2 100%   BMI 28.17 kg/m²      Physical Exam:   Constitutional: well-developed and well-nourished. No respiratory distress. Head: Normocephalic and atraumatic. Eyes: Pupils are equal, round  ENT: hearing normal  Neck: supple. No JVD present. Cardiovascular: slow rate, regular rhythm. Exam reveals no gallop and no friction rub. 3/6 systolic RUSB murmur heard. Pulmonary/Chest: Effort normal and breath sounds normal. No wheezes. Abdominal: Soft, no tenderness. Mildly distended   Musculoskeletal: no edema. Neurological: alert,oriented. Skin: Skin is warm and dry  Psychiatric: normal mood and affect.  Behavior is normal. Judgment and thought content normal.           Assessment/Plan:       ICD-10-CM ICD-9-CM    1. Bradycardia  R00.1 427.89    2. Third degree AV block (HCC)  I44.2 426.0    3. Benign essential HTN  I10 401.1    4. Moderate aortic stenosis  I35.0 424.1    5. Dizziness  R42 780.4       He has intermittent third degree av block on holter but symptoms have been progressive and consistent with bradycardia  This is irreversible  He agrees to dual chamber pacer implant  Risks involve device implant include but are not limited to bleeding, infection, valvular damage, heart attack, stroke, lung collapse (pneumothorax or hemothorax), heart collapse (pericardial tamponade), heart perforation, kidney failure, death. Elective or emergency surgery may be required to repair some of these complications. Prolonged hospitalization would be required. General anesthesia may be required for the procedure. Thank you for involving me in this patient's care and please call with further concerns or questions. Richy Carreno M.D.   Electrophysiology/Cardiology  [facility] [place]  [address] [place] [address]                             [phone]

## 2022-03-24 NOTE — TELEPHONE ENCOUNTER
Verified patient with two types of identifiers. Irma Olguin verified on [address] and notified of MD recommendations. Patient's wife verbalized understanding and will call with any other questions.

## 2022-03-24 NOTE — PROGRESS NOTES
Please inform patient,Results for thyroid function, average sugar and cholesterol results are excellent and at goal.This time his kidney function is significantly abnormal including high potassium, most likely due to his decreased cardiac output from third degree AV block. Hopefully it will improve after his pacemaker procedure. His blood count shows significantly low RBC, hemoglobin and platelet count. He has history of hematuria from bladder cancer. Strongly recommend to follow-up with his hematology Dr. John Magaña and urology. Please fax results to Dr. John Magaña. I have routed this results to cardiology also. Thanks

## 2022-03-24 NOTE — TELEPHONE ENCOUNTER
----- Message from Angela Lanes, MD sent at 3/24/2022 12:25 PM EDT -----  Stop potassium if he still uses it  Ok to proceed with pacer

## 2022-03-25 PROBLEM — Z95.0 PACEMAKER: Status: ACTIVE | Noted: 2022-01-01

## 2022-03-25 NOTE — PROGRESS NOTES
Discharge Note    Patient was able to eat, drink, walk, void and dress independently. I removed their IV. I reviewed discharge instructions with them and their son Davis Kc and they said they understood them. I took them to the front via wheelchair. We met the patient's ride at the front of the hospital. They drove away.

## 2022-03-25 NOTE — PROGRESS NOTES
TRANSFER - IN REPORT:    Verbal report received from [place] on [place]  being received from procedural area for routine post - op. Report consisted of patients Situation, Background, Assessment and Recommendations(SBAR). Information from the following report(s) Procedure Summary, MAR and Recent Results was reviewed with the receiving clinician. Opportunity for questions and clarification was provided. Assessment completed upon patients arrival to [facility] and care [facility]. Cardiac Cath Lab Recovery Arrival Note:    Doug Feldman arrived to [facility]. Patient procedure= PPI. Patient on cardiac monitor, non-invasive blood pressure, SPO2 monitor. On RA. .  IV  of NS on pump at 25 ml/hr. Patient status doing well without problems. Patient is A&Ox 3. Patient reports no mcomplaints. PROCEDURE SITE CHECK:    Procedure site:without any bleeding and hematoma, no pain/discomfort reported at procedure site. No change in patient status. Continue to monitor patient and status.

## 2022-03-25 NOTE — INTERVAL H&P NOTE
Update History & Physical    The Patient's History and Physical of March 23, 2022 was reviewed with the patient and I examined the patient. There was no change. The surgical site was confirmed by the patient and me. Plan:  The risk, benefits, expected outcome, and alternative to the recommended procedure have been discussed with the patient. Patient understands and wants to proceed with the procedure.     Electronically signed by Sadie Alas MD on 3/25/2022 at 10:20 AM

## 2022-03-25 NOTE — PROGRESS NOTES
Cardiac Cath Lab Procedure Area Arrival Note:    Tami Rasmussen arrived to Cardiac Cath Lab, Procedure Area. Patient identifiers verified with NAME and DATE OF BIRTH. Procedure verified with patient. Consent forms verified. Allergies verified. Patient informed of procedure and plan of care. Questions answered with review. Patient voiced understanding of procedure and plan of care. Patient on cardiac monitor, non-invasive blood pressure, SPO2 monitor. On Roomair. IV of 0.9ns on pump at 25 ml/hr. Patient status doing well without problems. Patient is A&Ox 3. Patient reports no s/s of discomfort. Patient medicated during procedure with orders obtained and verified by Dr. Jose Alejandro Vick. Refer to patients Cardiac Cath Lab PROCEDURE REPORT for vital signs, assessment, status, and response during procedure, printed at end of case. Printed report on chart or scanned into chart.

## 2022-03-25 NOTE — DISCHARGE INSTRUCTIONS
PATIENT INSTRUCTIONS POST-PACEMAKER IMPLANT    1. No heavy lifting or exercises with the left arm for 4 weeks. This includes the following:  Do not raise arm above the shoulder level to comb hair, pull on clothes, etc... Do not use the affected arm to pull up or push up from a seated or laying  down position. Do not allow anyone else to pull on the affected arm. 2.  You may shower with your Aquacel dressing in place. You may remove your dressing in 1 week, or it can be removed in clinic at follow up if you prefer. 3.  Do not drive for 3 days    4. Call Dr. Alex Morrissey at [phone] if you experience any of the following symptoms:  1. Redness at the pacemaker site  2. Swelling at or around the pacemaker or in the left arm  3. Pain around the pacemaker  4. Dizziness, lightheadedness, fainting spells  5. Lack of energy  6. Shortness of breath  7. Rapid heart rate  8. Chest or muscle twitches    5. Follow-up with Dr. Suzette Mcfarlane office as scheduled below. Future Appointments   Date Time Provider Cyn Heath   3/31/2022 11:00 AM PACEMAKER3, JEREMIE PATEL BS AMB   3/31/2022 11:30 AM WOUND CHECKS, JEREMIE PATEL BS AMB   4/6/2022 11:00 AM VASCULAR, JEREMIE PATEL BS AMB   5/5/2022  3:40 PM MD AMANDA Cloud BS AMB   6/13/2022  1:00 PM ECHOTWOJEERMIE BS AMB   6/13/2022  1:40 PM MD AMANDA Emmanuel BS AMB   7/21/2022  2:00 PM Hadley Umana MD [facility] BS AMB     6. You may use over the counter pain medication (Tylenol) and ice pack for pain relief as needed. You may wear the sling as a reminder to keep your arm below the your shoulder. Regine Mccurdy M.D.  Former[facility]  Electrophysiology/Cardiology  [facility] [place]  [address] [place] [address] , [facility] [address]  [address]  [phone]

## 2022-03-25 NOTE — PROGRESS NOTES
TRANSFER - OUT REPORT:    Verbal report given to Gisela Calderon RN on Mary Anne Feldman being transferred to [facility] for routine post - op   Report consisted of patients Situation, Background, Assessment and   Recommendations(SBAR). Information from the following report(s) SBAR, Procedure Summary, Intake/Output, MAR and Med Rec Status was reviewed with the receiving nurse. Opportunity for questions and clarification was provided.

## 2022-03-25 NOTE — PROGRESS NOTES
Cardiac Cath Lab Recovery Arrival Note:      pK Candelario arrived to Cardiac Cath Lab, Recovery Area. Staff introduced to patient. Patient identifiers verified with NAME and DATE OF BIRTH. Procedure verified with patient. Consent forms reviewed and signed by patient or authorized representative and verified. Allergies verified. Patient and family oriented to department. Patient and family informed of procedure and plan of care. Questions answered with review. Patient prepped for procedure, per orders from physician, prior to arrival.    Patient on cardiac monitor, non-invasive blood pressure, SPO2 monitor. On room air. Patient is A&Ox 4. Patient reports no complaints. Patient in stretcher, in low position, with side rails up, call bell within reach, patient instructed to call if assistance as needed. Patient prep in: [facility], [place] [address] fast track. Patient family has pager # Jagruti Ayala son [phone]  Family in: waiting upstairs.     Prep by: Shawanda Padron

## 2022-03-31 PROBLEM — L08.9 INFECTED ULCER OF SKIN, LIMITED TO BREAKDOWN OF SKIN (HCC): Status: ACTIVE | Noted: 2022-01-01

## 2022-03-31 PROBLEM — L98.491 INFECTED ULCER OF SKIN, LIMITED TO BREAKDOWN OF SKIN (HCC): Status: ACTIVE | Noted: 2022-01-01

## 2022-03-31 NOTE — PROGRESS NOTES
Chief Complaint   Patient presents with    Wound Check         1. \"Have you been to the ER, urgent care clinic since your last visit? Hospitalized since your last visit? \" No    2. \"Have you seen or consulted any other health care providers outside of the [address] [place] [address] since your last visit? \" No     3. For patients aged 39-70: Has the patient had a colonoscopy / FIT/ Cologuard? NA - based on age      If the patient is female:    4. For patients aged 41-77: Has the patient had a mammogram within the past 2 years? NA - based on age or sex      11. For patients aged 21-65: Has the patient had a pap smear?  NA - based on age or sex         3 most recent PHQ Screens 3/31/2022   PHQ Not Done -   Little interest or pleasure in doing things Not at all   Feeling down, depressed, irritable, or hopeless Not at all   Total Score PHQ 2 0       Health Maintenance Due   Topic Date Due    Shingrix Vaccine Age 50> (1 of 2) Never done

## 2022-03-31 NOTE — PATIENT INSTRUCTIONS

## 2022-03-31 NOTE — ASSESSMENT & PLAN NOTE
New onset, ischemic versus venous ulcer. Started on antibiotic as well as daily dressing.   Strongly recommended to use compression socks and stop smoking

## 2022-03-31 NOTE — PROGRESS NOTES
HISTORY OF PRESENT ILLNESS  Llana Gowers is a [de-identified] y.o. male. Patient was seen today for follow-up on recent hospitalization as well as wound on left lower leg. He was supposed to go to his first wound care appointment today. He was confused and walked into my office today. [facility] is seen for follow up from recent admission to [facility] on 3/25/22. .  We reviewed the active problem list, medication list, allergies, notes from last encounter, lab results, endoscopy procedure notes. He was earlier seen by me in the office on 03/23/22 for routine follow-up and was found to have significant symptomatic bradycardia. He had frequent dizzy spells and shortness of breath. His Holter monitor showed third-degree AV block. He was referred to cardiology for urgent evaluation. Patient had urgent dual-chamber pacer implant by Dr. Long Greco. His postprocedure x-ray chest was very reassuring. He just had his first visit for cardiac pacemaker check today. His incision is healing well without infection. He is taking his insulin, Metformin, gabapentin, and Zosyn as directed & without any side effects. He reports symptoms are significantly improved. He has noticed significant improvement in energy and dizzy spells. Cellulitis:   Llana Gowers is an [de-identified] y.o. male who presents for evaluation of a probable skin infection located on the skin of left lower leg. Onset of symptoms was gradual and a few days ago, with rapidly worsening since that time. Symptoms include erythema, tenderness, pain and drainage. Patient denies  fever, chills, nausea and vomiting. There is not a history trauma to the area. Treatment to date has included topical antibiotic with no relief. Patient has long-term history of smoking and also history of chronic bilateral leg edema. History of diabetes and is on insulin.     Review of Systems   Constitutional: Negative for chills, fever and malaise/fatigue. HENT: Negative for congestion, ear pain, sore throat and tinnitus. Eyes: Negative for blurred vision, double vision, pain and discharge. Respiratory: Negative for cough, shortness of breath and wheezing. Cardiovascular: Negative for chest pain, palpitations and leg swelling. Gastrointestinal: Negative for abdominal pain, blood in stool, constipation, diarrhea, nausea and vomiting. Genitourinary: Negative for dysuria, frequency, hematuria and urgency. Musculoskeletal: Negative for back pain, joint pain and myalgias. Skin: Negative for rash. Painful lesion on skin of left lower leg   Neurological: Negative for dizziness, tremors, seizures and headaches. Endo/Heme/Allergies: Negative for polydipsia. Does not bruise/bleed easily. Psychiatric/Behavioral: Negative for depression and substance abuse. The patient is not nervous/anxious. Physical Exam  Vitals and nursing note reviewed. Constitutional:       Appearance: He is well-developed. He is not diaphoretic. HENT:      Head: Normocephalic and atraumatic. Right Ear: External ear normal.      Mouth/Throat:      Pharynx: No oropharyngeal exudate. Eyes:      General: No scleral icterus. Conjunctiva/sclera: Conjunctivae normal.      Pupils: Pupils are equal, round, and reactive to light. Neck:      Thyroid: No thyromegaly. Vascular: No JVD. Cardiovascular:      Rate and Rhythm: Normal rate and regular rhythm. Heart sounds: Normal heart sounds. No murmur heard. Pulmonary:      Effort: Pulmonary effort is normal.      Breath sounds: Normal breath sounds. No wheezing. Chest:       Abdominal:      General: Bowel sounds are normal. There is no distension. Palpations: Abdomen is soft. There is no mass. Musculoskeletal:         General: No tenderness. Normal range of motion. Cervical back: Normal range of motion and neck supple.         Legs:    Lymphadenopathy:      Cervical: No cervical adenopathy. Skin:     General: Skin is warm and dry. Findings: No rash. Neurological:      Mental Status: He is alert and oriented to person, place, and time. Cranial Nerves: No cranial nerve deficit. Deep Tendon Reflexes: Reflexes are normal and symmetric. Reflexes normal.         ASSESSMENT and PLAN  Diagnoses and all orders for this visit:    1. Hospital discharge follow-up    2. Infected ulcer of skin, limited to breakdown of skin [facility])  Assessment & Plan:   New onset, ischemic versus venous ulcer. Started on antibiotic as well as daily dressing. Strongly recommended to use compression socks and stop smoking    Orders:  -     cephALEXin (KEFLEX) 500 mg capsule; Take 1 Capsule by mouth two (2) times a day for 10 days. 3. Cellulitis of left lower extremity  -     cephALEXin (KEFLEX) 500 mg capsule; Take 1 Capsule by mouth two (2) times a day for 10 days. 4. Edema, unspecified type  -     furosemide (LASIX) 40 mg tablet; Take 1 Tablet by mouth daily. Wound was cleaned in office today with normal saline. Applied bacitracin zinc and covered with nonadhesive Band-Aid. Started on antibiotic to cover for infection. We discussed daily dressing, compression socks elevation at nighttime really smoking. Recommended to follow up with wound care. Discussed lifestyle issues and health guidance given  Patient was given an after visit summary which includes diagnoses, vital signs, current medications, instructions and references & authorized prescriptions . Results of labs will be conveyed to patient, once available. Pt verbalized instructions I provided and expressed understanding of discussion that was held today. Please note that this dictation was completed with [employer], the [employer] voice recognition software. Quite often unanticipated grammatical, syntax, homophones, and other interpretive errors are inadvertently transcribed by the computer software.   Please disregard these errors. Please excuse any errors that have escaped final proofreading. Thank you.

## 2022-04-04 NOTE — TELEPHONE ENCOUNTER
MD Rony Pollard,    Patient asking for smaller syringes per [facility].  0.3ml. These on patient profile also. Thanks, Jyothi Montesinos    Last Visit: 3/31/22 MD Rony Pollard  Next Appointment: 7//21/22 Rony Pollard  Previous Refill Encounter(s): 3/13/22 60 + 5 (0.5ml sryringe)    Requested Prescriptions     Pending Prescriptions Disp Refills    Insulin Syringe-Needle U-100 (BD Insulin Syringe Ultra-Fine) 0.3 mL 31 gauge x 5/16\" syrg 200 Each 2     Sig: Use twice daily to inject insulin.

## 2022-04-05 NOTE — ACP (ADVANCE CARE PLANNING)
Advance Care Planning   Ambulatory ACP Specialist Patient Outreach    Date:  3/23/2022    ACP Specialist:  Gabby Sandra LPN    Outreach call to patient in follow-up to ACP Specialist referral from:    [x] PCP  [] Provider   [] Ambulatory Care Management [] Other     For:                  [x] Advance Directive Assistance              [] Complete Portable DNR order              [] Complete POST/MOST              [x] Code Status Discussion             [] Discuss Goals of Care             [] Early ACP Decision-Making              [] Other (Specify)    Date Referral Received: 3/22/22    Today's Outreach:  [] First   [] Second  [] Third       Third outreach made by: [] Phone  [] Email / mail    [] Connectbrightt     Intervention:  [] Spoke with Patient   [] Left VM requesting return call      Outcome: Pt is current in-pt. Will follow up within one week. Next Step:   [] ACP scheduled conversation  [x] Outreach again in one week               [] Email / Mail ACP Info Sheets  [] Email / Mail Advance Directive   [] Closing referral.  Routing closure to referring provider/staff and to ACP Specialist . [] Closure letter mailed to patient with invitation to contact ACP Specialist if / when ready.   Thank you for this referral.

## 2022-04-05 NOTE — ACP (ADVANCE CARE PLANNING)
Advance Care Planning   Ambulatory ACP Specialist Patient Outreach    Date:  4/5/2022    ACP Specialist:  Lorraine Cannon LPN    Outreach call to patient in follow-up to ACP Specialist referral from:    [x] PCP  [] Provider   [] Ambulatory Care Management [] Other     For:                  [x] Advance Directive Assistance              [] Complete Portable DNR order              [] Complete POST/MOST              [x] Code Status Discussion             [] Discuss Goals of Care             [] Early ACP Decision-Making              [] Other (Specify)    Date Referral Received: 3/22/22    Today's Outreach:  [x] First   [] Second  [] Third       Third outreach made by: [] Phone  [] Email / mail    [] [employer]     Intervention:  [] Spoke with Patient   [x] Left VM requesting return call      Outcome:  First attempt to contact pt regarding ACP. No answer on mobile phone. VM left requesting a return call. Will attempt 2nd outreach within one week. Next Step:   [] ACP scheduled conversation  [x] Outreach again in one week               [] Email / Mail ACP Info Sheets  [] Email / Mail Advance Directive   [] Closing referral.  Routing closure to referring provider/staff and to ACP Specialist . [] Closure letter mailed to patient with invitation to contact ACP Specialist if / when ready.   Thank you for this referral.

## 2022-04-13 NOTE — ACP (ADVANCE CARE PLANNING)
Advance Care Planning   Ambulatory ACP Specialist Patient Outreach    Date:  4/13/2022    ACP Specialist:  Lanette Trotter LPN    Outreach call to patient in follow-up to ACP Specialist referral from:    [x] PCP  [] Provider   [] Ambulatory Care Management [] Other     For:                  [x] Advance Directive Assistance              [] Complete Portable DNR order              [] Complete POST/MOST              [x] Code Status Discussion             [] Discuss Goals of Care             [] Early ACP Decision-Making              [] Other (Specify)    Date Referral Received: 3/22/22    Today's Outreach:  [] First   [x] Second  [] Third       Third outreach made by: [] Phone  [] Email / mail    [] [employer]     Intervention:  [] Spoke with Patient   [x] Left VM requesting return call      Outcome: The second attempt was made to contact pt regarding ACP. No answer on mobile phone. VM left requesting a return call. Will outreach again within one week. Next Step:   [] ACP scheduled conversation  [x] Outreach again in one week               [] Email / Mail ACP Info Sheets  [] Email / Mail Advance Directive   [] Closing referral.  Routing closure to referring provider/staff and to ACP Specialist . [] Closure letter mailed to patient with invitation to contact ACP Specialist if / when ready.   Thank you for this referral.

## 2022-04-18 NOTE — ACP (ADVANCE CARE PLANNING)
Advance Care Planning   Ambulatory ACP Specialist Patient Outreach    Date:  4/18/2022    ACP Specialist:  Lanette Trotter LPN    Outreach call to patient in follow-up to ACP Specialist referral from:    [x] PCP  [] Provider   [] Ambulatory Care Management [] Other     For:                  [x] Advance Directive Assistance              [] Complete Portable DNR order              [] Complete POST/MOST              [x] Code Status Discussion             [] Discuss Goals of Care             [] Early ACP Decision-Making              [] Other (Specify)    Date Referral Received: 3/22/22    Today's Outreach:  [] First   [] Second  [x] Third       Third outreach made by: [] Phone  [x] Email / mail    [] [employer]     Intervention:  [] Spoke with Patient   [] Left VM requesting return call      Outcome: The final attempt was made to reach the pt. ACP documents sent to pt via e-mail. LPN's contact information was included. We will close the referral at this time. Next Step:   [] ACP scheduled conversation  [] Outreach again in one week               [x] Email / Mail ACP Info Sheets  [] Email / Mail Advance Directive   [x] Closing referral.  Routing closure to referring provider/staff and to ACP Specialist . [x] Closure letter mailed to patient with invitation to contact ACP Specialist if / when ready.   Thank you for this referral.

## 2022-06-13 NOTE — PROGRESS NOTES
[facility] [place]: Cary Ceron  [phone]    History of Present Illness:   Mr. Baum Signs is [de-identified] yo M with mild to moderate aortic stenosis, RBBB, COPD, HTN. Echo 2018 with normal LV function with moderate aortic stenosis, mean gradient of 24 mmHg. This is slightly increased from 17 mmHg in 2016. H/o bladder cancer and follows Dr Garth Hughes s/p MAURICE. Followed by hematology for chronic thrombocytopenia. On his last visit due to exertional shortness of breath and concern for sick sinus, obtained a stress test and an echocardiogram.  His stress test at the time checked out okay. He did have a monitor though that showed sick sinus and his presentation was consistent with symptomatic bradycardia. Subsequently underwent pacemaker placement for third degree AV block noted on the monitor with Dr. Jamison Jimenez. After this, his dizziness resolved. However, these last few months, he has been having exertional chest pain. When he does have the pain, it usually lasts a few minutes after he rests. He has been compliant with his medications. He is compensated on exam with clear lungs. He does have a III-IV/VI systolic murmur at the left sternal border. His echocardiogram today demonstrates preserved LV function, but his aortic stenosis is near the severe range with a mean gradient of 39-40 mmhg. By echocardiogram a few months ago, his mean gradient was 41. Assessment and Plan:   1. Unstable angina. Exertional chest pain with multiple risk factors. This has been progressive and he already had a stress test a few months ago. Will proceed with a left heart catheterization for further evaluation. Discussed the benefits and risks and he is agreeable to proceeding. 2. Aortic stenosis. This is in the close to severe range. I do think he will need to have surgery, likely TAVR within the next year or two. Unclear if this is contributing to his current presentation.   Dr. Arturo Chen can evaluate his valve at the time of cath as well.  3. Sick sinus, status post pacemaker. [facility] and Dr. Lauri Ferrer. 4. Tobacco use. Encouraged continued cessation. 5. Essential hypertension. Blood pressure is controlled. 6. Low platelet count. Followed by Dr. Vicenta Solomon. 7. Lower extremity edema, venous insufficiency. He does have 1-2+ bilateral lower extremity edema, left greater than right. Elevate legs, compression stockings, minimize salt intake. Will go ahead and increase his Lasix from 40 to 80 mg. He  has a past medical history of COPD (chronic obstructive pulmonary disease) ([facility] [address] Diabetes ([facility] [address] Enlarged prostate, Moderate aortic stenosis, and Pneumonia. All other systems negative except as above. PE  Vitals:    06/13/22 1321   BP: 136/78   Pulse: 82   Resp: 24   SpO2: 98%   Weight: 203 lb (92.1 kg)   Height: 5' 11\" (1.803 m)    Body mass index is 28.31 kg/m².    General appearance - alert, well appearing, and in no distress  Mental status - affect appropriate to mood  Eyes - sclera anicteric, moist mucous membranes  Neck - supple, no JVD  Chest - clear to auscultation, no wheezes, rales or rhonchi  Heart - normal rate, regular rhythm, normal S1, Y3,IN/NV systolic murmur LUSB  Abdomen - soft, nontender, nondistended, no masses or organomegaly  Extremities - peripheral pulses normal, no pedal edema    Recent Labs:  Lab Results   Component Value Date/Time    Cholesterol, total 150 03/22/2022 12:39 PM    HDL Cholesterol 34 03/22/2022 12:39 PM    LDL, calculated 93.8 03/22/2022 12:39 PM    Triglyceride 111 03/22/2022 12:39 PM    CHOL/HDL Ratio 4.4 03/22/2022 12:39 PM     Lab Results   Component Value Date/Time    Creatinine 1.54 (H) 03/22/2022 12:39 PM     Lab Results   Component Value Date/Time    BUN 33 (H) 03/22/2022 12:39 PM     Lab Results   Component Value Date/Time    Potassium 5.2 (H) 03/22/2022 12:39 PM     Lab Results   Component Value Date/Time    Hemoglobin A1c 5.6 03/22/2022 12:39 PM     Lab Results   Component Value Date/Time    HGB 10.7 (L) 2022 12:39 PM     Lab Results   Component Value Date/Time    PLATELET 54 (L)  12:39 PM       Reviewed:  Past Medical History:   Diagnosis Date    COPD [facility] disease) ([facility] [address]     Diabetes ([facility] [address]     Enlarged prostate     Moderate aortic stenosis     echo 2016 [address]     Pneumonia      Social History     Tobacco Use   Smoking Status Light Tobacco Smoker    Packs/day: 0.50    Types: Cigars    Last attempt to quit: 7/10/2021    Years since quittin.9   Smokeless Tobacco Never Used   Tobacco Comment    6-8 cigars a day     Social History     Substance and Sexual Activity   Alcohol Use Yes    Alcohol/week: 14.0 standard drinks    Types: 14 Cans of beer per week     No Known Allergies    Current Outpatient Medications   Medication Sig    NovoLIN 70/30 U-100 Insulin 100 unit/mL (70-30) injection INJECT 6 UNITS UNDER THE SKIN BEFORE BREAFAST AND DINNER    gabapentin (NEURONTIN) 300 mg capsule TAKE 1 CAPSULE BY MOUTH EVERY NIGHT    nicotine (NICODERM CQ) 21 mg/24 hr APPLY 1 PATCH TO SKIN ONCE EVERY 24 HOURS (Patient taking differently: as needed. On/Off)    tamsulosin (FLOMAX) 0.4 mg capsule TAKE 1 CAPSULE BY MOUTH EVERY DAY    Klor-Con M20 20 mEq tablet TAKE 1 TABLET BY MOUTH EVERY DAY    Insulin Syringe-Needle U-100 (BD Insulin Syringe Ultra-Fine) 0.3 mL 31 gauge x 5/16\" syrg Use twice daily to inject insulin.  furosemide (LASIX) 40 mg tablet Take 1 Tablet by mouth daily.  BD Insulin Syringe Ultra-Fine 0.5 mL 30 gauge x 1/2\" syrg USE AS DIRECTED TWICE A DAY TO INJECT INSULIN    metFORMIN ER (GLUCOPHAGE XR) 500 mg tablet Take 1 Tab by mouth daily (with dinner).  glucose blood VI test strips (LIBRADO BLOOD GLUCOSE TEST STRIP) strip USE TO TEST TWICE DAILY    multivit with minerals/lutein (MULTIVITAMIN 50 PLUS PO) Take  by mouth.     ONETOUCH ULTRA TEST strip USE TO TEST TWICE DAILY    albuterol (PROAIR HFA) 90 mcg/actuation inhaler Take 1 Puff by inhalation. (Patient not taking: Reported on 6/13/2022)    mometasone-formoterol (DULERA) 100-5 mcg/actuation HFA inhaler INL 2 PFS PO BID (Patient not taking: Reported on 6/13/2022)     No current facility-administered medications for this visit.        Eliu Thorpe MD  [facility] heart and Vascular [place]  [address] [facility] [address] [place] [address]

## 2022-07-05 NOTE — TELEPHONE ENCOUNTER
Spoke with Daughter, Juanis Dumont, listed on PHI. Verified patient using two identifiers. States that her father is currently admitted to [facility] for Pulmonary Edema. I asked that she please just have him call once he is discharged and recovered and we would go ahead and get him rescheduled for his left heart cath. Verbalized understanding, no further questions at this time.

## 2022-07-05 NOTE — TELEPHONE ENCOUNTER
Attempted to return call to patient daughter, Willette Bosworth. Left message requesting return phone call.

## 2022-07-05 NOTE — TELEPHONE ENCOUNTER
Patient's daughter is calling because her father is in the hospital and he is scheduled for a Left heart Catheterization on Friday 7/8/22 and his daughter don't think he will be out of the hospital by then. Patient has been hospitalized for pulmonary edema.     [phone]

## 2022-07-07 NOTE — TELEPHONE ENCOUNTER
Outbound call to cherish. Informed that pt needs to have an office visit for RON. Pt will be discharged tomorrow. She stated understanding.

## 2022-07-07 NOTE — TELEPHONE ENCOUNTER
cherish calling from [facility] at home to see if provider will follow patient for home health    cherish [phone]

## 2022-07-07 NOTE — TELEPHONE ENCOUNTER
Will need OV for RON to qualify for face to face visit for  home health . Please let him know and schedule for next visit. thanks

## 2022-07-07 NOTE — TELEPHONE ENCOUNTER
Outbound call to bita. Pt's name and  verified. Pt didn't have a procedure done. He was admitted for chest pain and difficulty breathing. She stated that doctors at [address] [place] [address] don't follow pt's home health after discharge. Pt was advised to follow up with PCP. Pt will be having physical therapy, occupational therapy.

## 2022-07-08 NOTE — TELEPHONE ENCOUNTER
Sylwia called, she wants to now if  will sign OT,PT, and Nursing orders before she sees the Pt, or does she needs to  see the Pt first.

## 2022-07-08 NOTE — TELEPHONE ENCOUNTER
Outbound call to patient. Name and  verified.  Informed that pt will need to be seen first. Pt is scheduled for 22

## 2022-07-08 NOTE — TELEPHONE ENCOUNTER
Sylwia from [address] at home wanted to know if Dr. Shelbi Ramos would follow his home health as a  if anything is off.      [phone]

## 2022-07-12 NOTE — TELEPHONE ENCOUNTER
[facility] is requesting a new prescription on behalf of the pt via fax. Please review. Historical medication: Pyridium 200 mg (07/2021) was discontinued on 07/19/2021 - reason: Therapy Completed.      Last visit 03/31/2022 MD Brian Mccallum   Next appointment 07/14/2022 & 07/21/2022 MD [facility]     Intervention Detail: New Rx: 1, reason: Patient Preference   Time Spent (min): 5        Requested Prescriptions     Pending Prescriptions Disp Refills    phenazopyridine (PYRIDIUM) 200 mg tablet 30 Tablet 0     Sig: TAKE 1 TABLET BY MOUTH 3 TIMES A DAY AS NEEDED FOR URINARY DISCOMFORT

## 2022-07-12 NOTE — TELEPHONE ENCOUNTER
As per records, patient is still in hospital.  Will not do refill as patient did now significant other medical conditions.

## 2022-07-13 NOTE — TELEPHONE ENCOUNTER
LVM for patient stating that he has been disconnected from his Carelink monitor for some time. Have sent multiple letters as well. Advised patient to unplug to reset monitor and if that doesn't work patient needs to call [phone] for assistance.

## 2022-07-14 PROBLEM — I50.23 SYSTOLIC CHF, ACUTE ON CHRONIC (HCC): Status: ACTIVE | Noted: 2022-01-01

## 2022-07-14 PROBLEM — J96.01 ACUTE RESPIRATORY FAILURE WITH HYPOXIA (HCC): Status: ACTIVE | Noted: 2022-01-01

## 2022-07-14 NOTE — ASSESSMENT & PLAN NOTE
uncontrolled, changes made today: Bumetanide dose was increased, medication adherence emphasized, lifestyle modifications recommended strongly recommended to follow-up with cardiology ASAP.   Last ejection fraction 10 to 15% during recent hospitalization

## 2022-07-14 NOTE — ASSESSMENT & PLAN NOTE
uncontrolled, continue current medications, continue current treatment plan, medication adherence emphasized, lifestyle modifications recommended   Strongly recommended to stop smoking and also discussed importance of compliance with medications and inhalers

## 2022-07-14 NOTE — PROGRESS NOTES
Ambulatory Care Management Note    Date/Time:  7/14/2022 4:11 PM    This patient was received as a referral from Provider. Ambulatory Care Manager outreached to patient today to offer care management services. Introduction to self and role of care manager provided. Patient accepted care management services at this time. ACM contact information provided during office encounter. This Ambulatory Care Manager (ACM) reviewed and updated the following screenings during this call; general assessment, disease specific assessment  and self management assessment    Patient's challenges to self-management identified:   functional physical ability, level of motivation, medical condition, medication management, multi health system providers, polypharmacy and support system    Medication Management:  good understanding and poor adherence    Advance Care Planning:   Does patient have an Advance Directive:  ACP referral placed by PCP. Pt mailed ACP docs per note. [employer], Referrals, and Durable Medical Equipment: Pt discharged with H/H. Pt states he has not heard from H/H. ACM placed call to H/H to confirm orders. H/H to contact pt to schedule nursing assessment. Confirmed best contact number for pt. PCP to follow pt. Referral to H/H, PT/OT, Wound Care. Health Maintenance Due   Topic Date Due    Shingrix Vaccine Age 49> (1 of 2) Never done    Foot Exam Q1  07/19/2022     Health Maintenance Reviewed: yes    Top Challenges reviewed with the provider   Pt discharged 7/8/22 from [facility]. EF-10-15% during hospitalization. Pt to f/u with Cards ASAP. Cards aware. Pt noted with significant SOB during o/v. [place] Discharge weight 203lb. weight obtained during RON visit 213lb   BLE Edema 2/3+   H/H orders placed during hospitalization. No contact as of today. H/H to contact pt to schedule initial visit.  Advised need for Nursing/PT/OT/Wound care         Ambulatory  contacted patient for discussion and case management of HTN, COPD, CHF- EF10-15%. Summary of patients top problems:   1. CHF-EF 10-15% noted on discharge paperwork. Pt noted with  SOB. BLE edema 2/3+, wheeping LLE ulcer,  unable to lay flat d/t increasing SOB. Pt reports having to sleep in chair. Increase of 10lb noted from discharge weight 7/8/22 and 7/14/22 weight. Cards of aware of urgent need for appointment. Prior to hospitalization, pt scheduled for Left heart cath&angiogram . canceled d/t hospitalization. 2. Respiratory-Recent hospital admission: Dx with respiratory failure secondary to CHF & COPD exacerbation. Pt c/o increase SOB, PCP noted bilateral crackles. Pt advised to use scheduled and PRN respiratory meds. Pt current smoker. Pt advised to use DME while ambulating. Pt using walker. reports having rollator/cane at home. Instructed pt to sit if experiencing SOB, dizzy/lightheadedness. ACM to identify further pt needs. PCP/Specialist follow up:   Future Appointments   Date Time Provider Cyn Heath   8/18/2022  2:00 PM Rachel Pallas, MD [facility] AMB   10/12/2022  4:30 PM JEREMIE OSBORNE AMB          Goals      Identification of barriers to adherence to a plan of care such as inability to afford medications, lack of insurance, lack of transportation, etc.            Patient/son verbalized understanding of all information discussed. Patient provided with Ambulatory Care Manager's contact information for any further questions, concerns, or needs.

## 2022-07-14 NOTE — PATIENT INSTRUCTIONS
Cardiac Rehabilitation: Care Instructions  Overview     Cardiac rehabilitation is a program for people who have a heart problem, such as a heart attack, heart failure, or a heart valve disease. The program includes exercise, education, and emotional support. Cardiac rehab can help you improve the quality of your life through better overall health. Your cardiac rehab team will include several people, including your doctor, a nurse specialist, a dietitian, and a physical therapist. They will design your cardiac rehab program specifically for you. You will learn how to reduce your risk for heart problems and how to manage stress. You can learn how to stay active, eat healthy, stay at a healthy weight, and manage other health problems. Your rehab team also can help you quit smoking. By the end of the program, you will be ready to maintain a healthy lifestyle on your own. Follow-up care is a key part of your treatment and safety. Be sure to make and go to all appointments, and call your doctor if you are having problems. It's also a good idea to know your test results and keep a list of the medicines you take. How can you care for yourself at home? · Take your medicines exactly as prescribed. Call your doctor if you think you are having a problem with your medicine. You will get more details on the specific medicines your doctor prescribes. · Weigh yourself every day if your doctor tells you to. Watch for sudden weight gain. Weigh yourself on the same scale with the same amount of clothing at the same time of day. · Eat heart-healthy foods. These foods include vegetables, fruits, nuts, beans, lean meat, fish, and whole grains. Limit things that are not so good for your heart, like sodium, alcohol, and sugar. · Learn how to take your pulse so that you can track your heart rate during exercise.   · Always check with your rehab team or your doctor before you begin a new exercise program.  · Follow instructions from your rehab team about exercising at home. The rehab team can help make a program for you. · Stop exercising if you have any unusual discomfort, such as chest pain. · Do not smoke. Smoking can make heart problems worse. If you need help quitting, talk to your doctor or rehab team about stop-smoking programs and medicines. These can increase your chances of quitting for good. When should you call for help? Call 911 anytime you think you may need emergency care. For example, call if:    · You have severe trouble breathing.     · You cough up pink, foamy mucus and you have trouble breathing.     · You have symptoms of a heart attack. These may include:  ? Chest pain or pressure, or a strange feeling in the chest.  ? Sweating. ? Shortness of breath. ? Nausea or vomiting. ? Pain, pressure, or a strange feeling in the back, neck, jaw, or upper belly or in one or both shoulders or arms. ? Lightheadedness or sudden weakness. ? A fast or irregular heartbeat. After you call 911, the  may tell you to chew 1 adult-strength or 2 to 4 low-dose aspirin. Wait for an ambulance. Do not try to drive yourself.     · You have angina symptoms (such as chest pain or pressure) that do not go away with rest or are not getting better within 5 minutes after you take a dose of nitroglycerin.     · You have symptoms of a stroke. These may include:  ? Sudden numbness, tingling, weakness, or loss of movement in your face, arm, or leg, especially on only one side of your body. ? Sudden vision changes. ? Sudden trouble speaking. ? Sudden confusion or trouble understanding simple statements. ? Sudden problems with walking or balance. ? A sudden, severe headache that is different from past headaches.     · You passed out (lost consciousness).    Call your doctor now or seek immediate medical care if:    · You have new or increased shortness of breath.     · You are dizzy or lightheaded, or you feel like you may faint.     · You gain weight suddenly, such as more than 2 to 3 pounds in a day or 5 pounds in a week. (Your doctor may suggest a different range of weight gain.)     · You have increased swelling in your legs, ankles, or feet. Watch closely for changes in your health, and be sure to contact your doctor if you have any problems. Where can you learn more? Go to http://www.gray.com/  Enter D709 in the search box to learn more about \"Cardiac Rehabilitation: Care Instructions. \"  Current as of: January 10, 2022               Content Version: 13.2  © 1569-5105 [employer]. Care instructions adapted under license by "[employer]" (which disclaims liability or warranty for this information). If you have questions about a medical condition or this instruction, always ask your healthcare professional. [address] any warranty or liability for your use of this information.

## 2022-07-14 NOTE — PROGRESS NOTES
HISTORY OF PRESENT ILLNESS  Miguel Ángel Blankenship is a 81 y.o. male. He was seen today for follow-up on recent hospital discharge after acute heart failure and acute respiratory failure. He was accompanied by his son. Date of admission 07/02/2022, he presented to ED on 07/01/2022  Date of discharge 07/08/2022. [facility] is seen for follow up from recent admission to [facility] on 7/2/22. .  We reviewed the active problem list, medication list, allergies, family history, notes from last encounter, lab results, imaging, hospitalist admission and discharge summaries cardiology summaries, echocardiogram, discharge medications. He presented with severe shortness of breath. He arrived to emergency department by EMS and according to EMS patient was having labored respirations, was tachypneic, diaphoretic and was desaturating below 90% in room air. .  In the emergency department he was given DuoNeb, IV Solu-Medrol and was placed on BiPAP. He was also given 40 mg of IV Lasix. His EKG initially showed good ventricular paced rhythm with tachycardia. Cardiology was consulted as he started desaturating on BiPAP. During his initial ED arrival he was in acute kidney injury with his creatinine bumped to 1.56 and his BNP was 12,885. His lactic acid was elevated to 2.7. X-ray chest showed pulmonary vascular congestion with small pleural effusions and bibasilar atelectasis. He was admitted to hospital with diagnosis of acute hypoxemic respiratory failure secondary to acute CHF exacerbation as well as COPD exacerbation. He was also started on IV ceftriaxone and Doxy. Patient had echocardiogram done on next day that showed severely reduced systolic function with ejection fraction of 10 to 15% along with severe hypokinesia of the anterolateral myocardium.   Anterior septum and apex were nearly akinetic aortic valve severely thickened with severely stenosis with a peak gradient of 56 mmHg and valve area 0.7 cm². On second day. His medications were changed to bumetanide and he was started on dobutamine and drip along with IV prednisone. He showed improvement with IV diuresis and IV dobutamine. He was slowly weaned off BiPAP and changed to p.o. medications. His sugar stayed very high so he was managed on insulin. His metformin was on hold due to BALDEMAR. He was discharged on metoprolol 12.5 mg daily, spironolactone 25 mg, bumetanide 1 mg as well as tapered dose of prednisone. His Lasix and potassium were discontinued and was continued on his routine home medications including insulin, gabapentin, tamsulosin, phenazopyridine. He is taking his medications as directed & without any side effects. He reports symptoms are not changed. He still having significant shortness of breath it is worst on lying down position. As per records review, his discharge weight was 203 pound while he is weighing 213 pounds today in office. He is complaining of worsening shortness of breath, not able to sleep with 1 pillow, having panic attacks at the nighttime. He was scheduled for coronary angiogram and cardiac catheterization to prepare him for surgery for TVAR. His cath was scheduled for 07/08/2022 that was canceled due to his admission in another hospital.  His follow-up appointment with cardiology were also canceled. Review of Systems   Constitutional: Negative for chills, fever and malaise/fatigue. HENT: Negative for congestion, ear pain, sore throat and tinnitus. Eyes: Negative for blurred vision, double vision, pain and discharge. Respiratory: Positive for shortness of breath. Negative for cough and wheezing. Cardiovascular: Negative for chest pain, palpitations and leg swelling. Gastrointestinal: Negative for abdominal pain, blood in stool, constipation, diarrhea, nausea and vomiting. Genitourinary: Negative for dysuria, frequency, hematuria and urgency.         Itching on the penis Musculoskeletal: Negative for back pain, joint pain and myalgias. Skin: Negative for rash. Small ulcer with oozing of clear fluid on the skin of left lower leg   Neurological: Negative for dizziness, tremors, seizures and headaches. Endo/Heme/Allergies: Negative for polydipsia. Does not bruise/bleed easily. Psychiatric/Behavioral: Negative for depression and substance abuse. The patient is not nervous/anxious. Physical Exam  Vitals and nursing note reviewed. Constitutional:       Appearance: He is well-developed. He is not diaphoretic. HENT:      Head: Normocephalic and atraumatic. Right Ear: External ear normal.      Mouth/Throat:      Pharynx: No oropharyngeal exudate. Eyes:      General: No scleral icterus. Conjunctiva/sclera: Conjunctivae normal.      Pupils: Pupils are equal, round, and reactive to light. Neck:      Thyroid: No thyromegaly. Vascular: No JVD. Cardiovascular:      Rate and Rhythm: Normal rate and regular rhythm. Heart sounds: Normal heart sounds. No murmur heard. Pulmonary:      Effort: Pulmonary effort is normal.      Breath sounds: Rales present. No wheezing. Comments: Significant bilateral crackles  Abdominal:      General: Bowel sounds are normal. There is no distension. Palpations: Abdomen is soft. There is no mass. Musculoskeletal:         General: No tenderness. Normal range of motion. Cervical back: Normal range of motion and neck supple. Right lower le+ Edema present. Left lower le+ Edema present. Lymphadenopathy:      Cervical: No cervical adenopathy. Skin:     General: Skin is warm and dry. Findings: No rash. Neurological:      Mental Status: He is alert and oriented to person, place, and time. Cranial Nerves: No cranial nerve deficit. Deep Tendon Reflexes: Reflexes are normal and symmetric.  Reflexes normal.         ASSESSMENT and PLAN  Diagnoses and all orders for this visit: 1. Systolic CHF, acute on chronic [facility])  Assessment & Plan:   uncontrolled, changes made today: Bumetanide dose was increased, medication adherence emphasized, lifestyle modifications recommended strongly recommended to follow-up with cardiology ASAP. Last ejection fraction 10 to 15% during recent hospitalization    Orders:  -     bumetanide (BUMEX) 1 mg tablet; Take 1 Tablet by mouth two (2) times a day. 2. Acute respiratory failure with hypoxia [facility])  Assessment & Plan:   Improving. Medication reconciliation done. Strongly recommended to use inhalers as well as emergency nebulized treatment as scheduled and to continue with steroid as prescribed. Orders:  -     albuterol (PROVENTIL VENTOLIN) 2.5 mg /3 mL (0.083 %) nebu; 3 mL by Nebulization route every four (4) hours as needed for Wheezing. 3. Infected ulcer of skin, limited to breakdown of skin [facility])  Assessment & Plan:   borderline controlled, continue current medications, continue current treatment plan, medication adherence emphasized   Dressing was done in office and home health referral done for wound care      4. Hospital discharge follow-up    5. Edema, unspecified type    6. Candidal dermatitis  -     nystatin-triamcinolone (MYCOLOG) 100,000-0.1 unit/gram-% ointment; Apply  to affected area two (2) times a day. On tip of penis    7. Pulmonary emphysema, unspecified emphysema type ([facility] [address]  Assessment & Plan:   uncontrolled, continue current medications, continue current treatment plan, medication adherence emphasized, lifestyle modifications recommended   Strongly recommended to stop smoking and also discussed importance of compliance with medications and inhalers    Orders:  -     mometasone-formoterol (Dulera) 100-5 mcg/actuation HFA inhaler; INL 2 PFS PO BID    Spent 50 minutes with patient today to review all the records that were received from [facility] and those were in paper documents form. .  Also discussed importance of home health for physical therapy, Occupational Therapy, wound care and strongly recommended to follow-up with cardiology for earliest possible cardiac catheterization due to worsening systolic function and to refer him for heart failure clinic. Also, discussed importance of compliance with medications. Discussed his care with Ms. Anum Rodriguez, advanced care management nurse for possible evaluation for home aide, . Discussed lifestyle issues and health guidance given  Patient was given an after visit summary which includes diagnoses, vital signs, current medications, instructions and references & authorized prescriptions . Results of labs will be conveyed to patient, once available. Pt verbalized instructions I provided and expressed understanding of discussion that was held today. Please note that this dictation was completed with MeeDoc, the computer voice recognition software. Quite often unanticipated grammatical, syntax, homophones, and other interpretive errors are inadvertently transcribed by the computer software. Please disregard these errors. Please excuse any errors that have escaped final proofreading. Thank you.

## 2022-07-14 NOTE — ASSESSMENT & PLAN NOTE
Improving. Medication reconciliation done. Strongly recommended to use inhalers as well as emergency nebulized treatment as scheduled and to continue with steroid as prescribed.

## 2022-07-14 NOTE — ASSESSMENT & PLAN NOTE
borderline controlled, continue current medications, continue current treatment plan, medication adherence emphasized   Dressing was done in office and home health referral done for wound care

## 2022-07-14 NOTE — PROGRESS NOTES
Chief Complaint   Patient presents with    Transitions Of Care     follow up         1. \"Have you been to the ER, urgent care clinic since your last visit? Hospitalized since your last visit? \" Yes When: friday Where: [facility] Reason for visit: heart attack    2. \"Have you seen or consulted any other health care providers outside of the [address] [place] [address] since your last visit? \" No     3. For patients aged 39-70: Has the patient had a colonoscopy / FIT/ Cologuard? NA - based on age      If the patient is female:    4. For patients aged 41-77: Has the patient had a mammogram within the past 2 years? NA - based on age or sex      11. For patients aged 21-65: Has the patient had a pap smear?  NA - based on age or sex         3 most recent PHQ Screens 7/14/2022   PHQ Not Done -   Little interest or pleasure in doing things Not at all   Feeling down, depressed, irritable, or hopeless Not at all   Total Score PHQ 2 0       Health Maintenance Due   Topic Date Due    Shingrix Vaccine Age 49> (1 of 2) Never done    Foot Exam Q1  07/19/2022

## 2022-07-15 NOTE — PROGRESS NOTES
Goals Addressed                 This Visit's Progress     Attends follow-up appointments as directed. 07/15/22  Pt seen in office for RON. Pt discharged from [facility] 7/8/22. Discharge summary obtained. Per PCP pt to follow up with Cardiology ASAP. ACM provided appointment details to patient. Pt scheduled with cardiology 7/18/22 & 7/20/22.  Knowledge and adherence of prescribed medication (ie. action, side effects, missed dose, etc.).        07/15/22  Pt seen in office for RON. Discharge Dx:respiratory failure secondary to CHF & COPD exacerbation. Pt c/o increase SOB, PCP noted bilateral crackles. Pt noted with significant SOB during o/v. 10lbs weight increase over 6 days. BLE Edema 2/3+. Discussed importance of medication adherence. Pt unsure of current medications. Reports taking \"whatever meds they told me to take\". ACM reviewed red flags with pt. ACM to attempt medication reconciliation at next outreach 5-7 days. PM                 Pt \"breathing better today\" notes increase SOB with activity. States he has to sit down a lot more now, isnt able to do a lot right now. Encouraged pt to rest, minimize activity. Pt reports spouse has personal aide in home 2/3 hours in the morning, 2 hours in evening. Prior to recent cardiac event, pt primary care giver to disabled spouse. States son is \"helping as much as he can\". Daughter lives in [place]. Pt declines additional support at this time. H/H: nursing scheduled to see pt today or tomorrow.

## 2022-07-15 NOTE — PROGRESS NOTES
[facility] [place]: [place]  [phone]    History of Present Illness:   Mr. Swapna Porter is 70 yo M with mild to moderate aortic stenosis, RBBB, COPD, HTN. Echo 2018 with normal LV function with moderate aortic stenosis, mean gradient of 24 mmHg. This is slightly increased from 17 mmHg in 2016. H/o bladder cancer and follows Dr Sapphire Gorman s/p MAURICE. Followed by hematology for chronic thrombocytopenia. He is here for preop cardiac evaluation prior to hip surgery with Dr. Christian Cohen at [address] [place] [address]. From a symptom standpoint, he denies any chest pain and his breathing has been stable. No significant palpitations, lightheadedness or dizziness. He is also being checked by hematology given his anemia and thrombocytopenia. He is compensated on exam with clear lungs. He does have a II-III/VI systolic murmur at the left upper sternal border overall unchanged. His echocardiogram last year demonstrated moderate aortic stenosis with a mean gradient of 24 mmHg. He is working on cutting back on tobacco use. Assessment and Plan:    1. Preop cardiac evaluation. He is stable cardiac wise and low risk for cardiac complications. No additional cardiac evaluation is indicated prior to surgery and will send a clearance report to his primary care physician and to his surgeon, Dr. Sandra Oliver. 2. Moderate aortic stenosis. He is asymptomatic with this and will proceed with his plan for repeat echocardiogram and follow up in December. The echo does not need to be done prior to his surgery. His physical exam today is still consistent with aortic stenosis in the moderate range. 3. Essential hypertension. Blood pressure is controlled. 4. Tobacco use. Encouraged cessation. 5. COPD. He  has a past medical history of COPD (chronic obstructive pulmonary disease) ([address] Diabetes ([facility] [address] Enlarged prostate, Moderate aortic stenosis, and Pneumonia. All other systems negative except as above.      PE  Vitals:    07/08/19 1515 RN returned triage call.  Unable to reach patient.  Left message to return call.   BP: 110/62   Pulse: 79   Resp: 16   SpO2: 98%   Weight: 194 lb (88 kg)   Height: 5' 11\" (1.803 m)    Body mass index is 27.06 kg/m².    General appearance - alert, well appearing, and in no distress  Mental status - affect appropriate to mood  Eyes - sclera anicteric, moist mucous membranes  Neck - supple, no JVD  Chest - clear to auscultation, no wheezes, rales or rhonchi  Heart - normal rate, regular rhythm, normal S1, S2, II-III/IV systolic murmur LUSB  Abdomen - soft, nontender, nondistended, no masses or organomegaly  Extremities - peripheral pulses normal, no pedal edema    Recent Labs:  Lab Results   Component Value Date/Time    Cholesterol, total 153 06/24/2019 10:33 AM    HDL Cholesterol 37 (L) 06/24/2019 10:33 AM    LDL, calculated 96 06/24/2019 10:33 AM    Triglyceride 99 06/24/2019 10:33 AM     Lab Results   Component Value Date/Time    Creatinine 1.08 06/24/2019 10:33 AM     Lab Results   Component Value Date/Time    BUN 22 06/24/2019 10:33 AM     Lab Results   Component Value Date/Time    Potassium 4.7 06/24/2019 10:33 AM     Lab Results   Component Value Date/Time    Hemoglobin A1c 5.3 06/24/2019 10:33 AM     Lab Results   Component Value Date/Time    HGB 11.7 (L) 06/24/2019 10:33 AM     Lab Results   Component Value Date/Time    PLATELET 70 (LL) 56/36/4636 10:33 AM       Reviewed:  Past Medical History:   Diagnosis Date    COPD (chronic [facility] disease) ([facility] [address]     Diabetes ([facility] [address]     Enlarged prostate     Moderate aortic stenosis     echo June 2016 Terrilee Pac     Pneumonia      Social History     Tobacco Use   Smoking Status Current Every Day Smoker    Packs/day: 0.50   Smokeless Tobacco Never Used   Tobacco Comment    1 pack a day  ( 16-18 ) a day      Social History     Substance and Sexual Activity   Alcohol Use Yes    Alcohol/week: 8.4 oz    Types: 14 Cans of beer per week    Comment: Social      No Known Allergies    Current Outpatient Medications   Medication Sig    glucose blood VI test strips (LIBRADO BLOOD GLUCOSE TEST STRIP) strip USE TO TEST TWICE DAILY    multivit with minerals/lutein (MULTIVITAMIN 50 PLUS PO) Take  by mouth.  albuterol (PROAIR HFA) 90 mcg/actuation inhaler Take 1 Puff by inhalation.  Blood-Glucose Meter (ONETOUCH ULTRA2) monitoring kit     mometasone-formoterol (DULERA) 100-5 mcg/actuation HFA inhaler INL 2 PFS PO BID    gabapentin (NEURONTIN) 300 mg capsule TAKE 1 CAPSULE BY MOUTH EVERY NIGHT    tamsulosin (FLOMAX) 0.4 mg capsule TAKE 1 CAPSULE BY MOUTH DAILY    furosemide (LASIX) 40 mg tablet TAKE 1 TABLET BY MOUTH DAILY    potassium chloride (K-DUR, KLOR-CON) 20 mEq tablet TAKE 1 TABLET BY MOUTH EVERY DAY    insulin NPH/insulin regular (HUMULIN 70/30 U-100 INSULIN) 100 unit/mL (70-30) injection INJECT 10 UNITS UNDER THE SKIN BEFORE BREAKFAST AND DINNER. DISCARD AFTER 28 DAYS FROM FIRST USE (Patient taking differently: 8 Units Daily (before breakfast). INJECT 10 UNITS UNDER THE SKIN BEFORE BREAKFAST AND DINNER. DISCARD AFTER 28 DAYS FROM FIRST USE)    metFORMIN ER (GLUCOPHAGE XR) 500 mg tablet TAKE 1 TABLET BY MOUTH DAILY WITH DINNER    mometasone-formoterol (DULERA) 100-5 mcg/actuation HFA inhaler Take 2 Puffs by inhalation two (2) times a day.  Insulin Syringe-Needle U-100 (BD LO-DOSE MICRO-FINE IV) 1/2 mL 28 gauge x 1/2\" syrg Use to inject Insulin 10 units twice daily    ONETOUCH ULTRA TEST strip USE TO TEST TWICE DAILY    VESICARE 10 mg tablet TK 1 T PO QD     No current facility-administered medications for this visit.         Young Pelletier MD  [facility] heart and [facility] [place]  [address] [facility] [address]  [address]

## 2022-07-15 NOTE — TELEPHONE ENCOUNTER
I spoke with Jason Rosado who is handling the patient's case. She stated that she is worried about the state of the patient. She spoke with him this morning and he stated that he was ok but Sal Monreal called with an appointment about an hour ago and the caregiver stated that she did not know how he was going to get to the appoint ment next week. He is having a hard time breathing. Prakash Ulloa is worried and asked if we have an appointment available sooner.   I gave her an appointment for Monday with Cecilio Berg at 10:40 and she will speak with  the patient and cancel if he goes to the ED

## 2022-07-19 NOTE — TELEPHONE ENCOUNTER
Pernell Ko one of the caregiver called stated patient is not taking medication and not eating. Blood pressure 112/72 wants to know if provider want her still give patient blood pressure medication.     Requesting a call back    Best call back #[phone]

## 2022-07-19 NOTE — TELEPHONE ENCOUNTER
Please let caregiver know that metoprolol he is on is not actually blood pressure medicine, that is medication for his acute heart failure and to prevent any major cardiac arrhythmias. He needs to take that metoprolol and he is actually taking half tablet twice daily, should not lower his blood pressure.   Thanks

## 2022-07-19 NOTE — TELEPHONE ENCOUNTER
Kacy Ring contacted me per TEAMS and let me know that pt had audible wheezing and fatigue and head laying down on table, she was begging him to go to the ED but he refused. I let her know that I will call and talk with patient and encourage him to go to ED. I called pt and spoke with his wife and she confirmed that he is not doing well but he refuses to go to ED. I asked to speak with him and let him know that  We recommend that he goes to the ED. He said \" no and you are all idiots\" I let him know that if he comes in tomorrow that the state that he is in he will be sent to the ED. He grumbled. .. I let him know that we are just trying to help him and the longer he waits the more damage that it is doing. He finally agreed as long as there were no sirens when the EMS came.   I called 911 and gave all information needed and let Kacy Ring know the status and cancelled appointment for tomorrow with Dr Andrew Dennis

## 2022-07-19 NOTE — PROGRESS NOTES
Per pt spouse, pt unable to take call, report pt head on table, \"everything hurts\"  Spouse states pt \"appears to be having a harder time breathing\" Spouse put pt on phone, audible wheezing noted, pt states breathing has worsened, \"feels\" as though he is holding onto more fluid. Reports taking diuretic, however unable to confirm meds during outreach. Discussed need for further evaluation. Pt again declined. \"states they arent [facility] do anything for me there\"  Advised pt to be evaluated in ER. Pt declined. Pt states \"he would think about it if I get worse\" requested pt call EMS. Pt not to drive self. Pt verbalized understanding. See separate telephone encounter 7/19/22 regarding pt condition. Per caregiver, pt not taking medication, not eating. Cardiology: pt status update provided to [facility] . Cassidy Cortes to attempt pt outreach. Per Cassidy Cortes, pt agreeable to being evaluated in ER. Goals Addressed                 This Visit's Progress     Knowledge and adherence of prescribed medication (ie. action, side effects, missed dose, etc.). Not on track     07/15/22  Pt seen in office for RON. Discharge Dx:respiratory failure secondary to CHF & COPD exacerbation. Pt c/o increase SOB, PCP noted bilateral crackles. Pt noted with significant SOB during o/v. 10lbs weight increase over 6 days. BLE Edema 2/3+. Discussed importance of medication adherence. Pt unsure of current medications. Reports taking \"whatever meds they told me to take\". ACM reviewed red flags with pt. ACM to attempt medication reconciliation at next outreach 5-7 days. PM   07/19/22  Received call from pt caregiver regarding pt condition. ACM placed call to pt. Pt reports \"harder time breathing\" audible wheezing noted. Advised pt to seek further evaluation. Pt declined. Cardiology updated. Pt agreeable to further evaluation. ACM to complete chart review.  PM

## 2022-07-19 NOTE — TELEPHONE ENCOUNTER
Outbound call to patient. Name and  verified. Informed pt's caregiver that that metoprolol he is on is not actually blood pressure medicine, that is medication for his acute heart failure and to prevent any major cardiac arrhythmias. He needs to take that metoprolol and he is actually taking half tablet twice daily, should not lower his blood pressure as per Johnathon's note. She stated that pt doesn't take his sugar reading takes insulin regardless.  He refuses to test his blood sugar    He doesn't take any of his inhalers just wants the provider to know

## 2022-07-20 NOTE — TELEPHONE ENCOUNTER
Pts care giver wanted to let care team know (he has remote office visit) that pt is at [facility] rn so a reading wont go through.      [phone]    Future Appointments   Date Time Provider Cyn Heath   7/20/2022 10:45 AM Zohreh PATEL BS AMB   8/18/2022  2:00 PM MD JENNY Arnold BS AMB   10/12/2022  4:30 PM REMOTE1, JEREMIE MAIN AMB

## 2022-07-20 NOTE — LETTER
8/2/2022 8:12 AM    Mr. Etta Feldman  [address] [place] [address]    Dear Mr. Etta Feldman,    We have received your recent remote monitor check of your implanted device on 7/19/22. Your remaining estimated battery life is 10.9 years and your device is working normally & appropriately. Your next remote monitor check is scheduled for 10/26/2022. This is NOT an in-clinic appointment. This transmission is sent from your home monitor. Please make sure your home monitor is plugged into power and within 10 feet of where you sleep. If you are using the phone applications, please make sure it is open on your smart phone. If you have difficulty sending a transmission, please do NOT call our office. Instead, call tech support for your device as they are better able to assist.    [facility] ([employer])   [phone]    If you have any questions, please call the Pacemaker/ICD clinic that follows you. We appreciate you staying remotely connected!     Sincerely,    Dannette Hodgkins RN, BSN  Device Coordinator RN  [facility]  [phone]  HELEN GARNETT  [phone]

## 2022-07-22 NOTE — TELEPHONE ENCOUNTER
Patient's daughter is calling because her father is back in the hospital.Patient has fluid on his lungs. Patient is in [address] daughter said that her father would like to have surgery schedule. Please call back to assist.    [address]

## 2022-07-22 NOTE — TELEPHONE ENCOUNTER
Spoke with pt daughter and made her aware that pt was scheduled for LHC and let her know that she should make Chip aware, she stated thanks and said she will

## 2022-07-22 NOTE — TELEPHONE ENCOUNTER
I spoke with pt's daughter, verified 2 pt identiifers, pt daughter stated that he is still in [facility], he is on 5 l of O2 and diuretics and steroids. \"He is not doing well\" is what his daughter stated. She asked what his fastest route to getting his \"heart procedure\" was. I let her know that we have him scheduled to see Dr Macey Gomez on July 27 at 2:20pm after that appointment we can schedule him for the procedure. She asked if cande can do this procedure and I let her know that they consulted him about it and he refused wanting to have dr Oscar Pepe do it but that he would defiantly get it done faster while he is in-patient. She stated thanks. While I was documenting it clicked that he was going to have a LHC done with our office and also evaluate the aorta when they do the cath. I called the daughter and left a message asking for a return call for the information.

## 2022-08-01 NOTE — TELEPHONE ENCOUNTER
- thoracentesis today. bilateral PE's. 1500ml removed  -7/29 likely for pneumonia--started on vanc/zosyn  -cards note from today-mentions hes breathing better today.     Update from Nurse navigator

## 2022-08-02 NOTE — PROGRESS NOTES
Chargeable dc pm remote    Normal device function with noted FFRW oversensing - will adjust next clinic visit. 100% RV pacing. See scanned report in  for details.

## 2022-08-18 NOTE — PROGRESS NOTES
Patient has graduated from the Complex Case Management  program on 08/18/22    Pt currently admitted under hospice level of care at [facility] [address]                   This Visit's Progress     COMPLETED: Attends follow-up appointments as directed. 07/15/22  Pt seen in office for RON. Pt discharged from [facility] 7/8/22. Discharge summary obtained. Per PCP pt to follow up with Cardiology ASAP. ACM provided appointment details to patient. Pt scheduled with cardiology 7/18/22 & 7/20/22. COMPLETED: Knowledge and adherence of prescribed medication (ie. action, side effects, missed dose, etc.).        07/15/22  Pt seen in office for RON. Discharge Dx:respiratory failure secondary to CHF & COPD exacerbation. Pt c/o increase SOB, PCP noted bilateral crackles. Pt noted with significant SOB during o/v. 10lbs weight increase over 6 days. BLE Edema 2/3+. Discussed importance of medication adherence. Pt unsure of current medications. Reports taking \"whatever meds they told me to take\". ACM reviewed red flags with pt. ACM to attempt medication reconciliation at next outreach 5-7 days. PM   07/19/22  Received call from pt caregiver regarding pt condition. ACM placed call to pt. Pt reports \"harder time breathing\" audible wheezing noted. Advised pt to seek further evaluation. Pt declined. Cardiology updated. Pt agreeable to further evaluation. ACM to complete chart review.  PM                 Future Appointments   Date Time Provider Cyn Heath   3/20/2023  8:15 AM REMOTE1, [facility]

## 2022-08-21 NOTE — PROGRESS NOTES
Pt. Released in [facility] for CRTP device follow-up to [facility] . Currently in hospice. Made inactive in [address] [facility] [address].

## (undated) DEVICE — DRESSING ANTIMIC FOAM OPTIFOAM POSTOP ADH 4 X 6 IN

## (undated) DEVICE — SUTURE V-LOC 180 SZ 2-0 L9IN ABSRB VLT GS-21 L37MM 1/2 CIR VLOCM0345

## (undated) DEVICE — PACEMAKER SETUP: Brand: MEDLINE INDUSTRIES, INC.

## (undated) DEVICE — 3M™ IOBAN™ 2 ANTIMICROBIAL INCISE DRAPE 6650EZ: Brand: IOBAN™ 2

## (undated) DEVICE — HANDLE LT SNAP ON ULT DURABLE LENS FOR TRUMPF ALC DISPOSABLE

## (undated) DEVICE — INTRO PEELWY HEMVLV 7F 13CM -- SHRT PRELUDE SNAP

## (undated) DEVICE — SUTURE DEV SZ 3-0 V-LOC 90 L12IN TO L18IN CV-23 VLT VLOCM0844

## (undated) DEVICE — SUTURE ETHBND EXCEL SZ 2 L30IN NONABSORBABLE GRN L40MM V-37 MX69G

## (undated) DEVICE — AGENT HEMSTAT 3GM PURIFIED PLNT STARCH PWD ABSRB ARISTA AH

## (undated) DEVICE — INTRO SHTH HEMO 9FR 18G 13CM -- PRELUDE SNAP PEEL-AWAY

## (undated) DEVICE — ELECTRODE,RADIOTRANSLUCENT,FOAM,5PK: Brand: MEDLINE